# Patient Record
Sex: MALE | Race: WHITE | NOT HISPANIC OR LATINO | Employment: OTHER | URBAN - METROPOLITAN AREA
[De-identification: names, ages, dates, MRNs, and addresses within clinical notes are randomized per-mention and may not be internally consistent; named-entity substitution may affect disease eponyms.]

---

## 2023-09-19 ENCOUNTER — OFFICE VISIT (OUTPATIENT)
Dept: PRIMARY CARE | Facility: CLINIC | Age: 68
End: 2023-09-19

## 2023-09-19 VITALS
DIASTOLIC BLOOD PRESSURE: 86 MMHG | HEIGHT: 71 IN | SYSTOLIC BLOOD PRESSURE: 136 MMHG | HEART RATE: 97 BPM | BODY MASS INDEX: 36.75 KG/M2 | OXYGEN SATURATION: 95 % | WEIGHT: 262.5 LBS

## 2023-09-19 DIAGNOSIS — Z11.59 NEED FOR HEPATITIS C SCREENING TEST: ICD-10-CM

## 2023-09-19 DIAGNOSIS — I25.10 CORONARY ARTERY DISEASE INVOLVING NATIVE CORONARY ARTERY OF NATIVE HEART WITHOUT ANGINA PECTORIS: ICD-10-CM

## 2023-09-19 DIAGNOSIS — R05.1 ACUTE COUGH: ICD-10-CM

## 2023-09-19 DIAGNOSIS — E78.5 DYSLIPIDEMIA: ICD-10-CM

## 2023-09-19 DIAGNOSIS — R97.20 ELEVATED PSA: ICD-10-CM

## 2023-09-19 DIAGNOSIS — R73.01 ELEVATED FASTING BLOOD SUGAR: Primary | ICD-10-CM

## 2023-09-19 PROBLEM — D22.60 MELANOCYTIC NEVI OF UNSPECIFIED UPPER LIMB, INCLUDING SHOULDER: Status: ACTIVE | Noted: 2021-12-06

## 2023-09-19 PROBLEM — D22.4 MELANOCYTIC NEVI OF SCALP AND NECK: Status: ACTIVE | Noted: 2021-12-06

## 2023-09-19 PROBLEM — R53.83 OTHER FATIGUE: Status: RESOLVED | Noted: 2018-11-12 | Resolved: 2023-09-19

## 2023-09-19 PROBLEM — D18.01 HEMANGIOMA OF SKIN AND SUBCUTANEOUS TISSUE: Status: ACTIVE | Noted: 2021-12-06

## 2023-09-19 PROBLEM — M79.671 FOOT PAIN, BILATERAL: Status: RESOLVED | Noted: 2018-11-12 | Resolved: 2023-09-19

## 2023-09-19 PROBLEM — L23.7 ALLERGIC CONTACT DERMATITIS DUE TO PLANTS, EXCEPT FOOD: Status: ACTIVE | Noted: 2021-12-06

## 2023-09-19 PROBLEM — L82.1 OTHER SEBORRHEIC KERATOSIS: Status: ACTIVE | Noted: 2021-12-06

## 2023-09-19 PROBLEM — M79.672 FOOT PAIN, BILATERAL: Status: RESOLVED | Noted: 2018-11-12 | Resolved: 2023-09-19

## 2023-09-19 PROBLEM — D22.39 MELANOCYTIC NEVI OF OTHER PARTS OF FACE: Status: ACTIVE | Noted: 2021-12-06

## 2023-09-19 PROBLEM — L90.5 SCAR CONDITION AND FIBROSIS OF SKIN: Status: ACTIVE | Noted: 2021-12-06

## 2023-09-19 PROBLEM — C44.319 BASAL CELL CARCINOMA OF SKIN OF OTHER PARTS OF FACE: Status: ACTIVE | Noted: 2021-12-06

## 2023-09-19 PROBLEM — D48.5 NEOPLASM OF UNCERTAIN BEHAVIOR OF SKIN: Status: ACTIVE | Noted: 2021-12-06

## 2023-09-19 PROBLEM — L57.0 ACTINIC KERATOSIS: Status: ACTIVE | Noted: 2021-12-06

## 2023-09-19 PROBLEM — Z85.828 PERSONAL HISTORY OF OTHER MALIGNANT NEOPLASM OF SKIN: Status: ACTIVE | Noted: 2021-12-06

## 2023-09-19 PROBLEM — L57.9 SKIN CHANGES DUE TO CHRONIC EXPOSURE TO NONIONIZING RADIATION, UNSPECIFIED: Status: ACTIVE | Noted: 2021-12-06

## 2023-09-19 PROBLEM — D22.70 MELANOCYTIC NEVI OF UNSPECIFIED LOWER LIMB, INCLUDING HIP: Status: ACTIVE | Noted: 2021-12-06

## 2023-09-19 PROBLEM — L02.91 ABSCESS: Status: RESOLVED | Noted: 2018-11-12 | Resolved: 2023-09-19

## 2023-09-19 PROBLEM — D22.5 MELANOCYTIC NEVI OF TRUNK: Status: ACTIVE | Noted: 2021-12-06

## 2023-09-19 LAB
POC ALBUMIN /CREATININE RATIO MANUALLY ENTERED: ABNORMAL UG/MG CREAT
POC HEMOGLOBIN A1C: 8 % (ref 4.2–6.5)
POC URINE ALBUMIN: 150 MG/L
POC URINE CREATININE: 200 MG/DL

## 2023-09-19 PROCEDURE — 82044 UR ALBUMIN SEMIQUANTITATIVE: CPT | Performed by: FAMILY MEDICINE

## 2023-09-19 PROCEDURE — 1036F TOBACCO NON-USER: CPT | Performed by: FAMILY MEDICINE

## 2023-09-19 PROCEDURE — 1159F MED LIST DOCD IN RCRD: CPT | Performed by: FAMILY MEDICINE

## 2023-09-19 PROCEDURE — 99203 OFFICE O/P NEW LOW 30 MIN: CPT | Performed by: FAMILY MEDICINE

## 2023-09-19 PROCEDURE — 83036 HEMOGLOBIN GLYCOSYLATED A1C: CPT | Performed by: FAMILY MEDICINE

## 2023-09-19 RX ORDER — AZITHROMYCIN 250 MG/1
TABLET, FILM COATED ORAL
Qty: 6 TABLET | Refills: 0 | Status: SHIPPED | OUTPATIENT
Start: 2023-09-19 | End: 2023-09-24

## 2023-09-19 RX ORDER — CLINDAMYCIN PHOSPHATE 10 UG/ML
LOTION TOPICAL
COMMUNITY
Start: 2021-12-06 | End: 2023-10-17 | Stop reason: ALTCHOICE

## 2023-09-19 RX ORDER — TRIAMCINOLONE ACETONIDE 1 MG/G
CREAM TOPICAL
COMMUNITY
Start: 2021-08-09 | End: 2023-10-17 | Stop reason: ALTCHOICE

## 2023-09-19 RX ORDER — METFORMIN HYDROCHLORIDE 500 MG/1
1000 TABLET, EXTENDED RELEASE ORAL
Qty: 120 TABLET | Refills: 2 | Status: SHIPPED | OUTPATIENT
Start: 2023-09-19

## 2023-09-19 RX ORDER — ROSUVASTATIN CALCIUM 10 MG/1
10 TABLET, COATED ORAL NIGHTLY
Qty: 30 TABLET | Refills: 2 | Status: SHIPPED | OUTPATIENT
Start: 2023-09-19

## 2023-09-19 RX ORDER — ASPIRIN 81 MG/1
81 TABLET ORAL DAILY
COMMUNITY

## 2023-09-19 RX ORDER — METFORMIN HYDROCHLORIDE 500 MG/1
1000 TABLET, EXTENDED RELEASE ORAL
Qty: 120 TABLET | Refills: 2 | Status: SHIPPED | OUTPATIENT
Start: 2023-09-19 | End: 2023-09-19 | Stop reason: SDUPTHER

## 2023-09-19 ASSESSMENT — ENCOUNTER SYMPTOMS
WHEEZING: 1
CHEST TIGHTNESS: 1
ABDOMINAL PAIN: 0
WEAKNESS: 0
LOSS OF SENSATION IN FEET: 1
UNEXPECTED WEIGHT CHANGE: 0
BLOOD IN STOOL: 0
VOMITING: 0
DYSURIA: 0
DEPRESSION: 0
NAUSEA: 0
DIARRHEA: 0
SHORTNESS OF BREATH: 1
DIFFICULTY URINATING: 0
FEVER: 0
CONFUSION: 0
TROUBLE SWALLOWING: 0
OCCASIONAL FEELINGS OF UNSTEADINESS: 0
CHILLS: 0
DIZZINESS: 0
LIGHT-HEADEDNESS: 0
NUMBNESS: 0
COUGH: 1

## 2023-09-19 ASSESSMENT — PATIENT HEALTH QUESTIONNAIRE - PHQ9
2. FEELING DOWN, DEPRESSED OR HOPELESS: NOT AT ALL
1. LITTLE INTEREST OR PLEASURE IN DOING THINGS: NOT AT ALL
SUM OF ALL RESPONSES TO PHQ9 QUESTIONS 1 AND 2: 0

## 2023-09-19 NOTE — ASSESSMENT & PLAN NOTE
Likely bacterial (bronchitis) due to duration of symptoms. Z-renea. Avoid steroid, at least for now, due to unknown degree of hyperglycemia.

## 2023-09-19 NOTE — ASSESSMENT & PLAN NOTE
Continue low-dose aspirin. Anticipate restarting statin after fasting labs. Return after fasting labs.

## 2023-09-19 NOTE — PROGRESS NOTES
"Subjective   Patient ID: Lukasz Rodrigues is a 68 y.o. male who presents for Establish Care (Pt presents as new to you pt to establish care, chest congestion productive green cough, pt states out of DM medication.BL).  HPI    Previously a patient of Dr. Rivas, last seen few years ago.      Went to [facility] screening, reports glucose near 200 while fasting.    C/o cough productive of green sputum for a couple weeks. Denies fever, chills, sweats, chest/side/back pain, NVD. Tried NyQuil, helped a little.      Review of Systems   Constitutional:  Negative for chills, fever and unexpected weight change.   HENT:  Negative for ear pain and trouble swallowing.    Respiratory:  Positive for cough, chest tightness, shortness of breath and wheezing.    Cardiovascular:  Negative for chest pain.   Gastrointestinal:  Negative for abdominal pain, blood in stool, diarrhea, nausea and vomiting.   Genitourinary:  Negative for difficulty urinating and dysuria.   Skin:  Negative for rash.   Neurological:  Negative for dizziness, syncope, weakness, light-headedness and numbness.   Psychiatric/Behavioral:  Negative for behavioral problems and confusion.          Objective   /86   Pulse 97   Ht 1.803 m (5' 11\")   Wt 119 kg (262 lb 8 oz)   SpO2 95%   BMI 36.61 kg/m²     Physical Exam  Vitals and nursing note reviewed.   Constitutional:       General: He is not in acute distress.     Appearance: Normal appearance. He is not diaphoretic.   HENT:      Head: Normocephalic and atraumatic.      Right Ear: Tympanic membrane, ear canal and external ear normal.      Left Ear: Tympanic membrane, ear canal and external ear normal.      Nose: Nose normal.      Mouth/Throat:      Mouth: Mucous membranes are moist.      Pharynx: Oropharynx is clear. No posterior oropharyngeal erythema.   Eyes:      General: No scleral icterus.     Extraocular Movements: Extraocular movements intact.      Conjunctiva/sclera: Conjunctivae normal. "   Cardiovascular:      Rate and Rhythm: Normal rate and regular rhythm.      Heart sounds: Normal heart sounds.   Pulmonary:      Effort: Pulmonary effort is normal. No respiratory distress.      Breath sounds: Normal breath sounds. No wheezing, rhonchi or rales.   Skin:     General: Skin is warm and dry.      Coloration: Skin is not jaundiced.   Neurological:      General: No focal deficit present.      Mental Status: He is alert and oriented to person, place, and time. Mental status is at baseline.   Psychiatric:         Mood and Affect: Mood normal.         Thought Content: Thought content normal.         Assessment/Plan   Problem List Items Addressed This Visit       Dyslipidemia     Recheck.         Relevant Medications    rosuvastatin (Crestor) 10 mg tablet    Other Relevant Orders    CBC    Comprehensive Metabolic Panel    Lipid Panel    Elevated fasting blood sugar - Primary     Likely diabetes based on reported h/o fasting glucose around 200. Check A1c. Restart Metformin.         Relevant Medications    metFORMIN XR (Glucophage-XR) 500 mg 24 hr tablet    Other Relevant Orders    POCT glycosylated hemoglobin (Hb A1C) manually resulted    POCT microalbumin manually resulted    Follow Up In Primary Care - Established    Elevated PSA     Recheck. Previous results not available. Says he never saw urology. Check percent free and total.         Relevant Orders    PSA, total and free    Need for hepatitis C screening test    Relevant Orders    Hepatitis C Antibody    Coronary artery disease involving native coronary artery of native heart without angina pectoris     Continue low-dose aspirin. Anticipate restarting statin after fasting labs. Return after fasting labs.         Relevant Medications    rosuvastatin (Crestor) 10 mg tablet    Other Relevant Orders    Creatine Kinase    Acute cough     Likely bacterial (bronchitis) due to duration of symptoms. Z-renea. Avoid steroid, at least for now, due to unknown degree  of hyperglycemia.         Relevant Medications    azithromycin (Zithromax) 250 mg tablet    Other Relevant Orders    XR chest 2 views

## 2023-09-19 NOTE — PATIENT INSTRUCTIONS
Anticipate starting Crestor/rosuvastatin after your labs. Do the labs as soon as you can, to get baseline cholesterol and liver numbers, etc.    Recommend seeing an eye doctor at least annually for a diabetic eye exam. Be sure to visually inspect your feet every day, looking for cuts, scrapes, infections. Propping a mirror against the wall at an angle can be used to help you see the bottoms of your feet, if necessary.  For non-insulin dependents diabetes, check your fasting (12 hours) glucose at least once a week.  Check your glucose 2 to 4 hours after meals, to help understand how different foods affect your glucose. Seek immediate medical attention for glucose < 55 or > 400, altered consciousness, seizure, significant dehydration, or other significant concern.  Advise follow-up appointments usually every 3 months.     Please return for a Wellness visit a few day after doing the fasting labs, to also review results and options.    Please return or seek medical attention if symptoms persist, change, worsen, or return. For emergencies, call 9-1-1 or go to the nearest Emergency Room.    Please return for a follow-up appointment in 3 months, earlier if any question or concern. Please schedule additional problem-focused appointment(s) to address additional problem(s).       For assistance with scheduling referrals or consultations, please call [phone]. For laboratory, radiology, and other tests, please call [phone] ([phone] for pediatrics). Please review prescription inserts and published information for possible adverse effects of all medications. Return after testing or consultation to review results and recommendations, if symptoms persist, change, worsen, or return, or if you have any question or concern. If you do not get results within 7-10 days, or you have any question or concern, please send a message, call the office ([phone]), or return to the office for a follow-up appointment. For  non-emergencies, you may call the office. For emergencies, call 9-1-1 or go to the nearest Emergency Department. Please schedule additional appointment(s) to address concern(s) not addressed today.    In general, results are not released or discussed over the telephone. Results of tests done through [facility] are released via  27 bards (see below).  [email or web address]   27 bards support line: [phone]    Until we complete our transition to the new system, additional information can be found at [email or web address] Launch or on your Android or iOS (iPhone, iPad) device using the [employer] ranjana available free of charge in your device's ranjana store.

## 2023-09-28 ENCOUNTER — LAB (OUTPATIENT)
Dept: LAB | Facility: LAB | Age: 68
End: 2023-09-28

## 2023-09-28 DIAGNOSIS — Z11.59 NEED FOR HEPATITIS C SCREENING TEST: ICD-10-CM

## 2023-09-28 DIAGNOSIS — R97.20 ELEVATED PSA: ICD-10-CM

## 2023-09-28 DIAGNOSIS — E78.5 DYSLIPIDEMIA: ICD-10-CM

## 2023-09-28 DIAGNOSIS — I25.10 CORONARY ARTERY DISEASE INVOLVING NATIVE CORONARY ARTERY OF NATIVE HEART WITHOUT ANGINA PECTORIS: ICD-10-CM

## 2023-09-28 LAB
ALANINE AMINOTRANSFERASE (SGPT) (U/L) IN SER/PLAS: 25 U/L (ref 10–52)
ALBUMIN (G/DL) IN SER/PLAS: 4.1 G/DL (ref 3.4–5)
ALKALINE PHOSPHATASE (U/L) IN SER/PLAS: 81 U/L (ref 33–136)
ANION GAP IN SER/PLAS: 14 MMOL/L (ref 10–20)
ASPARTATE AMINOTRANSFERASE (SGOT) (U/L) IN SER/PLAS: 18 U/L (ref 9–39)
BILIRUBIN TOTAL (MG/DL) IN SER/PLAS: 0.4 MG/DL (ref 0–1.2)
CALCIUM (MG/DL) IN SER/PLAS: 9 MG/DL (ref 8.6–10.3)
CARBON DIOXIDE, TOTAL (MMOL/L) IN SER/PLAS: 23 MMOL/L (ref 21–32)
CHLORIDE (MMOL/L) IN SER/PLAS: 103 MMOL/L (ref 98–107)
CHOLESTEROL (MG/DL) IN SER/PLAS: 188 MG/DL (ref 0–199)
CHOLESTEROL IN HDL (MG/DL) IN SER/PLAS: 37.6 MG/DL
CHOLESTEROL/HDL RATIO: 5
CREATINE KINASE (U/L) IN SER/PLAS: 72 U/L (ref 0–325)
CREATININE (MG/DL) IN SER/PLAS: 1.23 MG/DL (ref 0.5–1.3)
ERYTHROCYTE DISTRIBUTION WIDTH (RATIO) BY AUTOMATED COUNT: 13.6 % (ref 11.5–14.5)
ERYTHROCYTE MEAN CORPUSCULAR HEMOGLOBIN CONCENTRATION (G/DL) BY AUTOMATED: 31.7 G/DL (ref 32–36)
ERYTHROCYTE MEAN CORPUSCULAR VOLUME (FL) BY AUTOMATED COUNT: 91 FL (ref 80–100)
ERYTHROCYTES (10*6/UL) IN BLOOD BY AUTOMATED COUNT: 5.07 X10E12/L (ref 4.5–5.9)
GFR MALE: 65 ML/MIN/1.73M2
GLUCOSE (MG/DL) IN SER/PLAS: 178 MG/DL (ref 74–99)
HEMATOCRIT (%) IN BLOOD BY AUTOMATED COUNT: 46 % (ref 41–52)
HEMOGLOBIN (G/DL) IN BLOOD: 14.6 G/DL (ref 13.5–17.5)
HEPATITIS C VIRUS AB PRESENCE IN SERUM: NONREACTIVE
LDL: 108 MG/DL (ref 0–99)
LEUKOCYTES (10*3/UL) IN BLOOD BY AUTOMATED COUNT: 10.4 X10E9/L (ref 4.4–11.3)
NON HDL CHOLESTEROL: 150 MG/DL
PLATELETS (10*3/UL) IN BLOOD AUTOMATED COUNT: 228 X10E9/L (ref 150–450)
POTASSIUM (MMOL/L) IN SER/PLAS: 4.4 MMOL/L (ref 3.5–5.3)
PROTEIN TOTAL: 6.9 G/DL (ref 6.4–8.2)
SODIUM (MMOL/L) IN SER/PLAS: 136 MMOL/L (ref 136–145)
TRIGLYCERIDE (MG/DL) IN SER/PLAS: 214 MG/DL (ref 0–149)
UREA NITROGEN (MG/DL) IN SER/PLAS: 24 MG/DL (ref 6–23)
VLDL: 43 MG/DL (ref 0–40)

## 2023-09-28 PROCEDURE — 80053 COMPREHEN METABOLIC PANEL: CPT

## 2023-09-28 PROCEDURE — 86803 HEPATITIS C AB TEST: CPT

## 2023-09-28 PROCEDURE — 84154 ASSAY OF PSA FREE: CPT

## 2023-09-28 PROCEDURE — 80061 LIPID PANEL: CPT

## 2023-09-28 PROCEDURE — 82550 ASSAY OF CK (CPK): CPT

## 2023-09-28 PROCEDURE — 84153 ASSAY OF PSA TOTAL: CPT

## 2023-09-28 PROCEDURE — 85027 COMPLETE CBC AUTOMATED: CPT

## 2023-09-28 PROCEDURE — 36415 COLL VENOUS BLD VENIPUNCTURE: CPT

## 2023-10-02 LAB
PROSTATE SPECIFIC AG (NG/ML) IN SER/PLAS: 4 NG/ML (ref 0–4)
PROSTATE SPECIFIC AG FREE (NG/ML) IN SER/PLAS: 1 NG/ML
PROSTATE SPECIFIC AG FREE/PROSTATE SPECIFIC AG TOTAL IN SER/PLAS: 25 %

## 2023-10-03 ENCOUNTER — TELEPHONE (OUTPATIENT)
Dept: PRIMARY CARE | Facility: CLINIC | Age: 68
End: 2023-10-03

## 2023-10-03 NOTE — TELEPHONE ENCOUNTER
Result Communication    Resulted Orders   CBC   Result Value Ref Range    WBC 10.4 4.4 - 11.3 x10E9/L    RBC 5.07 4.50 - 5.90 x10E12/L    Hemoglobin 14.6 13.5 - 17.5 g/dL    Hematocrit 46.0 41.0 - 52.0 %    MCV 91 80 - 100 fL    MCHC 31.7 (L) 32.0 - 36.0 g/dL    Platelets 228 150 - 450 x10E9/L    RDW 13.6 11.5 - 14.5 %   Comprehensive Metabolic Panel   Result Value Ref Range    Glucose 178 (H) 74 - 99 mg/dL    Sodium 136 136 - 145 mmol/L    Potassium 4.4 3.5 - 5.3 mmol/L    Chloride 103 98 - 107 mmol/L    Bicarbonate 23 21 - 32 mmol/L    Anion Gap 14 10 - 20 mmol/L    Urea Nitrogen 24 (H) 6 - 23 mg/dL    Creatinine 1.23 0.50 - 1.30 mg/dL    GFR MALE 65 >90 mL/min/1.73m2      Comment:       CALCULATIONS OF ESTIMATED GFR ARE PERFORMED   USING THE 2021 CKD-EPI STUDY REFIT EQUATION   WITHOUT THE RACE VARIABLE FOR THE IDMS-TRACEABLE   CREATININE METHODS.    https://jasn.asnjournals.org/content/early/2021/09/22/ASN.111957    Calcium 9.0 8.6 - 10.3 mg/dL    Albumin 4.1 3.4 - 5.0 g/dL    Alkaline Phosphatase 81 33 - 136 U/L    Total Protein 6.9 6.4 - 8.2 g/dL    AST 18 9 - 39 U/L    Total Bilirubin 0.4 0.0 - 1.2 mg/dL    ALT (SGPT) 25 10 - 52 U/L      Comment:       Patients treated with Sulfasalazine may generate    falsely decreased results for ALT.   Lipid Panel   Result Value Ref Range    Cholesterol 188 0 - 199 mg/dL      Comment:      .      AGE      DESIRABLE   BORDERLINE HIGH   HIGH     0-19 Y     0 - 169       170 - 199     >/= 200    20-24 Y     0 - 189       190 - 224     >/= 225         >24 Y     0 - 199       200 - 239     >/= 240   **All ranges are based on fasting samples. Specific   therapeutic targets will vary based on patient-specific   cardiac risk.  .   Pediatric guidelines reference:Pediatrics 2011, 128(S5).   Adult guidelines reference: NCEP ATPIII Guidelines,     CLAY 2001, 258:2486-97  .   Venipuncture immediately after or during the    administration of Metamizole may lead to falsely   low  results. Testing should be performed immediately   prior to Metamizole dosing.    HDL 37.6 (A) mg/dL      Comment:      .      AGE      VERY LOW   LOW     NORMAL    HIGH       0-19 Y       < 35   < 40     40-45     ----    20-24 Y       ----   < 40       >45     ----      >24 Y       ----   < 40     40-60      >60  .    Cholesterol/HDL Ratio 5.0       Comment:      REF VALUES  DESIRABLE  < 3.4  HIGH RISK  > 5.0     (H) 0 - 99 mg/dL      Comment:      .                           NEAR      BORD      AGE      DESIRABLE  OPTIMAL    HIGH     HIGH     VERY HIGH     0-19 Y     0 - 109     ---    110-129   >/= 130     ----    20-24 Y     0 - 119     ---    120-159   >/= 160     ----      >24 Y     0 -  99   100-129  130-159   160-189     >/=190  .    VLDL 43 (H) 0 - 40 mg/dL    Triglycerides 214 (H) 0 - 149 mg/dL      Comment:      .      AGE      DESIRABLE   BORDERLINE HIGH   HIGH     VERY HIGH   0 D-90 D    19 - 174         ----         ----        ----  91 D- 9 Y     0 -  74        75 -  99     >/= 100      ----    10-19 Y     0 -  89        90 - 129     >/= 130      ----    20-24 Y     0 - 114       115 - 149     >/= 150      ----         >24 Y     0 - 149       150 - 199    200- 499    >/= 500  .   Venipuncture immediately after or during the    administration of Metamizole may lead to falsely   low results. Testing should be performed immediately   prior to Metamizole dosing.    Non HDL Cholesterol 150 mg/dL      Comment:          AGE      DESIRABLE   BORDERLINE HIGH   HIGH     VERY HIGH     0-19 Y     0 - 119       120 - 144     >/= 145    >/= 160    20-24 Y     0 - 149       150 - 189     >/= 190      ----         >24 Y    30 MG/DL ABOVE LDL CHOLESTEROL GOAL  .   PSA, total and free   Result Value Ref Range    PSA 4.0 0.0 - 4.0 ng/mL      Comment:      INTERPRETIVE INFORMATION: Prostate Specific Antigen  The Roche PSA electrochemiluminescent immunoassay is used. Results   obtained with different test methods  or kits cannot be used   interchangeably. The Roche PSA method is approved for use as an   aid in the detection of prostate cancer when used in conjunction   with a digital rectal exam in individuals with a prostate age 50   years and older. The Roche PSA is also indicated for the serial   measurement of PSA to aid in the prognosis and management of   prostate cancer patients. Elevated PSA concentrations can only   suggest the presence of prostate cancer until biopsy is performed.   PSA concentrations can also be elevated in benign prostatic   hyperplasia or inflammatory conditions of the prostate. PSA is   generally not elevated in healthy individuals or individuals with   nonprostatic carcinoma.    PSA, Free 1.0 ng/mL    PSA, Free Pct 25 %      Comment:      INTERPRETIVE INFORMATION: Prostate Specific Antigen, Free                            Percentage  [facility] uses the Roche Free PSA electrochemiluminescent immunoassay   method in conjunction with the Roche PSA electrochemiluminescent   immunoassay method to determine the free PSA percentage. Values   obtained with different assay methods should not be used   interchangeably. The free PSA percentage is an aid in   distinguishing prostate cancer from benign prostatic conditions in   individuals with a prostate age 50 years and older with a total   PSA between 3 and 10 ng/mL and negative digital rectal examination   findings. Prostatic biopsy is required for the diagnosis of   cancer.   In patients with total PSA concentrations of 4-10 ng/mL, the   probability of finding prostate cancer on needle biopsy by age in   years is:  %fPSA               50-59    60-69    70 or older  0  - 10%            49%      58%      65%  11 - 18%            27%      34%      41%  19 - 25%            18%      24%       30%  Greater than 25%     9%      12%      16%  Other factors may help determine the actual risk of prostate   cancer in individual patients.  Performed By: [facility]  "[address] [address]  : Venkatesh Curry MD, PhD  CLIA Number: [id number]   Hepatitis C Antibody   Result Value Ref Range    Hepatitis C Ab NONREACTIVE NONREACTIVE      Comment:       Results from patients taking biotin supplements or receiving   high-dose biotin therapy should be interpreted with caution   due to possible interference with this test. Providers may    contact their local laboratory for further information.   Creatine Kinase   Result Value Ref Range    Total CK 72 0 - 325 U/L       1:42 PM    DO JUSTIN Romero Do [facility] Clinical Support Staff  Please let patient know that his PSA (prostate-specific antigen) is 4.0, and his percent free PSA is 25%, which for his age means that there is a 24% chance of finding prostate cancer on needle biopsy. I don't see any previous PSA values, which might change the concern for prostate cancer. He should follow-up up with a urologist, particularly if his previous PSA was significantly lower than 4.0.    His A1c is 8%, indicating borderline control of diabetes.    His GFR (glomerular filtration rate, an estimate of kidney function) is mildly decreased. Recommend maintaining a healthy blood pressure, controlling or avoiding diabetes, staying adequately hydrated, and minimizing or avoiding drugs potentially toxic to the kidneys (e.g., ibuprofen, naproxen, or other NSAIDs; Tylenol/acetaminophen is safe for the kidneys).    His LDL (a \"bad\" cholesterol) is mildly elevated at 108, his VLDL (a \"bad\" cholesterol) and triglycerides are also elevated, and his HDL (\"good\" cholesterol) is low. Recommend a low-sugar, low-fat, low-cholesterol, high-fiber, heart-healthy diet and lifestyle, and regular cardio exercise and weight loss as appropriate.    Urology  Dr. Kristopher Edmond, Dr. Vicente Horner ([place]) [phone]  Shelly Merida CNP ([place]) [phone]  Kinza Chahal Schneider, Davili " ([place]) [phone]  Dr. Rell Stacy [facility]) [phone]  Dr. Sergio Eagle, Dr. Karissa Jorge, Dr. Chau Michael, Dr. Jennifer Simms, et al. ([place]) [phone]  Dr. Sumeet Valenzuela et al. ([place]) [phone]    [facility]. [place]

## 2023-10-17 ENCOUNTER — ANCILLARY PROCEDURE (OUTPATIENT)
Dept: RADIOLOGY | Facility: CLINIC | Age: 68
End: 2023-10-17

## 2023-10-17 ENCOUNTER — OFFICE VISIT (OUTPATIENT)
Dept: PRIMARY CARE | Facility: CLINIC | Age: 68
End: 2023-10-17

## 2023-10-17 VITALS
DIASTOLIC BLOOD PRESSURE: 83 MMHG | SYSTOLIC BLOOD PRESSURE: 147 MMHG | HEIGHT: 71 IN | BODY MASS INDEX: 37.17 KG/M2 | OXYGEN SATURATION: 94 % | HEART RATE: 82 BPM | WEIGHT: 265.5 LBS

## 2023-10-17 DIAGNOSIS — E78.5 HYPERLIPIDEMIA, UNSPECIFIED HYPERLIPIDEMIA TYPE: ICD-10-CM

## 2023-10-17 DIAGNOSIS — R05.1 ACUTE COUGH: Primary | ICD-10-CM

## 2023-10-17 DIAGNOSIS — R05.1 ACUTE COUGH: ICD-10-CM

## 2023-10-17 DIAGNOSIS — E11.42 TYPE 2 DIABETES MELLITUS WITH DIABETIC POLYNEUROPATHY, WITHOUT LONG-TERM CURRENT USE OF INSULIN (MULTI): ICD-10-CM

## 2023-10-17 DIAGNOSIS — R97.20 ELEVATED PSA: ICD-10-CM

## 2023-10-17 PROCEDURE — 1159F MED LIST DOCD IN RCRD: CPT | Performed by: FAMILY MEDICINE

## 2023-10-17 PROCEDURE — 71046 X-RAY EXAM CHEST 2 VIEWS: CPT

## 2023-10-17 PROCEDURE — 3077F SYST BP >= 140 MM HG: CPT | Performed by: FAMILY MEDICINE

## 2023-10-17 PROCEDURE — 71046 X-RAY EXAM CHEST 2 VIEWS: CPT | Performed by: RADIOLOGY

## 2023-10-17 PROCEDURE — 99214 OFFICE O/P EST MOD 30 MIN: CPT | Performed by: FAMILY MEDICINE

## 2023-10-17 PROCEDURE — 1036F TOBACCO NON-USER: CPT | Performed by: FAMILY MEDICINE

## 2023-10-17 PROCEDURE — 3079F DIAST BP 80-89 MM HG: CPT | Performed by: FAMILY MEDICINE

## 2023-10-17 RX ORDER — DOXYCYCLINE 100 MG/1
100 CAPSULE ORAL 2 TIMES DAILY
Qty: 20 CAPSULE | Refills: 0 | Status: SHIPPED | OUTPATIENT
Start: 2023-10-17 | End: 2023-10-27

## 2023-10-17 ASSESSMENT — ENCOUNTER SYMPTOMS
NUMBNESS: 0
CONFUSION: 0
DEPRESSION: 0
DIARRHEA: 0
UNEXPECTED WEIGHT CHANGE: 0
WHEEZING: 0
FEVER: 0
WEAKNESS: 0
DYSURIA: 0
VOMITING: 0
TROUBLE SWALLOWING: 0
LIGHT-HEADEDNESS: 0
BLOOD IN STOOL: 0
SHORTNESS OF BREATH: 0
DIFFICULTY URINATING: 0
CHILLS: 0
LOSS OF SENSATION IN FEET: 1
COUGH: 1
NAUSEA: 0
OCCASIONAL FEELINGS OF UNSTEADINESS: 0
DIZZINESS: 0
ABDOMINAL PAIN: 0

## 2023-10-17 NOTE — PROGRESS NOTES
"Subjective   Patient ID: Lukasz oRdrigues is a 68 y.o. male who presents for Follow-up (Pt presents in lab F/U, pt c/o till bringing up some green phlegm, requesting another zpack, no rx's needed.BL).  HPI    C/o persistent cough productive of green phlegm. Denies fever. The Z-renea did seem to help, but not resolve his symptoms.    Is going to do follow-up appointment with dermatology in [place] in January 2024.      Review of Systems   Constitutional:  Negative for chills, fever and unexpected weight change.   HENT:  Negative for ear pain and trouble swallowing.    Respiratory:  Positive for cough. Negative for shortness of breath and wheezing.    Cardiovascular:  Negative for chest pain.   Gastrointestinal:  Negative for abdominal pain, blood in stool, diarrhea, nausea and vomiting.   Genitourinary:  Negative for difficulty urinating and dysuria.   Skin:  Negative for rash.   Neurological:  Negative for dizziness, syncope, weakness, light-headedness and numbness.   Psychiatric/Behavioral:  Negative for behavioral problems and confusion.          Objective   /83   Pulse 82   Ht 1.81 m (5' 11.25\")   Wt 120 kg (265 lb 8 oz)   SpO2 94%   BMI 36.77 kg/m²     Physical Exam  Vitals and nursing note reviewed.   Constitutional:       General: He is not in acute distress.     Appearance: He is not diaphoretic.   HENT:      Head: Normocephalic and atraumatic.   Eyes:      General: No scleral icterus.     Conjunctiva/sclera: Conjunctivae normal.   Cardiovascular:      Rate and Rhythm: Normal rate and regular rhythm.      Heart sounds: Normal heart sounds.   Pulmonary:      Effort: Pulmonary effort is normal.      Breath sounds: Normal breath sounds. No wheezing, rhonchi or rales.   Musculoskeletal:      Right lower leg: No edema.      Left lower leg: No edema.   Skin:     General: Skin is warm and dry.   Neurological:      General: No focal deficit present.      Mental Status: He is alert. Mental status is at " baseline.   Psychiatric:         Mood and Affect: Mood normal.         Thought Content: Thought content normal.         Assessment/Plan   Problem List Items Addressed This Visit       Hyperlipidemia     Start the Rosuvastatin. Target LDL < 70. Recheck in 3 months.         Relevant Orders    Lipid Panel    Comprehensive Metabolic Panel    Creatine Kinase    Type 2 diabetes mellitus with diabetic polyneuropathy, without long-term current use of insulin (CMS/[facility])     Continue the Metformin, recheck and return in 3 months.         Relevant Orders    Hemoglobin A1C    Hemoglobin A1C    Referral to Ophthalmology    Elevated PSA     PSA within the reference range. Discussed % free PSA, which indiates 24% risk of finding prostate cancer on a biopsy. Discussed urology referral v. PSA recheck in 6 months. He declines urology referral, so recheck PSA in 6 months.         Relevant Orders    PSA, total and free    Acute cough - Primary     Doxycycline, check CXR.         Relevant Medications    doxycycline (Vibramycin) 100 mg capsule

## 2023-10-17 NOTE — PATIENT INSTRUCTIONS
"Recommend maintaining a healthy blood pressure, controlling or avoiding diabetes, staying adequately hydrated, and avoiding drugs potentially toxic to the kidneys (e.g., ibuprofen, naproxen, or other NSAIDs; Tylenol/acetaminophen is safe for the kidneys).    Monitor your resting blood pressure (BP) and heart rate (HR) at home. Should be fairly consistently better than 140/90, preferably better than 130/80.  For a list of validated BP cuffs: https://www.validatebp.org/     Recommend seeing an eye doctor at least annually for a diabetic eye exam. Be sure to visually inspect your feet every day, looking for cuts, scrapes, infections. Propping a mirror against the wall at an angle can be used to help you see the bottoms of your feet, if necessary.  For non-insulin dependents diabetes, check your fasting (12 hours) glucose at least once a week.  Check your glucose 2 to 4 hours after meals, to help understand how different foods affect your glucose. Seek immediate medical attention for glucose < 55 or > 400, altered consciousness, seizure, significant dehydration, or other significant concern.  Advise follow-up appointments usually every 3 months.     Ophthalmology  Dr. Caleb Marie, et al. [phone]  Dr. Tyron Vazquez, Dr. Teresa Rizo, et al. [phone]  Dr. Philipp Vo [phone]  Dr. Deejay Hunt, et al. [phone]    Recommend the Prevnar-20 \"pneumonia\" vaccine when you are well.    Please return for a follow-up appointment in 3 months, after fasting labs, earlier if any question or concern.    Please return for a follow-up appointment in 6 months, after PSA and A1c, earlier if any question or concern.       For assistance with scheduling referrals or consultations, please call [phone]. For laboratory, radiology, and other tests, please call [phone] ([phone] for pediatrics). Please review prescription inserts and published information for possible adverse effects of all " medications. Return after testing or consultation to review results and recommendations, if symptoms persist, change, worsen, or return, or if you have any question or concern. If you do not get results within 7-10 days, or you have any question or concern, please send a message, call the office ([phone]), or return to the office for a follow-up appointment. For non-emergencies, you may call the office. For emergencies, call 9-1-1 or go to the nearest Emergency Department. Please schedule additional appointment(s) to address concern(s) not addressed today.    In general, results are not released or discussed over the telephone. Results of tests done through [facility] are released via  Sprout (see below).  [email or web address]   Sprout support line: [phone]    Until we complete our transition to the new system, additional information can be found at [email or web address] or on your Android or iOS (iPhone, iPad) device using the [employer] ranjana available free of charge in your device's ranjana store.

## 2023-10-17 NOTE — ASSESSMENT & PLAN NOTE
PSA within the reference range. Discussed % free PSA, which indiates 24% risk of finding prostate cancer on a biopsy. Discussed urology referral v. PSA recheck in 6 months. He declines urology referral, so recheck PSA in 6 months.

## 2023-10-27 ENCOUNTER — TELEPHONE (OUTPATIENT)
Dept: PRIMARY CARE | Facility: CLINIC | Age: 68
End: 2023-10-27

## 2023-10-27 NOTE — TELEPHONE ENCOUNTER
Result Communication    Resulted Orders   XR chest 2 views    Narrative    Interpreted By:  Wayne Pacheco,   STUDY:  XR CHEST 2 VIEWS;  10/17/2023 9:05 am      INDICATION:  Signs/Symptoms:cough.      COMPARISON:  No prior examination available for comparison. If a prior examination  becomes available, this examination will be compared to the prior  study. Addendum report will be issued.      ACCESSION NUMBER(S):  [id number]      ORDERING CLINICIAN:  MIRELLA ROB      FINDINGS:                  CARDIOMEDIASTINAL SILHOUETTE:  Cardiomediastinal silhouette is normal in size and configuration.      LUNGS:  There is minimal bibasilar linear atelectasis.      ABDOMEN:  No remarkable upper abdominal findings.      BONES:  No acute osseous changes.        Impression    1.  Minimal bibasilar linear atelectasis.              MACRO:  None      Signed by: Wayne Pacheco 10/18/2023 2:06 PM  Dictation workstation:   [id number]       12:10 PM      Results were successfully communicated with the patient and they acknowledged their understanding.

## 2023-10-27 NOTE — TELEPHONE ENCOUNTER
----- Message from Last Ramirez DO sent at 10/23/2023  3:24 PM EDT -----  Please let patient know that his chest X-ray report describes no evidence of pneumonia, but does describe findings likely related to not expanding the lung as well as usual. Recommend deep breathing exercises, or using an incentive spirometer.

## 2024-01-08 ENCOUNTER — APPOINTMENT (OUTPATIENT)
Dept: DERMATOLOGY | Facility: CLINIC | Age: 69
End: 2024-01-08

## 2024-06-24 ENCOUNTER — APPOINTMENT (OUTPATIENT)
Dept: PRIMARY CARE | Facility: CLINIC | Age: 69
End: 2024-06-24

## 2024-06-24 VITALS
DIASTOLIC BLOOD PRESSURE: 79 MMHG | HEIGHT: 72 IN | WEIGHT: 258.5 LBS | HEART RATE: 80 BPM | OXYGEN SATURATION: 96 % | SYSTOLIC BLOOD PRESSURE: 144 MMHG | BODY MASS INDEX: 35.01 KG/M2

## 2024-06-24 DIAGNOSIS — R80.9 MICROALBUMINURIA: ICD-10-CM

## 2024-06-24 DIAGNOSIS — E11.42 TYPE 2 DIABETES MELLITUS WITH DIABETIC POLYNEUROPATHY, WITHOUT LONG-TERM CURRENT USE OF INSULIN (MULTI): ICD-10-CM

## 2024-06-24 DIAGNOSIS — Z12.11 SCREENING FOR COLON CANCER: ICD-10-CM

## 2024-06-24 DIAGNOSIS — Z00.00 WELL ADULT HEALTH CHECK: Primary | ICD-10-CM

## 2024-06-24 DIAGNOSIS — I10 PRIMARY HYPERTENSION: ICD-10-CM

## 2024-06-24 DIAGNOSIS — E78.2 MIXED HYPERLIPIDEMIA: ICD-10-CM

## 2024-06-24 DIAGNOSIS — I20.89 STABLE ANGINA (CMS-HCC): ICD-10-CM

## 2024-06-24 DIAGNOSIS — R05.3 CHRONIC COUGH: ICD-10-CM

## 2024-06-24 DIAGNOSIS — I25.10 CORONARY ARTERY DISEASE INVOLVING NATIVE CORONARY ARTERY OF NATIVE HEART WITHOUT ANGINA PECTORIS: ICD-10-CM

## 2024-06-24 LAB — POC HEMOGLOBIN A1C: 7.9 % (ref 4.2–6.5)

## 2024-06-24 PROCEDURE — 99214 OFFICE O/P EST MOD 30 MIN: CPT | Performed by: FAMILY MEDICINE

## 2024-06-24 PROCEDURE — 1159F MED LIST DOCD IN RCRD: CPT | Performed by: FAMILY MEDICINE

## 2024-06-24 PROCEDURE — 83036 HEMOGLOBIN GLYCOSYLATED A1C: CPT | Performed by: FAMILY MEDICINE

## 2024-06-24 PROCEDURE — 99397 PER PM REEVAL EST PAT 65+ YR: CPT | Performed by: FAMILY MEDICINE

## 2024-06-24 PROCEDURE — 3078F DIAST BP <80 MM HG: CPT | Performed by: FAMILY MEDICINE

## 2024-06-24 PROCEDURE — 90471 IMMUNIZATION ADMIN: CPT | Performed by: FAMILY MEDICINE

## 2024-06-24 PROCEDURE — 90677 PCV20 VACCINE IM: CPT | Performed by: FAMILY MEDICINE

## 2024-06-24 PROCEDURE — 4010F ACE/ARB THERAPY RXD/TAKEN: CPT | Performed by: FAMILY MEDICINE

## 2024-06-24 PROCEDURE — 3077F SYST BP >= 140 MM HG: CPT | Performed by: FAMILY MEDICINE

## 2024-06-24 PROCEDURE — 1160F RVW MEDS BY RX/DR IN RCRD: CPT | Performed by: FAMILY MEDICINE

## 2024-06-24 RX ORDER — METFORMIN HYDROCHLORIDE 500 MG/1
1000 TABLET, EXTENDED RELEASE ORAL
Qty: 120 TABLET | Refills: 2 | Status: SHIPPED | OUTPATIENT
Start: 2024-06-24

## 2024-06-24 RX ORDER — ALBUTEROL SULFATE 90 UG/1
2 AEROSOL, METERED RESPIRATORY (INHALATION) EVERY 4 HOURS PRN
Qty: 18 G | Refills: 0 | Status: SHIPPED | OUTPATIENT
Start: 2024-06-24

## 2024-06-24 RX ORDER — DOXYCYCLINE 100 MG/1
100 CAPSULE ORAL 2 TIMES DAILY
Qty: 20 CAPSULE | Refills: 0 | Status: SHIPPED | OUTPATIENT
Start: 2024-06-24 | End: 2024-07-04

## 2024-06-24 RX ORDER — LISINOPRIL 10 MG/1
10 TABLET ORAL DAILY
Qty: 100 TABLET | Refills: 0 | Status: SHIPPED | OUTPATIENT
Start: 2024-06-24

## 2024-06-24 RX ORDER — NITROGLYCERIN 0.4 MG/1
0.4 TABLET SUBLINGUAL EVERY 5 MIN PRN
Qty: 30 TABLET | Refills: 0 | Status: SHIPPED | OUTPATIENT
Start: 2024-06-24

## 2024-06-24 RX ORDER — ROSUVASTATIN CALCIUM 10 MG/1
10 TABLET, COATED ORAL NIGHTLY
Qty: 30 TABLET | Refills: 2 | Status: SHIPPED | OUTPATIENT
Start: 2024-06-24

## 2024-06-24 RX ORDER — DAPAGLIFLOZIN 5 MG/1
5 TABLET, FILM COATED ORAL DAILY
Qty: 100 TABLET | Refills: 0 | Status: SHIPPED | OUTPATIENT
Start: 2024-06-24

## 2024-06-24 ASSESSMENT — ENCOUNTER SYMPTOMS
APNEA: 0
CHOKING: 0
CHILLS: 0
FEVER: 0
HEADACHES: 0
DIAPHORESIS: 0
DEPRESSION: 0
OCCASIONAL FEELINGS OF UNSTEADINESS: 0
DIZZINESS: 0
COUGH: 1
WHEEZING: 1
SHORTNESS OF BREATH: 0
PALPITATIONS: 0
LOSS OF SENSATION IN FEET: 0
LIGHT-HEADEDNESS: 0

## 2024-06-24 ASSESSMENT — PATIENT HEALTH QUESTIONNAIRE - PHQ9
SUM OF ALL RESPONSES TO PHQ9 QUESTIONS 1 AND 2: 0
1. LITTLE INTEREST OR PLEASURE IN DOING THINGS: NOT AT ALL
2. FEELING DOWN, DEPRESSED OR HOPELESS: NOT AT ALL

## 2024-06-24 NOTE — PROGRESS NOTES
"Subjective   Patient ID: Lukasz Rodrigues is a 69 y.o. male who presents for Diabetes (Pt presents for DM check up, coughing productive, green sometimes x1 year on and off, rx needed. BL).  HPI Historian(s): Self and Daughter    Generally feeling well.     c/o chronic cough on and off. Started over a year ago. Denies FMHx COPD. Had very good response to Doxycycline in the past.    c/o chest pressure when doing something strenuous, but never at rest.    Is not checking glucose regularly.  Is not getting significant lows.  Is not having problems with medication.  Is seeing eye doctor at least annually.  Denies tingling, wound(s), dry skin, thick nails, and mycotic nails  Complains of numbness  Is inspecting feet daily.     Lab Results   Component Value Date    HGBA1C 7.9 (A) 06/24/2024    HGBA1C 8.0 (A) 09/19/2023     Lab Results   Component Value Date    LDLF 108 (H) 09/28/2023     Lab Results   Component Value Date    PSA 4.0 09/28/2023    PSAFREE 1.0 09/28/2023     Lab Results   Component Value Date    CREATININE 1.23 09/28/2023     Lab Results   Component Value Date    MICROALBCREA 30 - 300 (A) 09/19/2023      Lab Results   Component Value Date    TRIG 214 (H) 09/28/2023          Review of Systems   Constitutional:  Negative for chills, diaphoresis and fever.   Eyes:  Negative for visual disturbance.   Respiratory:  Positive for cough and wheezing. Negative for apnea (no PND), choking and shortness of breath.    Cardiovascular:  Positive for chest pain. Negative for palpitations and leg swelling.   Neurological:  Negative for dizziness, syncope, light-headedness and headaches.   All other systems reviewed and are negative.      Patient Care Team:  Last Ramirez DO as PCP - General (Family Medicine)  Christophe Saldaña MD as Consulting Physician (Dermatology)    Objective   /79   Pulse 80   Ht 1.822 m (5' 11.75\")   Wt 117 kg (258 lb 8 oz)   SpO2 96%   BMI 35.30 kg/m²     Lab Results   Component Value Date "    PSA 4.0 09/28/2023    PSAFREE 1.0 09/28/2023       Physical Exam  Vitals and nursing note reviewed.   Constitutional:       General: He is not in acute distress.     Appearance: Normal appearance. He is well-developed.      Comments: No assistive device presently being used.   HENT:      Head: Normocephalic and atraumatic.      Nose: Nose normal.   Eyes:      General: No scleral icterus.     Extraocular Movements: Extraocular movements intact.      Conjunctiva/sclera: Conjunctivae normal.   Neck:      Thyroid: No thyromegaly.      Vascular: No carotid bruit or JVD.   Cardiovascular:      Rate and Rhythm: Normal rate and regular rhythm.      Heart sounds: Normal heart sounds.   Pulmonary:      Effort: Pulmonary effort is normal. No respiratory distress.      Breath sounds: Normal breath sounds.   Abdominal:      General: Bowel sounds are normal. There is no distension.      Palpations: Abdomen is soft. There is no mass.      Tenderness: There is no abdominal tenderness. There is no guarding or rebound.   Musculoskeletal:      Cervical back: Normal range of motion. No tenderness.      Right lower leg: No edema.      Left lower leg: No edema.   Feet:      Right foot:      Protective Sensation: 10 sites tested.  10 sites sensed.      Skin integrity: Skin integrity normal.      Left foot:      Protective Sensation: 10 sites tested.  10 sites sensed.      Skin integrity: Skin integrity normal.   Skin:     General: Skin is warm and dry.      Coloration: Skin is not jaundiced.   Neurological:      General: No focal deficit present.      Mental Status: He is alert and oriented to person, place, and time. Mental status is at baseline.   Psychiatric:         Mood and Affect: Mood normal.         Behavior: Behavior normal.         Thought Content: Thought content normal.         Assessment/Plan   Problem List Items Addressed This Visit       Hyperlipidemia    Relevant Medications    rosuvastatin (Crestor) 10 mg tablet     Other Relevant Orders    Comprehensive Metabolic Panel    CBC    Lipid Panel    TSH with reflex to Free T4 if abnormal    Creatine Kinase    Comprehensive Metabolic Panel    Creatine Kinase    Lipid Panel    Follow Up In Primary Care    Type 2 diabetes mellitus with diabetic polyneuropathy, without long-term current use of insulin (Multi)    Current Assessment & Plan     Add Farxiga.         Relevant Medications    metFORMIN XR (Glucophage-XR) 500 mg 24 hr tablet    dapagliflozin propanediol (Farxiga) 5 mg    Other Relevant Orders    POCT glycosylated hemoglobin (Hb A1C) manually resulted (Completed)    Hemoglobin A1C    Follow Up In Primary Care    Coronary artery disease involving native coronary artery of native heart without angina pectoris    Current Assessment & Plan     Continue low-dose aspirin. (Re-)start statin.         Relevant Medications    rosuvastatin (Crestor) 10 mg tablet    nitroglycerin (Nitrostat) 0.4 mg SL tablet    Chronic cough    Current Assessment & Plan     Suspect underlying asthma or COPD. Try Doxycyline, Albuterol, check PFT.         Relevant Medications    doxycycline (Vibramycin) 100 mg capsule    albuterol (Proventil HFA) 90 mcg/actuation inhaler    Other Relevant Orders    Complete Pulmonary Function Test Pre/Post Bronchodialator (Spirometry Pre/Post/DLCO/Lung Volumes)    Well adult health check - Primary    Current Assessment & Plan     69yM doing fairly well.         Relevant Orders    Magnesium    Primary hypertension    Current Assessment & Plan     Start Lisinopril.         Relevant Medications    lisinopril 10 mg tablet    Other Relevant Orders    Basic Metabolic Panel    Microalbuminuria    Current Assessment & Plan     Start Lisinopril.         Relevant Medications    dapagliflozin propanediol (Farxiga) 5 mg    Other Relevant Orders    Basic Metabolic Panel    Stable angina (CMS-HCC)    Current Assessment & Plan     NTG on hand, follow-up with cardiology.         Relevant  Medications    nitroglycerin (Nitrostat) 0.4 mg SL tablet     Other Visit Diagnoses       Screening for colon cancer        Relevant Orders    Colonoscopy Screening; Average Risk Patient                    Time Spent  Time spent directly with patient, family or caregiver: 45 minutes

## 2024-06-24 NOTE — PATIENT INSTRUCTIONS
Recommend a follow-up appointment a couple days after tests, to review results and options, earlier if any question or concern.     Recommend follow-up as soon as possible with a cardiologist. Avoid activities that cause chest pain/tightness/pressure.    Cardiology  Dr. Cande Garcia, Dr. Vonnie Joseph, Dr. Po Rodriguez, Dr. Pb Marks, Keely Ramos CNP, Marina Barker [facility] [phone]  Dr. Valeria Suresh [phone]  Dr. Man Jenkins [phone]    Please have the pulmonary function testing done after completing the Doxycyline.  Do not take Doxycycline with Calcium, Magnesium, Iron, Aluminum (in some antacids), or other mineral supplements, as they can block the absorption of Doxycycline. Please be aware that Doxycycline makes the skin very sensitive to sunlight, such that you may burn very easily, so stay out of the sun.     Recommend seeing an eye doctor at least annually for a diabetic eye exam. Be sure to visually inspect your feet every day, looking for cuts, scrapes, infections. Propping a mirror against the wall at an angle can be used to help you see the bottoms of your feet, if necessary.  For non-insulin dependents diabetes, check your fasting (12 hours) glucose at least once a week.  Check your glucose 2 to 4 hours after meals, to help understand how different foods affect your glucose. Use this information to help guide food choices, to minimize spikes in glucose. If frequently getting numbers > 200, a medication change or improved medication adherence might be necessary. Seek immediate medical attention (ER, 911) for glucose < 55 or > 400, altered consciousness, seizure, significant dehydration, or other significant concern.  Advise follow-up appointments usually every 3 months.    Ophthalmology  Dr. Caleb Marie, et al. [phone]  Dr. Tyron Vazquez, Dr. Teresa Rizo, et al. [phone]  Dr. Philipp Vo [phone]  Dr. Deejay Hunt et al.  "[phone]    Urology  Dr. Kristopher Edmond, Dr. Vicente Horner ([place]) [phone]  Shelly Merida CNP ([place]) [phone]  Dr. Goddard, Dr. Echols, Dr. Nicole, Dr. Duffy ([place]) [phone]  Dr. Rell Stacy ([place]) [phone]  Dr. Sergio Eagle, Dr. Karissa Jorge, Dr. Chau Michael, Dr. Jennifer Simms, et al. ([place]) [phone]  Dr. Sumeet Valenzuela et al. ([place]) [phone]    Monitor your resting blood pressure (BP) and heart rate (HR) at home. Resting BP should be fairly consistently better than 140/90, preferably better than 130/80. Please bring your blood pressure cuff to your appointments. In about 2 weeks, please report to the office your blood pressure and heart rate numbers.   For a list of validated BP cuffs: https://www.validatebp.org/     Please return for a(n) diabetes, blood pressure, cholesterol, results, and medication follow-up appointment in 3 months, after tests to review results and options, earlier if any question or concern. Please schedule additional problem-focused appointment(s) to address additional problem(s).    Recommended vaccines:  Influenza, annual  Prevnar-20 \"pneumonia\" vaccine  Respiratory Syncytial Virus (RSV)  Shingrix (shingles) vaccine series  TDaP (tetanus-diphtheria-pertussis) vaccine  Avoid taking Biotin (a vitamin, shows up particularly in hair/nail supplements) for a week prior to any blood tests, as it can interfere with certain results. Fasting for labs means 12 hours, nothing to eat or drink, except water and medications, unless directed otherwise.    For assistance with scheduling referrals or consultations, please call [phone]. For laboratory, radiology, and other tests, please call [phone] ([phone] for pediatrics). Please review prescription inserts and published information for possible adverse effects of all medications. Return after testing or consultation to review results and recommendations, if symptoms persist, " change, worsen, or return, or if you have any question or concern. If you do not get results within 7-10 days, or you have any question or concern, please send a message, call the office ([phone]), or return to the office for a follow-up appointment. For non-emergencies, you may call the office. For emergencies, call 9-1-1 or go to the nearest Emergency Department. Please schedule additional appointment(s) to address concern(s) not addressed today.    In general, results are not released or discussed over the telephone, but at an appointment or via  [employer]. Results of tests done through [facility] are released via  [employer] (see below).  [email or web address]   [employer] support line: [phone]

## 2024-09-26 ENCOUNTER — APPOINTMENT (OUTPATIENT)
Dept: OPERATING ROOM | Facility: HOSPITAL | Age: 69
End: 2024-09-26

## 2024-10-16 ENCOUNTER — APPOINTMENT (OUTPATIENT)
Dept: PRIMARY CARE | Facility: CLINIC | Age: 69
End: 2024-10-16

## 2024-10-28 ENCOUNTER — TELEPHONE (OUTPATIENT)
Dept: CARDIOLOGY | Facility: CLINIC | Age: 69
End: 2024-10-28

## 2024-10-31 ENCOUNTER — OFFICE VISIT (OUTPATIENT)
Dept: CARDIOLOGY | Facility: CLINIC | Age: 69
End: 2024-10-31

## 2024-10-31 VITALS
DIASTOLIC BLOOD PRESSURE: 77 MMHG | HEART RATE: 79 BPM | WEIGHT: 250.5 LBS | OXYGEN SATURATION: 97 % | HEIGHT: 71 IN | SYSTOLIC BLOOD PRESSURE: 130 MMHG | BODY MASS INDEX: 35.07 KG/M2

## 2024-10-31 DIAGNOSIS — E11.42 TYPE 2 DIABETES MELLITUS WITH DIABETIC POLYNEUROPATHY, WITHOUT LONG-TERM CURRENT USE OF INSULIN: ICD-10-CM

## 2024-10-31 DIAGNOSIS — I20.89 STABLE ANGINA (CMS-HCC): ICD-10-CM

## 2024-10-31 DIAGNOSIS — R07.89 CHEST PRESSURE: Primary | ICD-10-CM

## 2024-10-31 DIAGNOSIS — I25.10 CORONARY ARTERY DISEASE INVOLVING NATIVE CORONARY ARTERY OF NATIVE HEART WITHOUT ANGINA PECTORIS: ICD-10-CM

## 2024-10-31 DIAGNOSIS — I10 HYPERTENSION, UNSPECIFIED TYPE: ICD-10-CM

## 2024-10-31 DIAGNOSIS — E78.2 MIXED HYPERLIPIDEMIA: ICD-10-CM

## 2024-10-31 DIAGNOSIS — R05.3 CHRONIC COUGH: ICD-10-CM

## 2024-10-31 PROCEDURE — 1159F MED LIST DOCD IN RCRD: CPT | Performed by: INTERNAL MEDICINE

## 2024-10-31 PROCEDURE — 3008F BODY MASS INDEX DOCD: CPT | Performed by: INTERNAL MEDICINE

## 2024-10-31 PROCEDURE — 3078F DIAST BP <80 MM HG: CPT | Performed by: INTERNAL MEDICINE

## 2024-10-31 PROCEDURE — 99205 OFFICE O/P NEW HI 60 MIN: CPT | Performed by: INTERNAL MEDICINE

## 2024-10-31 PROCEDURE — 1036F TOBACCO NON-USER: CPT | Performed by: INTERNAL MEDICINE

## 2024-10-31 PROCEDURE — 93005 ELECTROCARDIOGRAM TRACING: CPT | Performed by: INTERNAL MEDICINE

## 2024-10-31 PROCEDURE — 4010F ACE/ARB THERAPY RXD/TAKEN: CPT | Performed by: INTERNAL MEDICINE

## 2024-10-31 PROCEDURE — 99215 OFFICE O/P EST HI 40 MIN: CPT | Performed by: INTERNAL MEDICINE

## 2024-10-31 PROCEDURE — 3075F SYST BP GE 130 - 139MM HG: CPT | Performed by: INTERNAL MEDICINE

## 2024-10-31 RX ORDER — NITROGLYCERIN 0.4 MG/1
0.4 TABLET SUBLINGUAL EVERY 5 MIN PRN
Qty: 25 TABLET | Refills: 2 | Status: SHIPPED | OUTPATIENT
Start: 2024-10-31

## 2024-10-31 RX ORDER — ROSUVASTATIN CALCIUM 10 MG/1
10 TABLET, COATED ORAL NIGHTLY
Qty: 90 TABLET | Refills: 3 | Status: SHIPPED | OUTPATIENT
Start: 2024-10-31 | End: 2025-10-31

## 2024-10-31 RX ORDER — CARVEDILOL 3.12 MG/1
3.12 TABLET ORAL 2 TIMES DAILY
Qty: 180 TABLET | Refills: 3 | Status: SHIPPED | OUTPATIENT
Start: 2024-10-31 | End: 2025-10-31

## 2024-10-31 RX ORDER — LOSARTAN POTASSIUM 25 MG/1
25 TABLET ORAL DAILY
Qty: 90 TABLET | Refills: 3 | Status: SHIPPED | OUTPATIENT
Start: 2024-10-31 | End: 2025-10-31

## 2024-10-31 ASSESSMENT — ENCOUNTER SYMPTOMS
ALTERED MENTAL STATUS: 0
HEMATURIA: 0
CONSTIPATION: 0
DEPRESSION: 0
FALLS: 0
HEADACHES: 0
BLOATING: 0
ABDOMINAL PAIN: 0
VOMITING: 0
FEVER: 0
WHEEZING: 0
COUGH: 0
HEMOPTYSIS: 0
MEMORY LOSS: 0
MYALGIAS: 0
DYSURIA: 0
DIARRHEA: 0
NAUSEA: 0
CHILLS: 0

## 2024-11-01 LAB
ATRIAL RATE: 80 BPM
P AXIS: 59 DEGREES
P OFFSET: 159 MS
P ONSET: 125 MS
PR INTERVAL: 180 MS
Q ONSET: 215 MS
QRS COUNT: 13 BEATS
QRS DURATION: 108 MS
QT INTERVAL: 376 MS
QTC CALCULATION(BAZETT): 433 MS
QTC FREDERICIA: 414 MS
R AXIS: 40 DEGREES
T AXIS: 112 DEGREES
T OFFSET: 403 MS
VENTRICULAR RATE: 80 BPM

## 2024-11-27 ENCOUNTER — TELEPHONE (OUTPATIENT)
Dept: CARDIOLOGY | Facility: HOSPITAL | Age: 69
End: 2024-11-27

## 2024-11-27 DIAGNOSIS — I20.89 STABLE ANGINA (CMS-HCC): ICD-10-CM

## 2024-11-27 RX ORDER — NITROGLYCERIN 0.4 MG/1
0.4 TABLET SUBLINGUAL EVERY 5 MIN PRN
Qty: 25 TABLET | Refills: 3 | Status: SHIPPED | OUTPATIENT
Start: 2024-11-27

## 2024-11-29 ENCOUNTER — HOSPITAL ENCOUNTER (INPATIENT)
Facility: HOSPITAL | Age: 69
LOS: 1 days | Discharge: ANOTHER HOSPITAL | DRG: 281 | End: 2024-11-29
Attending: STUDENT IN AN ORGANIZED HEALTH CARE EDUCATION/TRAINING PROGRAM | Admitting: INTERNAL MEDICINE
Payer: MEDICARE

## 2024-11-29 ENCOUNTER — APPOINTMENT (OUTPATIENT)
Dept: CARDIOLOGY | Facility: HOSPITAL | Age: 69
DRG: 281 | End: 2024-11-29
Payer: MEDICARE

## 2024-11-29 ENCOUNTER — HOSPITAL ENCOUNTER (INPATIENT)
Facility: HOSPITAL | Age: 69
End: 2024-11-29
Attending: INTERNAL MEDICINE | Admitting: INTERNAL MEDICINE
Payer: MEDICARE

## 2024-11-29 ENCOUNTER — APPOINTMENT (OUTPATIENT)
Dept: CARDIOLOGY | Facility: HOSPITAL | Age: 69
End: 2024-11-29
Payer: MEDICARE

## 2024-11-29 ENCOUNTER — HOSPITAL ENCOUNTER (OUTPATIENT)
Dept: CARDIOLOGY | Facility: HOSPITAL | Age: 69
Discharge: HOME | End: 2024-11-29

## 2024-11-29 ENCOUNTER — APPOINTMENT (OUTPATIENT)
Dept: RADIOLOGY | Facility: HOSPITAL | Age: 69
DRG: 281 | End: 2024-11-29
Payer: MEDICARE

## 2024-11-29 VITALS
HEIGHT: 71 IN | DIASTOLIC BLOOD PRESSURE: 80 MMHG | RESPIRATION RATE: 19 BRPM | WEIGHT: 253.53 LBS | TEMPERATURE: 98.1 F | HEART RATE: 82 BPM | SYSTOLIC BLOOD PRESSURE: 143 MMHG | OXYGEN SATURATION: 100 % | BODY MASS INDEX: 35.49 KG/M2

## 2024-11-29 DIAGNOSIS — I21.4 NSTEMI (NON-ST ELEVATED MYOCARDIAL INFARCTION) (MULTI): Primary | ICD-10-CM

## 2024-11-29 DIAGNOSIS — I20.9 ANGINA PECTORIS, UNSPECIFIED: ICD-10-CM

## 2024-11-29 LAB
ALBUMIN SERPL BCP-MCNC: 3.8 G/DL (ref 3.4–5)
ALBUMIN SERPL BCP-MCNC: 3.9 G/DL (ref 3.4–5)
ALBUMIN SERPL BCP-MCNC: 4.4 G/DL (ref 3.4–5)
ALP SERPL-CCNC: 76 U/L (ref 33–136)
ALP SERPL-CCNC: 83 U/L (ref 33–136)
ALT SERPL W P-5'-P-CCNC: 25 U/L (ref 10–52)
ALT SERPL W P-5'-P-CCNC: 26 U/L (ref 10–52)
ANION GAP BLDV CALCULATED.4IONS-SCNC: 12 MMOL/L (ref 10–25)
ANION GAP SERPL CALC-SCNC: 13 MMOL/L (ref 10–20)
ANION GAP SERPL CALC-SCNC: 14 MMOL/L (ref 10–20)
ANION GAP SERPL CALC-SCNC: 15 MMOL/L (ref 10–20)
AORTIC VALVE MEAN GRADIENT: 3 MMHG
AORTIC VALVE PEAK VELOCITY: 1.13 M/S
APTT PPP: 193 SECONDS (ref 27–38)
AST SERPL W P-5'-P-CCNC: 19 U/L (ref 9–39)
AST SERPL W P-5'-P-CCNC: 42 U/L (ref 9–39)
AV PEAK GRADIENT: 5 MMHG
AVA (PEAK VEL): 3.56 CM2
AVA (VTI): 3.11 CM2
BASE EXCESS BLDV CALC-SCNC: 0.8 MMOL/L (ref -2–3)
BASOPHILS # BLD AUTO: 0.03 X10*3/UL (ref 0–0.1)
BASOPHILS # BLD AUTO: 0.04 X10*3/UL (ref 0–0.1)
BASOPHILS NFR BLD AUTO: 0.3 %
BASOPHILS NFR BLD AUTO: 0.4 %
BILIRUB DIRECT SERPL-MCNC: 0.1 MG/DL (ref 0–0.3)
BILIRUB SERPL-MCNC: 0.4 MG/DL (ref 0–1.2)
BILIRUB SERPL-MCNC: 0.5 MG/DL (ref 0–1.2)
BNP SERPL-MCNC: 72 PG/ML (ref 0–99)
BODY TEMPERATURE: 37 DEGREES CELSIUS
BUN SERPL-MCNC: 25 MG/DL (ref 6–23)
BUN SERPL-MCNC: 27 MG/DL (ref 6–23)
BUN SERPL-MCNC: 29 MG/DL (ref 6–23)
CA-I BLDV-SCNC: 1.21 MMOL/L (ref 1.1–1.33)
CALCIUM SERPL-MCNC: 8.6 MG/DL (ref 8.6–10.3)
CALCIUM SERPL-MCNC: 8.7 MG/DL (ref 8.6–10.6)
CALCIUM SERPL-MCNC: 9.4 MG/DL (ref 8.6–10.3)
CARDIAC TROPONIN I PNL SERPL HS: 1778 NG/L (ref 0–20)
CARDIAC TROPONIN I PNL SERPL HS: 23 NG/L (ref 0–20)
CARDIAC TROPONIN I PNL SERPL HS: 38 NG/L (ref 0–20)
CHLORIDE BLDV-SCNC: 104 MMOL/L (ref 98–107)
CHLORIDE SERPL-SCNC: 105 MMOL/L (ref 98–107)
CHLORIDE SERPL-SCNC: 105 MMOL/L (ref 98–107)
CHLORIDE SERPL-SCNC: 99 MMOL/L (ref 98–107)
CHOLEST SERPL-MCNC: 121 MG/DL (ref 0–199)
CHOLESTEROL/HDL RATIO: 3.9
CO2 SERPL-SCNC: 22 MMOL/L (ref 21–32)
CO2 SERPL-SCNC: 22 MMOL/L (ref 21–32)
CO2 SERPL-SCNC: 27 MMOL/L (ref 21–32)
CREAT SERPL-MCNC: 1.36 MG/DL (ref 0.5–1.3)
CREAT SERPL-MCNC: 1.6 MG/DL (ref 0.5–1.3)
CREAT SERPL-MCNC: 1.77 MG/DL (ref 0.5–1.3)
EGFRCR SERPLBLD CKD-EPI 2021: 42 ML/MIN/1.73M*2
EGFRCR SERPLBLD CKD-EPI 2021: 47 ML/MIN/1.73M*2
EGFRCR SERPLBLD CKD-EPI 2021: 57 ML/MIN/1.73M*2
EJECTION FRACTION APICAL 4 CHAMBER: 27.4
EJECTION FRACTION: 32 %
EOSINOPHIL # BLD AUTO: 0.19 X10*3/UL (ref 0–0.7)
EOSINOPHIL # BLD AUTO: 0.38 X10*3/UL (ref 0–0.7)
EOSINOPHIL NFR BLD AUTO: 1.6 %
EOSINOPHIL NFR BLD AUTO: 3.6 %
ERYTHROCYTE [DISTWIDTH] IN BLOOD BY AUTOMATED COUNT: 13.2 % (ref 11.5–14.5)
ERYTHROCYTE [DISTWIDTH] IN BLOOD BY AUTOMATED COUNT: 13.2 % (ref 11.5–14.5)
ERYTHROCYTE [DISTWIDTH] IN BLOOD BY AUTOMATED COUNT: 13.3 % (ref 11.5–14.5)
EST. AVERAGE GLUCOSE BLD GHB EST-MCNC: 166 MG/DL
GLUCOSE BLD MANUAL STRIP-MCNC: 140 MG/DL (ref 74–99)
GLUCOSE BLD MANUAL STRIP-MCNC: 166 MG/DL (ref 74–99)
GLUCOSE BLD MANUAL STRIP-MCNC: 189 MG/DL (ref 74–99)
GLUCOSE BLD MANUAL STRIP-MCNC: 193 MG/DL (ref 74–99)
GLUCOSE BLDV-MCNC: 222 MG/DL (ref 74–99)
GLUCOSE SERPL-MCNC: 146 MG/DL (ref 74–99)
GLUCOSE SERPL-MCNC: 185 MG/DL (ref 74–99)
GLUCOSE SERPL-MCNC: 201 MG/DL (ref 74–99)
HBA1C MFR BLD: 7.4 %
HCO3 BLDV-SCNC: 24.3 MMOL/L (ref 22–26)
HCT VFR BLD AUTO: 43.7 % (ref 41–52)
HCT VFR BLD AUTO: 44 % (ref 41–52)
HCT VFR BLD AUTO: 48.1 % (ref 41–52)
HCT VFR BLD EST: 45 % (ref 41–52)
HDLC SERPL-MCNC: 30.8 MG/DL
HGB BLD-MCNC: 14.7 G/DL (ref 13.5–17.5)
HGB BLD-MCNC: 14.8 G/DL (ref 13.5–17.5)
HGB BLD-MCNC: 16.2 G/DL (ref 13.5–17.5)
HGB BLDV-MCNC: 14.9 G/DL (ref 13.5–17.5)
IMM GRANULOCYTES # BLD AUTO: 0.04 X10*3/UL (ref 0–0.7)
IMM GRANULOCYTES # BLD AUTO: 0.06 X10*3/UL (ref 0–0.7)
IMM GRANULOCYTES NFR BLD AUTO: 0.3 % (ref 0–0.9)
IMM GRANULOCYTES NFR BLD AUTO: 0.6 % (ref 0–0.9)
INHALED O2 CONCENTRATION: 21 %
INR PPP: 1 (ref 0.9–1.1)
INR PPP: 1 (ref 0.9–1.1)
LACTATE BLDV-SCNC: 2 MMOL/L (ref 0.4–2)
LACTATE SERPL-SCNC: 1.7 MMOL/L (ref 0.4–2)
LDLC SERPL CALC-MCNC: 63 MG/DL
LEFT ATRIUM VOLUME AREA LENGTH INDEX BSA: 19.2 ML/M2
LEFT VENTRICLE INTERNAL DIMENSION DIASTOLE: 6.41 CM (ref 3.5–6)
LEFT VENTRICULAR OUTFLOW TRACT DIAMETER: 2.44 CM
LYMPHOCYTES # BLD AUTO: 1.9 X10*3/UL (ref 1.2–4.8)
LYMPHOCYTES # BLD AUTO: 3.39 X10*3/UL (ref 1.2–4.8)
LYMPHOCYTES NFR BLD AUTO: 16.2 %
LYMPHOCYTES NFR BLD AUTO: 32 %
MAGNESIUM SERPL-MCNC: 1.63 MG/DL (ref 1.6–2.4)
MAGNESIUM SERPL-MCNC: 1.78 MG/DL (ref 1.6–2.4)
MAGNESIUM SERPL-MCNC: 1.8 MG/DL (ref 1.6–2.4)
MCH RBC QN AUTO: 28.7 PG (ref 26–34)
MCH RBC QN AUTO: 28.8 PG (ref 26–34)
MCH RBC QN AUTO: 29 PG (ref 26–34)
MCHC RBC AUTO-ENTMCNC: 33.6 G/DL (ref 32–36)
MCHC RBC AUTO-ENTMCNC: 33.6 G/DL (ref 32–36)
MCHC RBC AUTO-ENTMCNC: 33.7 G/DL (ref 32–36)
MCV RBC AUTO: 85 FL (ref 80–100)
MCV RBC AUTO: 86 FL (ref 80–100)
MCV RBC AUTO: 86 FL (ref 80–100)
MITRAL VALVE E/A RATIO: 0.54
MONOCYTES # BLD AUTO: 0.88 X10*3/UL (ref 0.1–1)
MONOCYTES # BLD AUTO: 0.89 X10*3/UL (ref 0.1–1)
MONOCYTES NFR BLD AUTO: 7.5 %
MONOCYTES NFR BLD AUTO: 8.4 %
NEUTROPHILS # BLD AUTO: 5.85 X10*3/UL (ref 1.2–7.7)
NEUTROPHILS # BLD AUTO: 8.71 X10*3/UL (ref 1.2–7.7)
NEUTROPHILS NFR BLD AUTO: 55 %
NEUTROPHILS NFR BLD AUTO: 74.1 %
NON HDL CHOLESTEROL: 90 MG/DL (ref 0–149)
NRBC BLD-RTO: 0 /100 WBCS (ref 0–0)
OXYHGB MFR BLDV: 71 % (ref 45–75)
PCO2 BLDV: 35 MM HG (ref 41–51)
PH BLDV: 7.45 PH (ref 7.33–7.43)
PHOSPHATE SERPL-MCNC: 2.8 MG/DL (ref 2.5–4.9)
PHOSPHATE SERPL-MCNC: 3 MG/DL (ref 2.5–4.9)
PLATELET # BLD AUTO: 203 X10*3/UL (ref 150–450)
PLATELET # BLD AUTO: 224 X10*3/UL (ref 150–450)
PLATELET # BLD AUTO: 252 X10*3/UL (ref 150–450)
PO2 BLDV: 45 MM HG (ref 35–45)
POTASSIUM BLDV-SCNC: 4.4 MMOL/L (ref 3.5–5.3)
POTASSIUM SERPL-SCNC: 4 MMOL/L (ref 3.5–5.3)
POTASSIUM SERPL-SCNC: 4.2 MMOL/L (ref 3.5–5.3)
POTASSIUM SERPL-SCNC: 4.4 MMOL/L (ref 3.5–5.3)
PROT SERPL-MCNC: 6.2 G/DL (ref 6.4–8.2)
PROT SERPL-MCNC: 7.6 G/DL (ref 6.4–8.2)
PROTHROMBIN TIME: 11 SECONDS (ref 9.8–12.8)
PROTHROMBIN TIME: 11.6 SECONDS (ref 9.8–12.8)
RBC # BLD AUTO: 5.1 X10*6/UL (ref 4.5–5.9)
RBC # BLD AUTO: 5.16 X10*6/UL (ref 4.5–5.9)
RBC # BLD AUTO: 5.59 X10*6/UL (ref 4.5–5.9)
RIGHT VENTRICLE FREE WALL PEAK S': 9 CM/S
RIGHT VENTRICLE PEAK SYSTOLIC PRESSURE: 4.9 MMHG
SAO2 % BLDV: 73 % (ref 45–75)
SODIUM BLDV-SCNC: 136 MMOL/L (ref 136–145)
SODIUM SERPL-SCNC: 136 MMOL/L (ref 136–145)
SODIUM SERPL-SCNC: 136 MMOL/L (ref 136–145)
SODIUM SERPL-SCNC: 138 MMOL/L (ref 136–145)
TRICUSPID ANNULAR PLANE SYSTOLIC EXCURSION: 2.3 CM
TRIGL SERPL-MCNC: 134 MG/DL (ref 0–149)
TSH SERPL-ACNC: 2.18 MIU/L (ref 0.44–3.98)
UFH PPP CHRO-ACNC: 0.6 IU/ML
UFH PPP CHRO-ACNC: 0.7 IU/ML
VLDL: 27 MG/DL (ref 0–40)
WBC # BLD AUTO: 10.6 X10*3/UL (ref 4.4–11.3)
WBC # BLD AUTO: 11.8 X10*3/UL (ref 4.4–11.3)
WBC # BLD AUTO: 9.2 X10*3/UL (ref 4.4–11.3)

## 2024-11-29 PROCEDURE — 85520 HEPARIN ASSAY: CPT | Performed by: STUDENT IN AN ORGANIZED HEALTH CARE EDUCATION/TRAINING PROGRAM

## 2024-11-29 PROCEDURE — 93005 ELECTROCARDIOGRAM TRACING: CPT

## 2024-11-29 PROCEDURE — 85520 HEPARIN ASSAY: CPT

## 2024-11-29 PROCEDURE — 85730 THROMBOPLASTIN TIME PARTIAL: CPT

## 2024-11-29 PROCEDURE — 2020000001 HC ICU ROOM DAILY

## 2024-11-29 PROCEDURE — 93306 TTE W/DOPPLER COMPLETE: CPT | Performed by: INTERNAL MEDICINE

## 2024-11-29 PROCEDURE — 2500000001 HC RX 250 WO HCPCS SELF ADMINISTERED DRUGS (ALT 637 FOR MEDICARE OP)

## 2024-11-29 PROCEDURE — 80053 COMPREHEN METABOLIC PANEL: CPT

## 2024-11-29 PROCEDURE — 99152 MOD SED SAME PHYS/QHP 5/>YRS: CPT | Performed by: INTERNAL MEDICINE

## 2024-11-29 PROCEDURE — C1894 INTRO/SHEATH, NON-LASER: HCPCS | Performed by: INTERNAL MEDICINE

## 2024-11-29 PROCEDURE — 84075 ASSAY ALKALINE PHOSPHATASE: CPT | Performed by: STUDENT IN AN ORGANIZED HEALTH CARE EDUCATION/TRAINING PROGRAM

## 2024-11-29 PROCEDURE — 84484 ASSAY OF TROPONIN QUANT: CPT

## 2024-11-29 PROCEDURE — C1887 CATHETER, GUIDING: HCPCS | Performed by: INTERNAL MEDICINE

## 2024-11-29 PROCEDURE — 99291 CRITICAL CARE FIRST HOUR: CPT

## 2024-11-29 PROCEDURE — B2111ZZ FLUOROSCOPY OF MULTIPLE CORONARY ARTERIES USING LOW OSMOLAR CONTRAST: ICD-10-PCS | Performed by: INTERNAL MEDICINE

## 2024-11-29 PROCEDURE — 4A023N7 MEASUREMENT OF CARDIAC SAMPLING AND PRESSURE, LEFT HEART, PERCUTANEOUS APPROACH: ICD-10-PCS | Performed by: INTERNAL MEDICINE

## 2024-11-29 PROCEDURE — 99291 CRITICAL CARE FIRST HOUR: CPT | Mod: 25 | Performed by: STUDENT IN AN ORGANIZED HEALTH CARE EDUCATION/TRAINING PROGRAM

## 2024-11-29 PROCEDURE — 84443 ASSAY THYROID STIM HORMONE: CPT

## 2024-11-29 PROCEDURE — C8929 TTE W OR WO FOL WCON,DOPPLER: HCPCS

## 2024-11-29 PROCEDURE — 93454 CORONARY ARTERY ANGIO S&I: CPT | Performed by: INTERNAL MEDICINE

## 2024-11-29 PROCEDURE — 80061 LIPID PANEL: CPT

## 2024-11-29 PROCEDURE — 93308 TTE F-UP OR LMTD: CPT | Performed by: STUDENT IN AN ORGANIZED HEALTH CARE EDUCATION/TRAINING PROGRAM

## 2024-11-29 PROCEDURE — 2500000004 HC RX 250 GENERAL PHARMACY W/ HCPCS (ALT 636 FOR OP/ED): Performed by: STUDENT IN AN ORGANIZED HEALTH CARE EDUCATION/TRAINING PROGRAM

## 2024-11-29 PROCEDURE — 99153 MOD SED SAME PHYS/QHP EA: CPT | Performed by: INTERNAL MEDICINE

## 2024-11-29 PROCEDURE — 71045 X-RAY EXAM CHEST 1 VIEW: CPT

## 2024-11-29 PROCEDURE — 2500000004 HC RX 250 GENERAL PHARMACY W/ HCPCS (ALT 636 FOR OP/ED): Performed by: INTERNAL MEDICINE

## 2024-11-29 PROCEDURE — 2720000007 HC OR 272 NO HCPCS: Performed by: INTERNAL MEDICINE

## 2024-11-29 PROCEDURE — 84132 ASSAY OF SERUM POTASSIUM: CPT

## 2024-11-29 PROCEDURE — 83735 ASSAY OF MAGNESIUM: CPT | Performed by: STUDENT IN AN ORGANIZED HEALTH CARE EDUCATION/TRAINING PROGRAM

## 2024-11-29 PROCEDURE — 84484 ASSAY OF TROPONIN QUANT: CPT | Performed by: STUDENT IN AN ORGANIZED HEALTH CARE EDUCATION/TRAINING PROGRAM

## 2024-11-29 PROCEDURE — 71045 X-RAY EXAM CHEST 1 VIEW: CPT | Performed by: RADIOLOGY

## 2024-11-29 PROCEDURE — 85610 PROTHROMBIN TIME: CPT | Performed by: STUDENT IN AN ORGANIZED HEALTH CARE EDUCATION/TRAINING PROGRAM

## 2024-11-29 PROCEDURE — 84100 ASSAY OF PHOSPHORUS: CPT

## 2024-11-29 PROCEDURE — 83605 ASSAY OF LACTIC ACID: CPT

## 2024-11-29 PROCEDURE — 86850 RBC ANTIBODY SCREEN: CPT

## 2024-11-29 PROCEDURE — 93010 ELECTROCARDIOGRAM REPORT: CPT | Performed by: INTERNAL MEDICINE

## 2024-11-29 PROCEDURE — 82947 ASSAY GLUCOSE BLOOD QUANT: CPT

## 2024-11-29 PROCEDURE — 85610 PROTHROMBIN TIME: CPT

## 2024-11-29 PROCEDURE — 2500000001 HC RX 250 WO HCPCS SELF ADMINISTERED DRUGS (ALT 637 FOR MEDICARE OP): Performed by: STUDENT IN AN ORGANIZED HEALTH CARE EDUCATION/TRAINING PROGRAM

## 2024-11-29 PROCEDURE — 96375 TX/PRO/DX INJ NEW DRUG ADDON: CPT | Mod: 59

## 2024-11-29 PROCEDURE — 83036 HEMOGLOBIN GLYCOSYLATED A1C: CPT | Mod: GEALAB

## 2024-11-29 PROCEDURE — 2500000002 HC RX 250 W HCPCS SELF ADMINISTERED DRUGS (ALT 637 FOR MEDICARE OP, ALT 636 FOR OP/ED)

## 2024-11-29 PROCEDURE — 83880 ASSAY OF NATRIURETIC PEPTIDE: CPT

## 2024-11-29 PROCEDURE — 84075 ASSAY ALKALINE PHOSPHATASE: CPT

## 2024-11-29 PROCEDURE — 2500000004 HC RX 250 GENERAL PHARMACY W/ HCPCS (ALT 636 FOR OP/ED)

## 2024-11-29 PROCEDURE — 85025 COMPLETE CBC W/AUTO DIFF WBC: CPT

## 2024-11-29 PROCEDURE — 99223 1ST HOSP IP/OBS HIGH 75: CPT | Performed by: INTERNAL MEDICINE

## 2024-11-29 PROCEDURE — 85025 COMPLETE CBC W/AUTO DIFF WBC: CPT | Performed by: STUDENT IN AN ORGANIZED HEALTH CARE EDUCATION/TRAINING PROGRAM

## 2024-11-29 PROCEDURE — 83735 ASSAY OF MAGNESIUM: CPT

## 2024-11-29 PROCEDURE — 96374 THER/PROPH/DIAG INJ IV PUSH: CPT | Mod: 59

## 2024-11-29 PROCEDURE — 2500000002 HC RX 250 W HCPCS SELF ADMINISTERED DRUGS (ALT 637 FOR MEDICARE OP, ALT 636 FOR OP/ED): Performed by: STUDENT IN AN ORGANIZED HEALTH CARE EDUCATION/TRAINING PROGRAM

## 2024-11-29 PROCEDURE — 85027 COMPLETE CBC AUTOMATED: CPT

## 2024-11-29 PROCEDURE — 2550000001 HC RX 255 CONTRASTS: Performed by: INTERNAL MEDICINE

## 2024-11-29 PROCEDURE — 36415 COLL VENOUS BLD VENIPUNCTURE: CPT | Performed by: STUDENT IN AN ORGANIZED HEALTH CARE EDUCATION/TRAINING PROGRAM

## 2024-11-29 RX ORDER — ATORVASTATIN CALCIUM 80 MG/1
80 TABLET, FILM COATED ORAL NIGHTLY
Status: DISCONTINUED | OUTPATIENT
Start: 2024-11-29 | End: 2024-12-01

## 2024-11-29 RX ORDER — PANTOPRAZOLE SODIUM 40 MG/1
40 TABLET, DELAYED RELEASE ORAL
Status: DISCONTINUED | OUTPATIENT
Start: 2024-11-30 | End: 2024-12-06 | Stop reason: HOSPADM

## 2024-11-29 RX ORDER — CARVEDILOL 3.12 MG/1
6.25 TABLET ORAL 2 TIMES DAILY
Status: DISCONTINUED | OUTPATIENT
Start: 2024-11-29 | End: 2024-11-29 | Stop reason: HOSPADM

## 2024-11-29 RX ORDER — DEXTROSE 50 % IN WATER (D50W) INTRAVENOUS SYRINGE
25
Status: DISCONTINUED | OUTPATIENT
Start: 2024-11-29 | End: 2024-11-29 | Stop reason: HOSPADM

## 2024-11-29 RX ORDER — DEXTROSE 50 % IN WATER (D50W) INTRAVENOUS SYRINGE
25
Status: DISCONTINUED | OUTPATIENT
Start: 2024-11-29 | End: 2024-12-06 | Stop reason: HOSPADM

## 2024-11-29 RX ORDER — ACETAMINOPHEN 650 MG/1
650 SUPPOSITORY RECTAL EVERY 4 HOURS PRN
Status: DISCONTINUED | OUTPATIENT
Start: 2024-11-29 | End: 2024-11-29 | Stop reason: HOSPADM

## 2024-11-29 RX ORDER — ROSUVASTATIN CALCIUM 10 MG/1
10 TABLET, COATED ORAL NIGHTLY
Status: DISCONTINUED | OUTPATIENT
Start: 2024-11-29 | End: 2024-11-29

## 2024-11-29 RX ORDER — FENTANYL CITRATE-0.9 % NACL/PF 10 MCG/ML
PLASTIC BAG, INJECTION (ML) INTRAVENOUS CONTINUOUS PRN
Status: DISCONTINUED | OUTPATIENT
Start: 2024-11-29 | End: 2024-11-29 | Stop reason: HOSPADM

## 2024-11-29 RX ORDER — INSULIN LISPRO 100 [IU]/ML
0-10 INJECTION, SOLUTION INTRAVENOUS; SUBCUTANEOUS
Status: DISCONTINUED | OUTPATIENT
Start: 2024-11-29 | End: 2024-11-29 | Stop reason: HOSPADM

## 2024-11-29 RX ORDER — ROSUVASTATIN CALCIUM 10 MG/1
20 TABLET, COATED ORAL NIGHTLY
Status: DISCONTINUED | OUTPATIENT
Start: 2024-11-29 | End: 2024-11-29 | Stop reason: HOSPADM

## 2024-11-29 RX ORDER — ASPIRIN 81 MG/1
81 TABLET ORAL DAILY
Status: DISCONTINUED | OUTPATIENT
Start: 2024-11-29 | End: 2024-12-06 | Stop reason: HOSPADM

## 2024-11-29 RX ORDER — IPRATROPIUM BROMIDE AND ALBUTEROL SULFATE 2.5; .5 MG/3ML; MG/3ML
3 SOLUTION RESPIRATORY (INHALATION) EVERY 6 HOURS PRN
Status: DISCONTINUED | OUTPATIENT
Start: 2024-11-29 | End: 2024-12-06 | Stop reason: HOSPADM

## 2024-11-29 RX ORDER — ACETAMINOPHEN 160 MG/5ML
650 SOLUTION ORAL EVERY 4 HOURS PRN
Status: DISCONTINUED | OUTPATIENT
Start: 2024-11-29 | End: 2024-11-29 | Stop reason: HOSPADM

## 2024-11-29 RX ORDER — NITROGLYCERIN 0.4 MG/1
0.4 TABLET SUBLINGUAL EVERY 5 MIN PRN
Status: DISCONTINUED | OUTPATIENT
Start: 2024-11-29 | End: 2024-11-29 | Stop reason: HOSPADM

## 2024-11-29 RX ORDER — LOSARTAN POTASSIUM 50 MG/1
25 TABLET ORAL DAILY
Status: DISCONTINUED | OUTPATIENT
Start: 2024-11-29 | End: 2024-11-29 | Stop reason: HOSPADM

## 2024-11-29 RX ORDER — CARVEDILOL 3.12 MG/1
3.12 TABLET ORAL 2 TIMES DAILY
Status: DISCONTINUED | OUTPATIENT
Start: 2024-11-29 | End: 2024-11-29

## 2024-11-29 RX ORDER — ISOSORBIDE DINITRATE 10 MG/1
10 TABLET ORAL
Status: DISCONTINUED | OUTPATIENT
Start: 2024-11-29 | End: 2024-12-01

## 2024-11-29 RX ORDER — CARVEDILOL 3.12 MG/1
3.12 TABLET ORAL 2 TIMES DAILY
Status: DISCONTINUED | OUTPATIENT
Start: 2024-11-29 | End: 2024-11-30

## 2024-11-29 RX ORDER — NITROGLYCERIN 20 MG/100ML
5-200 INJECTION INTRAVENOUS CONTINUOUS
Status: DISCONTINUED | OUTPATIENT
Start: 2024-11-29 | End: 2024-11-29 | Stop reason: HOSPADM

## 2024-11-29 RX ORDER — ACETAMINOPHEN 325 MG/1
975 TABLET ORAL ONCE
Status: COMPLETED | OUTPATIENT
Start: 2024-11-29 | End: 2024-11-29

## 2024-11-29 RX ORDER — NITROGLYCERIN 20 MG/100ML
5-200 INJECTION INTRAVENOUS CONTINUOUS
Status: DISCONTINUED | OUTPATIENT
Start: 2024-11-29 | End: 2024-11-30

## 2024-11-29 RX ORDER — HEPARIN SODIUM 10000 [USP'U]/100ML
0-4500 INJECTION, SOLUTION INTRAVENOUS CONTINUOUS
Status: DISCONTINUED | OUTPATIENT
Start: 2024-11-29 | End: 2024-11-29 | Stop reason: HOSPADM

## 2024-11-29 RX ORDER — ESOMEPRAZOLE MAGNESIUM 40 MG/1
40 GRANULE, DELAYED RELEASE ORAL
Status: DISCONTINUED | OUTPATIENT
Start: 2024-11-30 | End: 2024-12-02

## 2024-11-29 RX ORDER — VERAPAMIL HYDROCHLORIDE 2.5 MG/ML
INJECTION, SOLUTION INTRAVENOUS AS NEEDED
Status: DISCONTINUED | OUTPATIENT
Start: 2024-11-29 | End: 2024-11-29 | Stop reason: HOSPADM

## 2024-11-29 RX ORDER — HEPARIN SODIUM 10000 [USP'U]/100ML
0-4000 INJECTION, SOLUTION INTRAVENOUS CONTINUOUS
Status: DISCONTINUED | OUTPATIENT
Start: 2024-11-29 | End: 2024-12-02

## 2024-11-29 RX ORDER — FENTANYL CITRATE 50 UG/ML
INJECTION, SOLUTION INTRAMUSCULAR; INTRAVENOUS AS NEEDED
Status: DISCONTINUED | OUTPATIENT
Start: 2024-11-29 | End: 2024-11-29 | Stop reason: HOSPADM

## 2024-11-29 RX ORDER — HEPARIN SODIUM 10000 [USP'U]/100ML
0-4500 INJECTION, SOLUTION INTRAVENOUS CONTINUOUS
Status: DISCONTINUED | OUTPATIENT
Start: 2024-11-29 | End: 2024-11-29

## 2024-11-29 RX ORDER — INSULIN LISPRO 100 [IU]/ML
0-5 INJECTION, SOLUTION INTRAVENOUS; SUBCUTANEOUS
Status: DISCONTINUED | OUTPATIENT
Start: 2024-11-30 | End: 2024-12-06 | Stop reason: HOSPADM

## 2024-11-29 RX ORDER — DEXTROSE 50 % IN WATER (D50W) INTRAVENOUS SYRINGE
12.5
Status: DISCONTINUED | OUTPATIENT
Start: 2024-11-29 | End: 2024-11-29 | Stop reason: HOSPADM

## 2024-11-29 RX ORDER — PANTOPRAZOLE SODIUM 40 MG/10ML
40 INJECTION, POWDER, LYOPHILIZED, FOR SOLUTION INTRAVENOUS
Status: DISCONTINUED | OUTPATIENT
Start: 2024-11-30 | End: 2024-12-02

## 2024-11-29 RX ORDER — ASPIRIN 81 MG/1
81 TABLET ORAL DAILY
Status: DISCONTINUED | OUTPATIENT
Start: 2024-11-30 | End: 2024-11-29 | Stop reason: HOSPADM

## 2024-11-29 RX ORDER — ALBUTEROL SULFATE 90 UG/1
2 INHALANT RESPIRATORY (INHALATION) EVERY 4 HOURS PRN
Status: DISCONTINUED | OUTPATIENT
Start: 2024-11-29 | End: 2024-11-29 | Stop reason: HOSPADM

## 2024-11-29 RX ORDER — SODIUM CHLORIDE 9 MG/ML
INJECTION, SOLUTION INTRAVENOUS CONTINUOUS PRN
Status: COMPLETED | OUTPATIENT
Start: 2024-11-29 | End: 2024-11-29

## 2024-11-29 RX ORDER — HEPARIN SODIUM 1000 [USP'U]/ML
INJECTION, SOLUTION INTRAVENOUS; SUBCUTANEOUS AS NEEDED
Status: DISCONTINUED | OUTPATIENT
Start: 2024-11-29 | End: 2024-11-29 | Stop reason: HOSPADM

## 2024-11-29 RX ORDER — LIDOCAINE HYDROCHLORIDE 20 MG/ML
INJECTION, SOLUTION INFILTRATION; PERINEURAL AS NEEDED
Status: DISCONTINUED | OUTPATIENT
Start: 2024-11-29 | End: 2024-11-29 | Stop reason: HOSPADM

## 2024-11-29 RX ORDER — INSULIN LISPRO 100 [IU]/ML
0-10 INJECTION, SOLUTION INTRAVENOUS; SUBCUTANEOUS EVERY 4 HOURS
Status: DISCONTINUED | OUTPATIENT
Start: 2024-11-29 | End: 2024-11-29

## 2024-11-29 RX ORDER — DEXTROSE 50 % IN WATER (D50W) INTRAVENOUS SYRINGE
12.5
Status: DISCONTINUED | OUTPATIENT
Start: 2024-11-29 | End: 2024-12-06 | Stop reason: HOSPADM

## 2024-11-29 RX ORDER — ACETAMINOPHEN 325 MG/1
650 TABLET ORAL EVERY 4 HOURS PRN
Status: DISCONTINUED | OUTPATIENT
Start: 2024-11-29 | End: 2024-11-29 | Stop reason: HOSPADM

## 2024-11-29 RX ORDER — MIDAZOLAM HYDROCHLORIDE 1 MG/ML
INJECTION, SOLUTION INTRAMUSCULAR; INTRAVENOUS AS NEEDED
Status: DISCONTINUED | OUTPATIENT
Start: 2024-11-29 | End: 2024-11-29 | Stop reason: HOSPADM

## 2024-11-29 SDOH — SOCIAL STABILITY: SOCIAL INSECURITY: WERE YOU ABLE TO COMPLETE ALL THE BEHAVIORAL HEALTH SCREENINGS?: YES

## 2024-11-29 SDOH — ECONOMIC STABILITY: INCOME INSECURITY: IN THE PAST 12 MONTHS HAS THE ELECTRIC, GAS, OIL, OR WATER COMPANY THREATENED TO SHUT OFF SERVICES IN YOUR HOME?: NO

## 2024-11-29 SDOH — SOCIAL STABILITY: SOCIAL INSECURITY: WITHIN THE LAST YEAR, HAVE YOU BEEN HUMILIATED OR EMOTIONALLY ABUSED IN OTHER WAYS BY YOUR PARTNER OR EX-PARTNER?: NO

## 2024-11-29 SDOH — SOCIAL STABILITY: SOCIAL INSECURITY: WITHIN THE LAST YEAR, HAVE YOU BEEN AFRAID OF YOUR PARTNER OR EX-PARTNER?: NO

## 2024-11-29 SDOH — SOCIAL STABILITY: SOCIAL INSECURITY
WITHIN THE LAST YEAR, HAVE YOU BEEN RAPED OR FORCED TO HAVE ANY KIND OF SEXUAL ACTIVITY BY YOUR PARTNER OR EX-PARTNER?: NO

## 2024-11-29 SDOH — ECONOMIC STABILITY: FOOD INSECURITY: WITHIN THE PAST 12 MONTHS, YOU WORRIED THAT YOUR FOOD WOULD RUN OUT BEFORE YOU GOT THE MONEY TO BUY MORE.: NEVER TRUE

## 2024-11-29 SDOH — SOCIAL STABILITY: SOCIAL INSECURITY: HAVE YOU HAD ANY THOUGHTS OF HARMING ANYONE ELSE?: NO

## 2024-11-29 SDOH — SOCIAL STABILITY: SOCIAL INSECURITY: ARE YOU OR HAVE YOU BEEN THREATENED OR ABUSED PHYSICALLY, EMOTIONALLY, OR SEXUALLY BY ANYONE?: NO

## 2024-11-29 SDOH — SOCIAL STABILITY: SOCIAL INSECURITY: ABUSE: ADULT

## 2024-11-29 SDOH — SOCIAL STABILITY: SOCIAL INSECURITY
WITHIN THE LAST YEAR, HAVE YOU BEEN KICKED, HIT, SLAPPED, OR OTHERWISE PHYSICALLY HURT BY YOUR PARTNER OR EX-PARTNER?: NO

## 2024-11-29 SDOH — SOCIAL STABILITY: SOCIAL INSECURITY: ARE THERE ANY APPARENT SIGNS OF INJURIES/BEHAVIORS THAT COULD BE RELATED TO ABUSE/NEGLECT?: NO

## 2024-11-29 SDOH — ECONOMIC STABILITY: FOOD INSECURITY: WITHIN THE PAST 12 MONTHS, THE FOOD YOU BOUGHT JUST DIDN'T LAST AND YOU DIDN'T HAVE MONEY TO GET MORE.: NEVER TRUE

## 2024-11-29 SDOH — SOCIAL STABILITY: SOCIAL INSECURITY: HAVE YOU HAD THOUGHTS OF HARMING ANYONE ELSE?: NO

## 2024-11-29 SDOH — SOCIAL STABILITY: SOCIAL INSECURITY: HAS ANYONE EVER THREATENED TO HURT YOUR FAMILY OR YOUR PETS?: NO

## 2024-11-29 SDOH — SOCIAL STABILITY: SOCIAL INSECURITY: DO YOU FEEL ANYONE HAS EXPLOITED OR TAKEN ADVANTAGE OF YOU FINANCIALLY OR OF YOUR PERSONAL PROPERTY?: NO

## 2024-11-29 SDOH — SOCIAL STABILITY: SOCIAL INSECURITY: DOES ANYONE TRY TO KEEP YOU FROM HAVING/CONTACTING OTHER FRIENDS OR DOING THINGS OUTSIDE YOUR HOME?: NO

## 2024-11-29 SDOH — SOCIAL STABILITY: SOCIAL INSECURITY: DO YOU FEEL UNSAFE GOING BACK TO THE PLACE WHERE YOU ARE LIVING?: NO

## 2024-11-29 ASSESSMENT — LIFESTYLE VARIABLES
HAVE YOU EVER FELT YOU SHOULD CUT DOWN ON YOUR DRINKING: NO
HOW OFTEN DO YOU HAVE A DRINK CONTAINING ALCOHOL: NEVER
HOW OFTEN DO YOU HAVE 6 OR MORE DRINKS ON ONE OCCASION: NEVER
HOW OFTEN DO YOU HAVE A DRINK CONTAINING ALCOHOL: NEVER
AUDIT-C TOTAL SCORE: 0
HOW OFTEN DO YOU HAVE 6 OR MORE DRINKS ON ONE OCCASION: NEVER
SKIP TO QUESTIONS 9-10: 1
HAVE PEOPLE ANNOYED YOU BY CRITICIZING YOUR DRINKING: NO
AUDIT-C TOTAL SCORE: 0
TOTAL SCORE: 0
HOW MANY STANDARD DRINKS CONTAINING ALCOHOL DO YOU HAVE ON A TYPICAL DAY: PATIENT DOES NOT DRINK
HOW MANY STANDARD DRINKS CONTAINING ALCOHOL DO YOU HAVE ON A TYPICAL DAY: PATIENT DOES NOT DRINK
EVER FELT BAD OR GUILTY ABOUT YOUR DRINKING: NO
EVER HAD A DRINK FIRST THING IN THE MORNING TO STEADY YOUR NERVES TO GET RID OF A HANGOVER: NO
SKIP TO QUESTIONS 9-10: 1

## 2024-11-29 ASSESSMENT — PAIN - FUNCTIONAL ASSESSMENT
PAIN_FUNCTIONAL_ASSESSMENT: 0-10

## 2024-11-29 ASSESSMENT — ENCOUNTER SYMPTOMS
MUSCULOSKELETAL NEGATIVE: 1
HEMATOLOGIC/LYMPHATIC NEGATIVE: 1
NEUROLOGICAL NEGATIVE: 1
PSYCHIATRIC NEGATIVE: 1
GASTROINTESTINAL NEGATIVE: 1
CHEST TIGHTNESS: 1
ALLERGIC/IMMUNOLOGIC NEGATIVE: 1
ENDOCRINE NEGATIVE: 1
CONSTITUTIONAL NEGATIVE: 1
EYES NEGATIVE: 1

## 2024-11-29 ASSESSMENT — ACTIVITIES OF DAILY LIVING (ADL)
ADEQUATE_TO_COMPLETE_ADL: YES
WALKS IN HOME: INDEPENDENT
HEARING - RIGHT EAR: FUNCTIONAL
PATIENT'S MEMORY ADEQUATE TO SAFELY COMPLETE DAILY ACTIVITIES?: YES
WALKS IN HOME: INDEPENDENT
PATIENT'S MEMORY ADEQUATE TO SAFELY COMPLETE DAILY ACTIVITIES?: YES
TOILETING: INDEPENDENT
BATHING: INDEPENDENT
FEEDING YOURSELF: INDEPENDENT
JUDGMENT_ADEQUATE_SAFELY_COMPLETE_DAILY_ACTIVITIES: YES
BATHING: INDEPENDENT
DRESSING YOURSELF: INDEPENDENT
ADEQUATE_TO_COMPLETE_ADL: YES
GROOMING: INDEPENDENT
TOILETING: INDEPENDENT
DRESSING YOURSELF: INDEPENDENT
HEARING - LEFT EAR: FUNCTIONAL
GROOMING: INDEPENDENT
LACK_OF_TRANSPORTATION: NO
HEARING - LEFT EAR: FUNCTIONAL
LACK_OF_TRANSPORTATION: NO
LACK_OF_TRANSPORTATION: NO
FEEDING YOURSELF: INDEPENDENT
HEARING - RIGHT EAR: FUNCTIONAL
JUDGMENT_ADEQUATE_SAFELY_COMPLETE_DAILY_ACTIVITIES: YES

## 2024-11-29 ASSESSMENT — COGNITIVE AND FUNCTIONAL STATUS - GENERAL
DAILY ACTIVITIY SCORE: 24
DAILY ACTIVITIY SCORE: 24
MOBILITY SCORE: 24
MOBILITY SCORE: 24
PATIENT BASELINE BEDBOUND: NO

## 2024-11-29 ASSESSMENT — PATIENT HEALTH QUESTIONNAIRE - PHQ9
1. LITTLE INTEREST OR PLEASURE IN DOING THINGS: NOT AT ALL
1. LITTLE INTEREST OR PLEASURE IN DOING THINGS: NOT AT ALL
2. FEELING DOWN, DEPRESSED OR HOPELESS: NOT AT ALL
SUM OF ALL RESPONSES TO PHQ9 QUESTIONS 1 & 2: 0
2. FEELING DOWN, DEPRESSED OR HOPELESS: NOT AT ALL
SUM OF ALL RESPONSES TO PHQ9 QUESTIONS 1 & 2: 0

## 2024-11-29 ASSESSMENT — COLUMBIA-SUICIDE SEVERITY RATING SCALE - C-SSRS
2. HAVE YOU ACTUALLY HAD ANY THOUGHTS OF KILLING YOURSELF?: NO
1. IN THE PAST MONTH, HAVE YOU WISHED YOU WERE DEAD OR WISHED YOU COULD GO TO SLEEP AND NOT WAKE UP?: NO
6. HAVE YOU EVER DONE ANYTHING, STARTED TO DO ANYTHING, OR PREPARED TO DO ANYTHING TO END YOUR LIFE?: NO

## 2024-11-29 ASSESSMENT — PAIN SCALES - GENERAL
PAINLEVEL_OUTOF10: 4
PAINLEVEL_OUTOF10: 0 - NO PAIN
PAINLEVEL_OUTOF10: 1
PAINLEVEL_OUTOF10: 0 - NO PAIN
PAINLEVEL_OUTOF10: 0 - NO PAIN
PAINLEVEL_OUTOF10: 4
PAINLEVEL_OUTOF10: 4
PAINLEVEL_OUTOF10: 0 - NO PAIN
PAINLEVEL_OUTOF10: 1
PAINLEVEL_OUTOF10: 1
PAINLEVEL_OUTOF10: 0 - NO PAIN
PAINLEVEL_OUTOF10: 0 - NO PAIN

## 2024-11-29 ASSESSMENT — PAIN DESCRIPTION - LOCATION: LOCATION: CHEST

## 2024-11-29 NOTE — H&P
HPI   Lukasz Rodrigues is a 69 y.o. male with a PMHx of CAD s/p stent (~10 yrs ago at [facility] by Dr Arriaza), hypertension, hyperlipidemia, DM2 (A1C 7.9% 6/2024) who [place] [facility] [place]24 with mid sternal chest pain, not relieved with rest or nitroglycerine. Patient is admitted to ICU on 11/29/24 for ongoing chest pain requiring nitroglycerine drip in the setting of unstable angina or NSTEMI.     Per patient, frequency and severity of chest pain/pressure has been increasing in the past few weeks after seeing cardiology (Dr New Lacy) on 10/31.  Patient started having chest pain at rest last evening. Took 3 nitroglycerine tabs and 3 81mg aspirin tabs at home which did not provide him any releief, hence called for EMS. He received an additional dose of ASA 81 by EMS for the total of 324mg of aspirin. Upon arrival to ED, patient was still having 8/10 mid sternal chest pain with tingling in his right fingers.     Currently, endorses improvement in chest pain 3/10 with nitroglycerine drip, but continues to have chest pressure. Denies associated HA, fever/chills, nausea/vomiting, chest pain/discomfort, orthopnea/PND, SOB, palpitations, abdominal pain, changes to weight, appetite, urination or bowel movement.  ROS: 10 point review of systems negative with the exception of above.    ED Course:  Vitals: T 98.6F   BP  154/89    HR 98     RR 20     SPO2  100% room air  Labs:  - CBC: unremarkable  - RFP: unremarkable except Cr 1.77 (last Cr in chart 1.23 09/2023)  - Trop: 23->38  - LFTs: unremarkable  - Blood glucose: 201  - PT / INR: 1.0/11.0  - EKG: NSR, T wave inversions on aVL? (Was previously there in EKG OCT 2024)    Imaging:  - CXR: No consolidation, pleural effusion or pneumothorax. Low lung volumes with bronchovascular crowding. Bibasilar atelectasis.    Intervention: In ED, patient received 1L NS bolus, Brilinta 180mg once, Tylenol. Started on Nitroglycerine drip and Heparin drip. Cardio consulted by ED.  "Patient was then transferred to the ICU for further management.    Upon arrival to MICU:  Vitals:  BP     HR      RR    SPO2  room air  Drips: nitroglycerin     PMH: mentioned above in HPI  PSH: mentioned above in HPI  FH: noncontributory     Social Hx:  - EtOH: Denies  - Tobacco: Denies  - Illicit Drugs: Denies  - Occupation:      Allergies: as documented in EMR  Meds: as documented in med rec    Past Medical History     Past Medical History:   Diagnosis Date    Abscess 11/12/2018    Foot pain, bilateral 11/12/2018    Other fatigue 11/12/2018        Surgical History   No past surgical history on file.    Family History   No family history on file.    Social History     Tobacco Use: Low Risk  (10/31/2024)    Patient History     Smoking Tobacco Use: Never     Smokeless Tobacco Use: Never     Passive Exposure: Not on file        Social History     Substance and Sexual Activity   Alcohol Use Never      Allergies   No Known Allergies   Meds    Scheduled medications  [START ON 11/30/2024] ticagrelor, 90 mg, oral, BID      Continuous medications  heparin, 0-4,500 Units/hr, Last Rate: 2,000 Units/hr (11/29/24 0351)  nitroglycerin in 5 % dextrose, 5-200 mcg/min, Last Rate: 20 mcg/min (11/29/24 0332)      PRN medications  PRN medications: acetaminophen **OR** acetaminophen **OR** acetaminophen, heparin     Objective    Physical Exam   Constitutional: Appears stated age. In NAD.   HEENT: NC/AT, EOMI, clear sclera, moist mucous membranes  CV: RRR, No M/R/G  PULM: CTAB, no coughing or wheezing  ABDOMEN: Soft, NT/ND. No TTP. + BSx4.  SKIN: Normal Color, Warm, Dry, No Rashes   EXTREMITIES: Non-Tender, Full ROM, No Pedal Edema  NEURO: A&O x 4, nonfocal neurological exam.  PSYCH: Normal Mood & Behavior    Visit Vitals  BP (!) 162/104   Pulse 93   Temp 37 °C (98.6 °F) (Temporal)   Resp 19   Ht 1.803 m (5' 11\")   Wt 113 kg (250 lb)   SpO2 98%   BMI 34.87 kg/m²   Smoking Status Never   BSA 2.38 m²        No intake or " output data in the 24 hours ending 11/29/24 0515          Labs:   Results from last 72 hours   Lab Units 11/29/24  0257   SODIUM mmol/L 136   POTASSIUM mmol/L 4.4   CHLORIDE mmol/L 99   CO2 mmol/L 27   BUN mg/dL 29*   CREATININE mg/dL 1.77*   GLUCOSE mg/dL 201*   CALCIUM mg/dL 9.4   ANION GAP mmol/L 14   EGFR mL/min/1.73m*2 42*      Results from last 72 hours   Lab Units 11/29/24  0257   WBC AUTO x10*3/uL 10.6   HEMOGLOBIN g/dL 16.2   HEMATOCRIT % 48.1   PLATELETS AUTO x10*3/uL 252   NEUTROS PCT AUTO % 55.0   LYMPHS PCT AUTO % 32.0   MONOS PCT AUTO % 8.4   EOS PCT AUTO % 3.6      Lab Results   Component Value Date    CALCIUM 9.4 11/29/2024     Images    Point of Care Ultrasound    Result Date: 11/29/2024  Gerry Diaz MD     11/29/2024  3:36 AM Performed by: Gerry Diaz MD Authorized by: Gerry Diaz MD  Cardiac Indications: chest pain Procedure: Cardiac Ultrasound Findings:  Views: parasternal long, apical four and subxiphoid The pericardial space was visualized and was NEGATIVE for a significant pericardial effusion. Activity: Ventricular contractions were visualized. LV: LV systolic function was NORMAL. RV: RV size was NORMAL. Impression: Cardiac: The focused cardiac ultrasound exam was NORMAL.     XR chest 1 view    Result Date: 11/29/2024  Interpreted By:  Topher Coyle, STUDY: XR CHEST 1 VIEW;  11/29/2024 3:28 am   INDICATION: Signs/Symptoms:Chest Pain.   COMPARISON: Chest x-ray 10/17/2023   ACCESSION NUMBER(S): [id number]   ORDERING CLINICIAN: GERRY DIAZ   FINDINGS: Multiple overlying leads are present.   CARDIOMEDIASTINAL SILHOUETTE: Cardiomediastinal silhouette is normal in size and configuration.   LUNGS: No consolidation, pleural effusion or pneumothorax. Note is made of low lung volumes with bronchovascular crowding. Bibasilar atelectasis.   ABDOMEN: No remarkable upper abdominal findings.   BONES: No acute osseous abnormality.       Hypoventilatory changes with bibasilar atelectasis. No  consolidation.   MACRO: None   Signed by: Topher Coyle 11/29/2024 3:32 AM Dictation workstation:   [id number]    ECG 12 lead (Clinic Performed)    Result Date: 11/5/2024  Sinus rhythm with frequent and consecutive Premature ventricular complexes Nonspecific ST-T changes Abnormal ECG No previous ECGs available Confirmed by New Lacy ([id number]) on 11/5/2024 5:06:24 PM     Encounter Date: 10/31/24   ECG 12 lead (Clinic Performed)   Result Value    Ventricular Rate 80    Atrial Rate 80    ND Interval 180    QRS Duration 108    QT Interval 376    QTC Calculation(Bazett) 433    P Axis 59    R Axis 40    T Axis 112    QRS Count 13    Q Onset 215    P Onset 125    P Offset 159    T Offset 403    QTC Fredericia 414    Narrative    Sinus rhythm with frequent and consecutive Premature ventricular complexes  Nonspecific ST-T changes  Abnormal ECG  No previous ECGs available  Confirmed by New Lacy ([id number]) on 11/5/2024 5:06:24 PM      Encounter Date: 10/31/24   ECG 12 lead (Clinic Performed)   Result Value    Ventricular Rate 80    Atrial Rate 80    ND Interval 180    QRS Duration 108    QT Interval 376    QTC Calculation(Bazett) 433    P Axis 59    R Axis 40    T Axis 112    QRS Count 13    Q Onset 215    P Onset 125    P Offset 159    T Offset 403    QTC Fredericia 414    Narrative    Sinus rhythm with frequent and consecutive Premature ventricular complexes  Nonspecific ST-T changes  Abnormal ECG  No previous ECGs available  Confirmed by New Lacy ([id number]) on 11/5/2024 5:06:24 PM     Assessment and Plan    Lukasz Rodrigues is a 69 y.o. male with a PMHx of CAD s/p stent ~'12, hypertension, hyperlipidemia, DM2 (A1C 7.9% 6/2024) who presented to [facility] ED on 11/29/24 with midsternal chest pain, not relieved with rest or nitroglycerine.   Patient os admitted to ICU on 11/29/24 for ongoing chest pain requiring nitroglycerine drip in the setting of unstable angina or NSTEMI.     Neuro:  - no acute issues      CV:  #Unstable  angina or NSTEMI   #Troponemia  #Hc of CAD s/p stent   - presented with chest pain, not relieved with rest or nitroglycerin tabs. EKG showed T wave inversions in aVL? Will obtain another EKG.  - Heart Score: 7;  BELIA Score: 6  - Trop: 23->38->trend  - No prior echo in EMR. TTE pending.   - On ASA + Brilinta 90mg BID   - On Heparin drip  - Started on Nitroglycerine drip for chest pain in ED  - Cardio consulted by ED    #HTN/HLD  - Continue home carvedilol   - Holding Losartan due to suspected BRUNILDA  - Continue rosuvastatin 10mg     Pulm/GI:  - no acute issues    Renal:  #Suspected BRUNILDA  - On admission Cr 1.77 (last Cr in chart 1.23 09/2023)  - Will continue to monitor     Endo:  #T2DM  - A1C 7.9% 6/2024)  - SSI    ID:  - no acute issues    Fluids: PRN  Electrolytes: replete as needed  Nutrition: NPO  DVT PPX: Heparin drip  GI ppx: none  Bowel care: none  Lines: PIVs  Drips: Heparin, nitroglycerin  Consults: Cardio    Code Status: Full Code     Disposition: 69 y.o.male admitted for chest pain 2/2 NSTEMI/unstable angina requiring Nitro drup and Heparin drip. Cardio consulted. Continue management.

## 2024-11-29 NOTE — HOSPITAL COURSE
Lukasz Rodrigues is a 69 y.o. year old male patient with PMHx of  CAD s/p stent ~'12, hypertension, hyperlipidemia, DM2 (A1C 7.9% 6/2024)  admitted on 11/29/2024 with complaint of mid sternal chest pain not relieved by nitroglycerine. Per patient, he had been having chest pain occasionally over the past 1-2 months that would occur with exertion. He followed with his cardiologist (Dr. Lacy) who gave him nitroglycerin. He had been taking the nitroglycerin as needed for the chest pain and it would resolve within minutes, however, he had chest pain early in the morning on 11/29 that was not relieved with 3 doses of nitroglycerin, so he called EMS. EKG showed T wave inversions in aVL, consistent with prior EKG. Troponin mildly elevated at 23 and 38 on admission with repeat elevated to 1778. Concern is for unstable angina vs. NSTEMI. Nitroglycerin gtt started in the ED for management of chest pain and cardiology was consulted, patient admitted to ICU for continuation of nitroglycerin gtt. Was also loaded with aspirin and Brilinta and started on heparin gtt for management of possible NSTEMI. TTE showed LVEF of 32% and abnormal left ventricular wall motion. 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 Patient to be transferred to [facility] for cardiac MRI and evaluation for CABG.     Prior to transfer patient was on: home carvedilol, home rosuvastatin, nitroglycerin sublingual tabs - received 1x-  (drip was held after cath due to resolution of chest pain), sliding scale insulin 0-10 units, heparin gtt (this was held for heart cath and schedule to resume at 18:30 which is 2 hours after TR band was removed), aspirin 81 daily. Brilinta was discontinued by cardiology. Home losartan had been held in the  setting of BRUNILDA.    Transferred to [facility] on 11/29 around 17:30 in stable condition.

## 2024-11-29 NOTE — CONSULTS
"Inpatient consult to Cardiology  Consult performed by: Asha Rodriguez PA-C  Consult ordered by: Con Mason MD        History Of Present Illness:    Lukasz Rodrigues is a 69 y.o. male presenting with with central chest pressure ongoing for \"a while\". Patient states he was having this pain intermittently with exertion, would improve with rest or ASA. He reestablished cardiac care with Dr. Vallecillo 3 weeks ago, was prescribed sublingual nitro. He reports his chest pain initially responded to nitro therapy but prior to presentation he took two doses of nitro without resolution. He repots associated shortness of breath. Upon arrival SBP >200. Pt was started on a Heparin and nitro gtt which relieved these symptoms. He is currently chest pain free.      Last Recorded Vitals:  Vitals:    11/29/24 0600 11/29/24 0619 11/29/24 0714 11/29/24 0800   BP: 161/87 (!) 162/97 123/86 142/89   BP Location:       Patient Position:       Pulse: 88 93 81 80   Resp: 19 19 14 17   Temp:   36.5 °C (97.7 °F)    TempSrc:   Temporal    SpO2: 99% 100% 98% 99%   Weight: 115 kg (253 lb 8.5 oz)      Height:           Last Labs:  CBC - 11/29/2024:  8:26 AM  9.2 14.8 203    44.0      CMP - 11/29/2024:  2:57 AM  9.4 7.6 19 --- 0.4   _ 4.4 26 83      PTT - No results in last year.  1.0   11.0 _     Troponin I, High Sensitivity   Date/Time Value Ref Range Status   11/29/2024 03:54 AM 38 (H) 0 - 20 ng/L Final   11/29/2024 02:57 AM 23 (H) 0 - 20 ng/L Final     BNP   Date/Time Value Ref Range Status   11/29/2024 02:57 AM 72 0 - 99 pg/mL Final     POC HEMOGLOBIN A1c   Date/Time Value Ref Range Status   06/24/2024 10:43 AM 7.9 (A) 4.2 - 6.5 % Final   09/19/2023 04:51 PM 8.0 (A) 4.2 - 6.5 % Final     LDL Calculated   Date/Time Value Ref Range Status   11/29/2024 03:54 AM 63 <=99 mg/dL Final     Comment:                                 Near   Borderline      AGE      Desirable  Optimal    High     High     Very High     0-19 Y     0 - 109     ---    " "110-129   >/= 130     ----    20-24 Y     0 - 119     ---    120-159   >/= 160     ----      >24 Y     0 -  99   100-129  130-159   160-189     >/=190       VLDL   Date/Time Value Ref Range Status   11/29/2024 03:54 AM 27 0 - 40 mg/dL Final   09/28/2023 08:14 AM 43 (H) 0 - 40 mg/dL Final      Last I/O:  I/O last 3 completed shifts:  In: 1159.1 (10.1 mL/kg) [I.V.:159.1 (1.4 mL/kg); IV Piggyback:1000]  Out: - (0 mL/kg)   Weight: 115 kg     Past Cardiology Tests (Last 3 Years):  EKG:  ECG 12 lead (Clinic Performed) 11/29/2024 NSR, 84 bpm with PVCs, high lateral TWI    Echo:  Transthoracic Echocardiogram (11/29/24):      Ejection Fractions:  No results found for: \"EF\"  Cath:  No results found for this or any previous visit from the past 1095 days.    Stress Test:  No results found for this or any previous visit from the past 1095 days.    Cardiac Imaging:  No results found for this or any previous visit from the past 1095 days.      Past Medical History:  He has a past medical history of Abscess (11/12/2018), Foot pain, bilateral (11/12/2018), and Other fatigue (11/12/2018).    Past Surgical History:  He has no past surgical history on file.      Social History:  He reports that he has never smoked. He has never used smokeless tobacco. He reports that he does not drink alcohol and does not use drugs.    Family History:  No family history on file.     Allergies:  Patient has no known allergies.    Inpatient Medications:  Scheduled medications   Medication Dose Route Frequency    [START ON 11/30/2024] aspirin  81 mg oral Daily    [Held by provider] carvedilol  3.125 mg oral BID    insulin lispro  0-10 Units subcutaneous q4h    [Held by provider] losartan  25 mg oral Daily    perflutren lipid microspheres  0.5-10 mL of dilution intravenous Once in imaging    perflutren protein A microsphere  0.5 mL intravenous Once in imaging    rosuvastatin  10 mg oral Nightly    sulfur hexafluoride microsphr  2 mL intravenous Once in " imaging    [START ON 11/30/2024] ticagrelor  90 mg oral BID     PRN medications   Medication    acetaminophen    Or    acetaminophen    Or    acetaminophen    albuterol    heparin    [Held by provider] nitroglycerin     Continuous Medications   Medication Dose Last Rate    heparin  0-4,500 Units/hr 2,000 Units/hr (11/29/24 0624)    nitroglycerin in 5 % dextrose  5-200 mcg/min 20 mcg/min (11/29/24 0624)     Outpatient Medications:  Current Outpatient Medications   Medication Instructions    albuterol (Proventil HFA) 90 mcg/actuation inhaler 2 puffs, inhalation, Every 4 hours PRN    aspirin 81 mg, Daily    carvedilol (COREG) 3.125 mg, oral, 2 times daily    losartan (COZAAR) 25 mg, oral, Daily    nitroglycerin (NITROSTAT) 0.4 mg, sublingual, Every 5 min PRN, Call 911 if third dose is used or chest pain persists.    rosuvastatin (CRESTOR) 10 mg, oral, Nightly       Physical Exam:  Physical Exam  Constitutional:       General: He is not in acute distress.  HENT:      Head: Normocephalic.      Nose: Nose normal.      Mouth/Throat:      Mouth: Mucous membranes are moist.   Eyes:      Conjunctiva/sclera: Conjunctivae normal.   Neck:      Vascular: No carotid bruit.   Cardiovascular:      Rate and Rhythm: Normal rate and regular rhythm.      Pulses: Normal pulses.      Heart sounds: Normal heart sounds. No murmur heard.  Pulmonary:      Effort: Pulmonary effort is normal.   Abdominal:      Palpations: Abdomen is soft.   Musculoskeletal:         General: No swelling.      Cervical back: Neck supple.      Right lower leg: No edema.      Left lower leg: No edema.   Skin:     General: Skin is warm and dry.   Neurological:      General: No focal deficit present.      Mental Status: He is alert. Mental status is at baseline.   Psychiatric:         Mood and Affect: Mood normal.         Behavior: Behavior normal.            Assessment/Plan   Lukasz Rodrigues is a 70 yo male with a PMH of CAD s/p PCI (>10y ago) in [place], HTN, HLD, DM  Type II (A1c 7.4) who presented with central chest pain, significant HTN.     NSTEMI  Hypertensive emergency  Chronic ischemic heart disease s/p remote PCI  HLD    Underwent cardiac catheterization which demonstrated multivessel disease and echocardiogram demonstrated severe left ventricular systolic dysfunction.    Will transfer him to [facility] for evaluation by cardiology and cardiothoracic surgery for likely bypass surgery, will need cardiac MRI for viability.    Continue aspirin 81 mg daily and rosuvastatin 20 mg nightly for ACS.  Stop Brilinta; he received 180 mg bolus on 11/29/2024 at 0422 and then another 90 mg pill on 11/29/2024 at 1351.  Resume heparin drip, without bolus, 2 hours after TR band is removed.  No need to keep the patient on only nitroglycerin drip; he was on 5 to 10 mcg/min, and even while off of it, experienced no angina.    Guideline directed medical therapy for heart failure: Carvedilol 6.25 mg twice a day, hold ACEi/ARB/NI/MRA given likely need for bypass surgery.    Aggressive blood pressure control with goal systolic blood pressure less than 140 mmHg.      Peripheral IV 11/29/24 20 G Right Hand (Active)   Site Assessment Clean;Dry;Intact 11/29/24 0554   Number of days: 0       Code Status:  Full Code    I spent  minutes in the professional and overall care of this patient.        Asha Rodriguez PA-C

## 2024-11-29 NOTE — ED PROCEDURE NOTE
Procedure    Performed by: Con Mason MD  Authorized by: Con Mason MD  Cardiac Indications: chest pain                Procedure: Cardiac Ultrasound    Findings:   Views: parasternal long, apical four and subxiphoid  The pericardial space was visualized and was NEGATIVE for a significant pericardial effusion.  Activity: Ventricular contractions were visualized.  LV: LV systolic function was NORMAL.  RV: RV size was NORMAL.    Impression:  Cardiac: The focused cardiac ultrasound exam was NORMAL.                   Con Mason MD  11/29/24 0336

## 2024-11-29 NOTE — PROGRESS NOTES
11/29/24 1539   Discharge Planning   Living Arrangements Alone   Support Systems Children;Family members;Spouse/significant other   Assistance Needed Patient is A&O X3, on room air at baseline, is independent with ADLs, drives and uses no DME at home. Patient denies further needs upon discharge.   Type of Residence Private residence   Number of Stairs to Enter Residence 1   Number of Stairs Within Residence 2   Who is requesting discharge planning? Provider   Home or Post Acute Services None   Expected Discharge Disposition Short Term A  (Patient is awaiting transfer to [facility])   Does the patient need discharge transport arranged? Yes   RoundTrip coordination needed? Yes   Has discharge transport been arranged? No   What day is the transport expected? 11/29/24   Financial Resource Strain   How hard is it for you to pay for the very basics like food, housing, medical care, and heating? Not hard   Housing Stability   In the last 12 months, was there a time when you were not able to pay the mortgage or rent on time? N   At any time in the past 12 months, were you homeless or living in a shelter (including now)? N   Transportation Needs   In the past 12 months, has lack of transportation kept you from medical appointments or from getting medications? no   In the past 12 months, has lack of transportation kept you from meetings, work, or from getting things needed for daily living? No   Stroke Family Assessment   Stroke Family Assessment Needed No   Intensity of Service   Intensity of Service 0-30 min

## 2024-11-29 NOTE — DISCHARGE SUMMARY
Discharge Diagnosis  NSTEMI (non-ST elevated myocardial infarction) (Multi)  HFrEF (LVEF 32%)  BRUNILDA on CKD  T2DM with hyperglycemia     Issues Requiring Follow-Up  NSTEMI (non-ST elevated myocardial infarction) (Multi) - heparin gtt needs to be resumed at 18:30  BRUNILDA on CKD  T2DM with hyperglycemia  HFrEF (LVEF 32%) - GDMT after CABG      Discharge Meds     Medication List      CONTINUE taking these medications     albuterol 90 mcg/actuation inhaler; Commonly known as: Proventil HFA;   Inhale 2 puffs every 4 hours if needed for wheezing, shortness of breath   or other (cough).   aspirin 81 mg EC tablet   carvedilol 3.125 mg tablet; Commonly known as: Coreg; Take 1 tablet   (3.125 mg) by mouth 2 times a day.   losartan 25 mg tablet; Commonly known as: Cozaar; Take 1 tablet (25 mg)   by mouth once daily.   nitroglycerin 0.4 mg SL tablet; Commonly known as: Nitrostat; Place 1   tablet (0.4 mg) under the tongue every 5 minutes if needed for chest pain.   Call 911 if third dose is used or chest pain persists.   rosuvastatin 10 mg tablet; Commonly known as: Crestor; Take 1 tablet (10   mg) by mouth once daily at bedtime.       Test Results Pending At Discharge  Pending Labs       No current pending labs.            Hospital Course  Lukasz Rodrigues is a 69 y.o. year old male patient with PMHx of  CAD s/p stent ~'12, hypertension, hyperlipidemia, DM2 (A1C 7.9% 6/2024)  admitted on 11/29/2024 with complaint of mid sternal chest pain not relieved by nitroglycerine. Per patient, he had been having chest pain occasionally over the past 1-2 months that would occur with exertion. He followed with his cardiologist (Dr. Lacy) who gave him nitroglycerin. He had been taking the nitroglycerin as needed for the chest pain and it would resolve within minutes, however, he had chest pain early in the morning on 11/29 that was not relieved with 3 doses of nitroglycerin, so he called EMS. EKG showed T wave inversions in aVL, consistent with  prior EKG. Troponin mildly elevated at 23 and 38 on admission with repeat elevated to 1778. Concern is for unstable angina vs. NSTEMI. Nitroglycerin gtt started in the ED for management of chest pain and cardiology was consulted, patient admitted to ICU for continuation of nitroglycerin gtt. Was also loaded with aspirin and Brilinta and started on heparin gtt for management of possible NSTEMI. TTE showed LVEF of 32% and abnormal left ventricular wall motion. 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 Patient to be transferred to [facility] for cardiac MRI and evaluation for CABG.     Prior to transfer patient was on: home carvedilol, home rosuvastatin, nitroglycerin sublingual tabs - received 1x-  (drip was held after cath due to resolution of chest pain), sliding scale insulin 0-10 units, heparin gtt (this was held for heart cath and schedule to resume at 18:30 which is 2 hours after TR band was removed), aspirin 81 daily. Brilinta was discontinued by cardiology. Home losartan had been held in the setting of BRUNILDA.    Transferred to [facility] on 11/29 around 17:30 in stable condition.    Pertinent Physical Exam At Time of Discharge  Constitutional:       General: He is not in acute distress.     Appearance: He is obese. He is not diaphoretic.   HENT:      Head: Normocephalic and atraumatic.   Cardiovascular:      Rate and Rhythm: Normal rate and regular rhythm.      Heart sounds: No murmur heard.  Pulmonary:      Effort: Pulmonary effort is normal. No respiratory distress.      Breath sounds: Normal breath sounds. No stridor. No wheezing or rhonchi.   Abdominal:      Palpations: Abdomen is soft.      Tenderness: There is no abdominal tenderness.   Musculoskeletal:      Right lower leg: No edema.       Left lower leg: No edema.   Skin:     General: Skin is warm and dry.   Neurological:      General: No focal deficit present.      Mental Status: He is alert and oriented to person, place, and time.   Psychiatric:         Mood and Affect: Mood normal.         Behavior: Behavior normal.     Outpatient Follow-Up  Future Appointments   Date Time Provider Department Center   12/3/2024  1:00 PM CONC STRESS/ECHO LAB [id number] [facility]   12/18/2024 10:00 AM Tatianna Soto, APRN-CNP [id number] [facility]         Alanna Tarango MD  Internal Medicine, PGY-1

## 2024-11-29 NOTE — ED TRIAGE NOTES
Pt was laying down when chest pain began. Describes it as a pressure. Pt took 3 nitroglycerin and 3 aspirin. EMS administered 1 aspirin.

## 2024-11-29 NOTE — Clinical Note
Fluid total post procedure: 45 mL Suturegard Intro: Intraoperative tissue expansion was performed, utilizing the SUTUREGARD device, in order to reduce wound tension.

## 2024-11-29 NOTE — PROGRESS NOTES
Intensive Care Unit - Daily Progress Note      Subjective    Lukasz Rodrigues is a 69 y.o. year old male patient admitted on 11/29/2024 with following ICU needs: Angina 2/2 unstable angina vs NSTEMI requiring nitroglycerin gtt    Interval History:  - OVERNIGHT: No acute events    - Patient seen and examined at bedside this morning. He feels well overall. States that his chest pain and pressure is resolved at this time. Reiterates that he has been having on and off chest pain over the past month or so, generally with exertion, saw cardiology who gave him nitroglycerin tabs, has been using these when he experiences chest pain and has generally had quick relief with this. However, last night and early this morning, his chest pain occurred at rest and was not relieved by nitroglycerin tabs, so he called EMS. He does note that he had numbness/tinging in his R hand associated with the chest pain but denies nausea, vomiting, lightheadedness, and shortness of breath. He does also note some recent chest congestion and associated cough over the past month, but states that these symptoms have been improving.    Meds      HOME MEDS SCHEDULED MEDS PRN IV MEDS   Current Outpatient Medications   Medication Instructions    albuterol (Proventil HFA) 90 mcg/actuation inhaler 2 puffs, inhalation, Every 4 hours PRN    aspirin 81 mg, Daily    carvedilol (COREG) 3.125 mg, oral, 2 times daily    losartan (COZAAR) 25 mg, oral, Daily    nitroglycerin (NITROSTAT) 0.4 mg, sublingual, Every 5 min PRN, Call 911 if third dose is used or chest pain persists.    rosuvastatin (CRESTOR) 10 mg, oral, Nightly    [START ON 11/30/2024] aspirin, 81 mg, oral, Daily  [Held by provider] carvedilol, 3.125 mg, oral, BID  insulin lispro, 0-10 Units, subcutaneous, q4h  [Held by provider] losartan, 25 mg, oral, Daily  perflutren lipid microspheres, 0.5-10 mL of dilution, intravenous, Once in imaging  perflutren protein A microsphere, 0.5 mL, intravenous, Once in  "imaging  rosuvastatin, 10 mg, oral, Nightly  sulfur hexafluoride microsphr, 2 mL, intravenous, Once in imaging  [START ON 11/30/2024] ticagrelor, 90 mg, oral, BID     PRN medications: acetaminophen **OR** acetaminophen **OR** acetaminophen, albuterol, heparin, [Held by provider] nitroglycerin heparin, 0-4,500 Units/hr, Last Rate: 2,000 Units/hr (11/29/24 0624)  nitroglycerin in 5 % dextrose, 5-200 mcg/min, Last Rate: 20 mcg/min (11/29/24 0624)             Objective    /89   Pulse 80   Temp 36.5 °C (97.7 °F) (Temporal)   Resp 17   Ht 1.803 m (5' 11\")   Wt 115 kg (253 lb 8.5 oz)   SpO2 99%   BMI 35.36 kg/m²      Physical Exam  Constitutional:       General: He is not in acute distress.     Appearance: He is obese. He is not diaphoretic.   HENT:      Head: Normocephalic and atraumatic.   Cardiovascular:      Rate and Rhythm: Normal rate and regular rhythm.      Heart sounds: No murmur heard.  Pulmonary:      Effort: Pulmonary effort is normal. No respiratory distress.      Breath sounds: Normal breath sounds. No stridor. No wheezing or rhonchi.   Abdominal:      Palpations: Abdomen is soft.      Tenderness: There is no abdominal tenderness.   Musculoskeletal:      Right lower leg: No edema.      Left lower leg: No edema.   Skin:     General: Skin is warm and dry.   Neurological:      General: No focal deficit present.      Mental Status: He is alert and oriented to person, place, and time.   Psychiatric:         Mood and Affect: Mood normal.         Behavior: Behavior normal.            Intake/Output Summary (Last 24 hours) at 11/29/2024 0900  Last data filed at 11/29/2024 0714  Gross per 24 hour   Intake 1159.05 ml   Output 250 ml   Net 909.05 ml     Labs:     BNP  CBC   Results from last 72 hours   Lab Units 11/29/24  0257   SODIUM mmol/L 136   POTASSIUM mmol/L 4.4   CHLORIDE mmol/L 99   CO2 mmol/L 27   BUN mg/dL 29*   CREATININE mg/dL 1.77*   GLUCOSE mg/dL 201*   CALCIUM mg/dL 9.4   ANION GAP mmol/L 14 " "  EGFR mL/min/1.73m*2 42*     Results from last 72 hours   Lab Units 11/29/24  0826 11/29/24  0257   WBC AUTO x10*3/uL 9.2 10.6   HEMOGLOBIN g/dL 14.8 16.2   HEMATOCRIT % 44.0 48.1   PLATELETS AUTO x10*3/uL 203 252   NEUTROS PCT AUTO %  --  55.0   LYMPHS PCT AUTO %  --  32.0   MONOS PCT AUTO %  --  8.4   EOS PCT AUTO %  --  3.6            ABG VBG                Micro/ID:     No results found for: \"URINECULTURE\", \"BLOODCULT\", \"CSFCULTSMEAR\"    Summary of key imaging results from the last 24 hours  CXR 11/29:  - Hypoventilatory changes with bibasilar atelectasis. No consolidation.        Assessment and Plan     Assessment: Lukasz Rodrigues is a 69 y.o. year old male patient with PMHx of  CAD s/p stent ~'12, hypertension, hyperlipidemia, DM2 (A1C 7.9% 6/2024)  admitted on 11/29/2024 with complaint of mid sternal chest pain not relieved by nitroglycerine. EKG showed T wave inversions in aVL, consistent with prior EKG. Troponin mildly elevated at 23 and 38 on admission with repeat elevated to 1778. Concern is for unstable angina vs. NSTEMI. Nitroglycerin gtt started in the ED for management of chest pain and cardiology was consulted, patient admitted to ICU for continuation of nitroglycerin gtt.    Mechanical Ventilation: none  Sedation/Analgesia:  none  Restraints: no    Summary for 11/29/24  :  Cardiology to take patient for L heart cath  Resume home carvedilol  Continue to hold home Losartan in the setting of BRUNILDA    Plan:     NEUROLOGY/PSYCH:  AFSHAN    CARDIOVASCULAR:  #Unstable angina vs. NSTEMI  #Troponemia  #Hx of CAD s/p stent ( ~2012)  ::EKG with T wave inversions - consistent with prior EKGs  ::Heart score 7, BELIA score 6  ::Troponin up-trending 23 -> 36 -> 1778  ::BNP wnl at 72  -TTE pending   -Continue heparin gtt  -Continue ASA and Brilinta   -Continue nitroglycerin gtt   -Cardiology consulted, plan for L heart cath today    #HTN  ::Patient hypertensive on admission with SBP into 200s, now improved  -Continue " home Carvedilol 3.125 mg BID  -Hold home Losartan in the setting of BRUNILDA    #HLD  -Continue home rosuvastatin 10 mg daily    PULMONARY:  AFSHAN      GASTROENTEROLOGY:  AFSHAN      RENAL/GENITOURINARY:  #BRUNILDA on suspected CKD  -Cr 1.77 on admission (last Cr in EMR 1.23 9/2023)  -Will continue to monitor  -Avoid nephrotoxic agents and renally dose medications    ENDOCRINOLOGY:  #T2DM  ::Most recent A1c 7.9% 6/2024  -Previously on metformin and Farxiga but stopped taking  -SSI  -Continue to monitor POCT glucose    HEMATOLOGY:  AFSHAN    SKIN/MUSCULOSKELETAL:  AFSHAN    INFECTIOUS DISEASE:  AFSHAN    ANTIBIOTICS:  None      ICU Check List       ICU Liberation: Intervention:   Assess, Prevent, Manage Pain CPOT -    Both SAT and SBT [] SAT  [] SBT 30-60 min [] Extubate to NC  [] Extubate to NIV  [] Discuss Trach   Choice of analgesia and sedation RASS: 0  none    Delirium: Assess, prevent and manage CAM -    Early Mobility and Exercise  [] PT /OT consult   Family Engagement and Empowerment [x]Family updated []SW consult     FEN  Fluids: PRN  Electrolytes: PRN  Nutrition: NPO with sips for meds - will adjust to cardiac diet once cath performed  Prophylaxis:  DVT ppx: Heparin gtt  GI ppx: Pantoprazole  Bowel care:  None , BM:    Hardware:   PIVs    Social:  Code: Full Code    Contact: Daya Suazo (Daughter) [phone] (Mobile)   Disposition: Patient to remain in ICU while requiring nitroglycerin gtt, plan is for L heart cath today per cardiology.     Alanna Tarango MD   11/29/24 at 9:00 AM     Disclaimer: Documentation completed with the information available at the time of input. The times in the chart may not be reflective of actual patient care times, interventions, or procedures. Documentation occurs after the physical care of the patient.

## 2024-11-29 NOTE — ED PROVIDER NOTES
Emergency Department Provider Note        History of Present Illness     History provided by: Patient  Limitations to History: None  External Records Reviewed with Brief Summary: Outpatient progress note from 10/31 which showed patient saw cardiologist to establish care.  History of CAD, has stent from approximately 2012.  Also with history of hypertension, hyperlipidemia, diabetes.    HPI:  Lukasz Rodrigues is a 69 y.o. male with history of CAD, hypertension, hyperlipidemia, diabetes presenting emergency department chest pain.  Reports intermittent midsternal chest pain with exertion, normally resolves with rest.  Frequency and severity has been increased in the past few weeks after seeing cardiology on 10/31.  Reports compliance with medicines although did miss a dose yesterday.  Reports last night he started having chest pain at rest, did not resolve after 15 minutes and he took a nitro.  It continued to not resolved we took a total of 3 nitros, 3 81 mg aspirin tablets at home and called EMS.  Reports pressure sensation in the midsternal chest, nonradiating.  No pain in the back.  No sharp pain.  Endorses some tingling in the right upper extremity.  No weight gain, lower extremity swelling.  No shortness of breath, fever, cough.  With EMS he received an additional 81 mg aspirin for total of 324 mg of aspirin administered today.  Still having mild chest pain on arrival here.    Physical Exam   Triage vitals:  T 37 °C (98.6 °F)  HR 98  /89  RR 20  O2 100 % None (Room air)    General: Awake, alert, in no acute distress  Eyes: Gaze conjugate.  No scleral icterus or injection  HENT: Normo-cephalic, atraumatic. No stridor  CV: Regular rate, regular rhythm. Radial pulses 2+ bilaterally.  No murmurs noted.  No lower extremity edema bilaterally.  Resp: Breathing non-labored, speaking in full sentences.  Clear to auscultation bilaterally  GI: Soft, non-distended, non-tender. No rebound or  guarding.  MSK/Extremities: No gross bony deformities. Moving all extremities  Skin: Warm. Appropriate color  Neuro: Alert. Oriented. Face symmetric. Speech is fluent.  Gross strength and sensation intact in b/l UE and LEs  Psych: Appropriate mood and affect    Medical Decision Making & ED Course   Medical Decision Makin y.o. male presenting to the emergency department with midsternal chest pain, not relieved with rest or nitroglycerin.  History significant for CAD, hypertension, hyperlipidemia.  Mildly hypertensive on arrival here, but nontoxic-appearing.  Presentation concerning for potential ACS including unstable angina versus NSTEMI.  Initial EKG without obvious signs of ischemia, but he is having significant ectopy on EKG as well as on bedside monitor review.  Will trend troponins.  Will start nitroglycerin drip given his unrelieved chest pain.  He has already received aspirin.  Considering Brilinta, heparin load, but will wait on troponins.  Anticipate patient require admission given his highly concerning history, age, risk factors.  I have lower suspicion for additional diagnoses such as pneumonia, pneumothorax, pulmonary embolism, acute aortic dissection.  Acute PE and aortic dissection are less likely given the description of his pain, fairly benign physical examination.    Patient's initial troponin slightly elevated.  EKGs remain nonischemic and we continue to monitor in the ED.  Was uptitrated on nitro drip until he was chest pain-free.  Admitted to the ICU for continued observation of NSTEMI.  ----      Differential diagnoses considered include but are not limited to: As discussed above     Social Determinants of Health which Significantly Impact Care: None identified     EKG Independent Interpretation: EKG interpreted by myself. Please see ED Course for full interpretation.    Independent Result Review and Interpretation: Relevant laboratory and radiographic results were reviewed and  independently interpreted by myself.  As necessary, they are commented on in the ED Course.    Chronic conditions affecting the patient's care: As documented above in [facility]    The patient was discussed with the following consultants/services: Hospitalist/Admitting Provider who accepted the patient for admission    Care Considerations: As documented above in [facility]    ED Course:  ED Course as of 11/29/24 0417 Fri Nov 29, 2024   0307 ECG 12 lead  EKG obtained at 0256, demonstrates sinus rhythm, frequent PACs, occasional PVC.  Ventricular rate of 98, normal axis, normal intervals, no ST segment or T wave signs of ischemia. [SH]   0313 ECG 12 lead  Repeat EKG obtained at 0311, it demonstrates sinus tachycardia, ventricular rate of 111, normal axis, normal intervals.  There is poor R wave progression in the precordial leads compared to the prior EKG.  Still no ST segment elevations, depressions.  T wave inversion in 1 aVL.  Will continue to monitor. [SH]   0347 Troponin I Series, High Sensitivity (0, 1 HR)(!)  Troponin is elevated.  Will initiate heparin drip for NSTEMI given the patient's high risk story.  He is continuing to have chest pain, we are uptitrating nitro drip.  If pain is refractory to nitro drip, will call cardiology for potential urgent/emergent cath. [SH]   0347 ECG 12 lead  Repeat EKG obtained at 0342, it demonstrates normal sinus rhythm, ventricular rate 99, normal axis, continued frequent PVCs.  There are normal intervals.  There is still a T wave inversion in aVL.  R wave progression improved compared to last EKG.  No STEMI. [SH]   0402 Patient now chest pain-free.  Will discuss with hospitalist to admit for NSTEMI. [SH]   0416 Patient with return of his chest pain, repeat EKG at this time demonstrates continued sinus rhythm with high ectopic burden, ventricular rate 94, normal axis, normal intervals, T wave inversion aVL, no ST segment elevations or depressions.  Discussed patient with hospitalist.   They requested Brilinta be administered.  They will admit to the ICU on heparin, nitro drips. [SH]      ED Course User Index  [SH] Con Mason MD         Diagnoses as of 11/29/24 0417   NSTEMI (non-ST elevated myocardial infarction) (Multi)     Disposition   As a result of their workup, the patient will require admission to the hospital.  The patient was informed of his diagnosis.  The patient was given the opportunity to ask questions and I answered them. The patient agreed to be admitted to the hospital.    Procedures   Critical Care    Performed by: Con Mason MD  Authorized by: Con Mason MD    Critical care provider statement:     Critical care time (minutes):  35    Critical care time was exclusive of:  Separately billable procedures and treating other patients and teaching time    Critical care was necessary to treat or prevent imminent or life-threatening deterioration of the following conditions:  Cardiac failure    Critical care was time spent personally by me on the following activities:  Development of treatment plan with patient or surrogate, evaluation of patient's response to treatment, examination of patient, obtaining history from patient or surrogate, ordering and performing treatments and interventions, ordering and review of laboratory studies, ordering and review of radiographic studies, pulse oximetry, re-evaluation of patient's condition and review of old charts    Care discussed with: admitting provider            Con Mason MD  Emergency Medicine     Con Mason MD  11/29/24 0418

## 2024-11-30 ENCOUNTER — APPOINTMENT (OUTPATIENT)
Dept: CARDIOLOGY | Facility: HOSPITAL | Age: 69
End: 2024-11-30
Payer: MEDICARE

## 2024-11-30 LAB
ABO GROUP (TYPE) IN BLOOD: NORMAL
ALBUMIN SERPL BCP-MCNC: 3.6 G/DL (ref 3.4–5)
ALBUMIN SERPL BCP-MCNC: 3.7 G/DL (ref 3.4–5)
ANION GAP BLDA CALCULATED.4IONS-SCNC: 8 MMO/L (ref 10–25)
ANION GAP SERPL CALC-SCNC: 10 MMOL/L (ref 10–20)
ANION GAP SERPL CALC-SCNC: 14 MMOL/L (ref 10–20)
ANTIBODY SCREEN: NORMAL
APTT PPP: 31 SECONDS (ref 27–38)
BASE EXCESS BLDA CALC-SCNC: 1.8 MMOL/L (ref -2–3)
BASOPHILS # BLD AUTO: 0.03 X10*3/UL (ref 0–0.1)
BASOPHILS NFR BLD AUTO: 0.3 %
BODY TEMPERATURE: 37 DEGREES CELSIUS
BUN SERPL-MCNC: 25 MG/DL (ref 6–23)
BUN SERPL-MCNC: 26 MG/DL (ref 6–23)
CA-I BLDA-SCNC: 1.18 MMOL/L (ref 1.1–1.33)
CALCIUM SERPL-MCNC: 8.8 MG/DL (ref 8.6–10.6)
CALCIUM SERPL-MCNC: 9 MG/DL (ref 8.6–10.6)
CARDIAC TROPONIN I PNL SERPL HS: 3403 NG/L (ref 0–53)
CARDIAC TROPONIN I PNL SERPL HS: 7722 NG/L (ref 0–53)
CARDIAC TROPONIN I PNL SERPL HS: 8460 NG/L (ref 0–53)
CHLORIDE BLDA-SCNC: 102 MMOL/L (ref 98–107)
CHLORIDE SERPL-SCNC: 101 MMOL/L (ref 98–107)
CHLORIDE SERPL-SCNC: 105 MMOL/L (ref 98–107)
CO2 SERPL-SCNC: 21 MMOL/L (ref 21–32)
CO2 SERPL-SCNC: 28 MMOL/L (ref 21–32)
CREAT SERPL-MCNC: 1.39 MG/DL (ref 0.5–1.3)
CREAT SERPL-MCNC: 1.54 MG/DL (ref 0.5–1.3)
EGFRCR SERPLBLD CKD-EPI 2021: 49 ML/MIN/1.73M*2
EGFRCR SERPLBLD CKD-EPI 2021: 56 ML/MIN/1.73M*2
EOSINOPHIL # BLD AUTO: 0.28 X10*3/UL (ref 0–0.7)
EOSINOPHIL NFR BLD AUTO: 3.1 %
ERYTHROCYTE [DISTWIDTH] IN BLOOD BY AUTOMATED COUNT: 13.5 % (ref 11.5–14.5)
GLUCOSE BLD MANUAL STRIP-MCNC: 163 MG/DL (ref 74–99)
GLUCOSE BLD MANUAL STRIP-MCNC: 168 MG/DL (ref 74–99)
GLUCOSE BLD MANUAL STRIP-MCNC: 186 MG/DL (ref 74–99)
GLUCOSE BLD MANUAL STRIP-MCNC: 207 MG/DL (ref 74–99)
GLUCOSE BLDA-MCNC: 250 MG/DL (ref 74–99)
GLUCOSE SERPL-MCNC: 168 MG/DL (ref 74–99)
GLUCOSE SERPL-MCNC: 171 MG/DL (ref 74–99)
HCO3 BLDA-SCNC: 25.7 MMOL/L (ref 22–26)
HCT VFR BLD AUTO: 41.7 % (ref 41–52)
HCT VFR BLD EST: 41 % (ref 41–52)
HGB BLD-MCNC: 14 G/DL (ref 13.5–17.5)
HGB BLDA-MCNC: 13.6 G/DL (ref 13.5–17.5)
IMM GRANULOCYTES # BLD AUTO: 0.03 X10*3/UL (ref 0–0.7)
IMM GRANULOCYTES NFR BLD AUTO: 0.3 % (ref 0–0.9)
INHALED O2 CONCENTRATION: 28 %
INR PPP: 1 (ref 0.9–1.1)
LACTATE BLDA-SCNC: 1.4 MMOL/L (ref 0.4–2)
LYMPHOCYTES # BLD AUTO: 2.45 X10*3/UL (ref 1.2–4.8)
LYMPHOCYTES NFR BLD AUTO: 27 %
MAGNESIUM SERPL-MCNC: 1.95 MG/DL (ref 1.6–2.4)
MAGNESIUM SERPL-MCNC: 2.29 MG/DL (ref 1.6–2.4)
MCH RBC QN AUTO: 29 PG (ref 26–34)
MCHC RBC AUTO-ENTMCNC: 33.6 G/DL (ref 32–36)
MCV RBC AUTO: 86 FL (ref 80–100)
MONOCYTES # BLD AUTO: 0.82 X10*3/UL (ref 0.1–1)
MONOCYTES NFR BLD AUTO: 9 %
NEUTROPHILS # BLD AUTO: 5.46 X10*3/UL (ref 1.2–7.7)
NEUTROPHILS NFR BLD AUTO: 60.3 %
NRBC BLD-RTO: 0 /100 WBCS (ref 0–0)
OXYHGB MFR BLDA: 94.7 % (ref 94–98)
PCO2 BLDA: 37 MM HG (ref 38–42)
PH BLDA: 7.45 PH (ref 7.38–7.42)
PHOSPHATE SERPL-MCNC: 4.2 MG/DL (ref 2.5–4.9)
PHOSPHATE SERPL-MCNC: 4.3 MG/DL (ref 2.5–4.9)
PLATELET # BLD AUTO: 203 X10*3/UL (ref 150–450)
PO2 BLDA: 77 MM HG (ref 85–95)
POTASSIUM BLDA-SCNC: 4.8 MMOL/L (ref 3.5–5.3)
POTASSIUM SERPL-SCNC: 4.3 MMOL/L (ref 3.5–5.3)
POTASSIUM SERPL-SCNC: 4.9 MMOL/L (ref 3.5–5.3)
PROTHROMBIN TIME: 11.5 SECONDS (ref 9.8–12.8)
RBC # BLD AUTO: 4.83 X10*6/UL (ref 4.5–5.9)
RH FACTOR (ANTIGEN D): NORMAL
SAO2 % BLDA: 97 % (ref 94–100)
SODIUM BLDA-SCNC: 131 MMOL/L (ref 136–145)
SODIUM SERPL-SCNC: 134 MMOL/L (ref 136–145)
SODIUM SERPL-SCNC: 136 MMOL/L (ref 136–145)
UFH PPP CHRO-ACNC: 0.2 IU/ML
UFH PPP CHRO-ACNC: 0.2 IU/ML
UFH PPP CHRO-ACNC: 0.3 IU/ML
UFH PPP CHRO-ACNC: 0.4 IU/ML
UFH PPP CHRO-ACNC: <0.1 IU/ML
WBC # BLD AUTO: 9.1 X10*3/UL (ref 4.4–11.3)

## 2024-11-30 PROCEDURE — 85520 HEPARIN ASSAY: CPT

## 2024-11-30 PROCEDURE — 85025 COMPLETE CBC W/AUTO DIFF WBC: CPT

## 2024-11-30 PROCEDURE — 2500000001 HC RX 250 WO HCPCS SELF ADMINISTERED DRUGS (ALT 637 FOR MEDICARE OP)

## 2024-11-30 PROCEDURE — 93010 ELECTROCARDIOGRAM REPORT: CPT | Performed by: INTERNAL MEDICINE

## 2024-11-30 PROCEDURE — C1769 GUIDE WIRE: HCPCS | Performed by: INTERNAL MEDICINE

## 2024-11-30 PROCEDURE — 83735 ASSAY OF MAGNESIUM: CPT

## 2024-11-30 PROCEDURE — 33967 INSERT I-AORT PERCUT DEVICE: CPT | Performed by: INTERNAL MEDICINE

## 2024-11-30 PROCEDURE — 82947 ASSAY GLUCOSE BLOOD QUANT: CPT

## 2024-11-30 PROCEDURE — 2720000007 HC OR 272 NO HCPCS: Performed by: INTERNAL MEDICINE

## 2024-11-30 PROCEDURE — 93005 ELECTROCARDIOGRAM TRACING: CPT

## 2024-11-30 PROCEDURE — 99152 MOD SED SAME PHYS/QHP 5/>YRS: CPT | Performed by: INTERNAL MEDICINE

## 2024-11-30 PROCEDURE — 36415 COLL VENOUS BLD VENIPUNCTURE: CPT

## 2024-11-30 PROCEDURE — 2500000004 HC RX 250 GENERAL PHARMACY W/ HCPCS (ALT 636 FOR OP/ED)

## 2024-11-30 PROCEDURE — 75710 ARTERY X-RAYS ARM/LEG: CPT | Mod: 59 | Performed by: INTERNAL MEDICINE

## 2024-11-30 PROCEDURE — 84484 ASSAY OF TROPONIN QUANT: CPT | Performed by: STUDENT IN AN ORGANIZED HEALTH CARE EDUCATION/TRAINING PROGRAM

## 2024-11-30 PROCEDURE — 2500000002 HC RX 250 W HCPCS SELF ADMINISTERED DRUGS (ALT 637 FOR MEDICARE OP, ALT 636 FOR OP/ED)

## 2024-11-30 PROCEDURE — 80069 RENAL FUNCTION PANEL: CPT

## 2024-11-30 PROCEDURE — 85347 COAGULATION TIME ACTIVATED: CPT

## 2024-11-30 PROCEDURE — 5A02210 ASSISTANCE WITH CARDIAC OUTPUT USING BALLOON PUMP, CONTINUOUS: ICD-10-PCS | Performed by: INTERNAL MEDICINE

## 2024-11-30 PROCEDURE — 84484 ASSAY OF TROPONIN QUANT: CPT

## 2024-11-30 PROCEDURE — 37799 UNLISTED PX VASCULAR SURGERY: CPT

## 2024-11-30 PROCEDURE — 2550000001 HC RX 255 CONTRASTS: Performed by: INTERNAL MEDICINE

## 2024-11-30 PROCEDURE — 2500000004 HC RX 250 GENERAL PHARMACY W/ HCPCS (ALT 636 FOR OP/ED): Performed by: INTERNAL MEDICINE

## 2024-11-30 PROCEDURE — 2780000003 HC OR 278 NO HCPCS: Performed by: INTERNAL MEDICINE

## 2024-11-30 PROCEDURE — 1100000001 HC PRIVATE ROOM DAILY

## 2024-11-30 PROCEDURE — 99153 MOD SED SAME PHYS/QHP EA: CPT | Performed by: INTERNAL MEDICINE

## 2024-11-30 PROCEDURE — 85347 COAGULATION TIME ACTIVATED: CPT | Performed by: INTERNAL MEDICINE

## 2024-11-30 PROCEDURE — C1725 CATH, TRANSLUMIN NON-LASER: HCPCS | Performed by: INTERNAL MEDICINE

## 2024-11-30 PROCEDURE — 99223 1ST HOSP IP/OBS HIGH 75: CPT

## 2024-11-30 PROCEDURE — 84132 ASSAY OF SERUM POTASSIUM: CPT | Performed by: STUDENT IN AN ORGANIZED HEALTH CARE EDUCATION/TRAINING PROGRAM

## 2024-11-30 PROCEDURE — 36620 INSERTION CATHETER ARTERY: CPT | Performed by: STUDENT IN AN ORGANIZED HEALTH CARE EDUCATION/TRAINING PROGRAM

## 2024-11-30 PROCEDURE — C1894 INTRO/SHEATH, NON-LASER: HCPCS | Performed by: INTERNAL MEDICINE

## 2024-11-30 RX ORDER — NITROGLYCERIN 20 MG/100ML
0-200 INJECTION INTRAVENOUS CONTINUOUS
Status: DISCONTINUED | OUTPATIENT
Start: 2024-11-30 | End: 2024-12-03

## 2024-11-30 RX ORDER — HYDRALAZINE HYDROCHLORIDE 10 MG/1
10 TABLET, FILM COATED ORAL 3 TIMES DAILY
Status: DISCONTINUED | OUTPATIENT
Start: 2024-11-30 | End: 2024-12-01

## 2024-11-30 RX ORDER — METOPROLOL TARTRATE 25 MG/1
12.5 TABLET, FILM COATED ORAL 2 TIMES DAILY
Status: DISCONTINUED | OUTPATIENT
Start: 2024-11-30 | End: 2024-11-30

## 2024-11-30 RX ORDER — ONDANSETRON HYDROCHLORIDE 2 MG/ML
INJECTION, SOLUTION INTRAVENOUS
Status: COMPLETED
Start: 2024-11-30 | End: 2024-11-30

## 2024-11-30 RX ORDER — OXYCODONE HYDROCHLORIDE 5 MG/1
5 TABLET ORAL EVERY 6 HOURS PRN
Status: DISCONTINUED | OUTPATIENT
Start: 2024-11-30 | End: 2024-12-06 | Stop reason: HOSPADM

## 2024-11-30 RX ORDER — HYDROXYZINE HYDROCHLORIDE 25 MG/1
25 TABLET, FILM COATED ORAL ONCE
Status: COMPLETED | OUTPATIENT
Start: 2024-11-30 | End: 2024-11-30

## 2024-11-30 RX ORDER — METOPROLOL TARTRATE 25 MG/1
12.5 TABLET, FILM COATED ORAL EVERY 6 HOURS
Status: DISCONTINUED | OUTPATIENT
Start: 2024-11-30 | End: 2024-11-30

## 2024-11-30 RX ORDER — ONDANSETRON HYDROCHLORIDE 2 MG/ML
4 INJECTION, SOLUTION INTRAVENOUS ONCE
Status: COMPLETED | OUTPATIENT
Start: 2024-11-30 | End: 2024-11-30

## 2024-11-30 RX ORDER — FENTANYL CITRATE 50 UG/ML
INJECTION, SOLUTION INTRAMUSCULAR; INTRAVENOUS AS NEEDED
Status: DISCONTINUED | OUTPATIENT
Start: 2024-11-30 | End: 2024-11-30 | Stop reason: HOSPADM

## 2024-11-30 RX ORDER — MAGNESIUM SULFATE HEPTAHYDRATE 40 MG/ML
2 INJECTION, SOLUTION INTRAVENOUS ONCE
Status: COMPLETED | OUTPATIENT
Start: 2024-11-30 | End: 2024-11-30

## 2024-11-30 RX ORDER — CEFAZOLIN SODIUM 2 G/50ML
SOLUTION INTRAVENOUS CONTINUOUS PRN
Status: COMPLETED | OUTPATIENT
Start: 2024-11-30 | End: 2024-11-30

## 2024-11-30 RX ORDER — HYDRALAZINE HYDROCHLORIDE 25 MG/1
25 TABLET, FILM COATED ORAL ONCE
Status: COMPLETED | OUTPATIENT
Start: 2024-11-30 | End: 2024-11-30

## 2024-11-30 RX ORDER — METOPROLOL TARTRATE 25 MG/1
25 TABLET, FILM COATED ORAL EVERY 6 HOURS
Status: DISCONTINUED | OUTPATIENT
Start: 2024-11-30 | End: 2024-12-01

## 2024-11-30 RX ORDER — HEPARIN SODIUM 1000 [USP'U]/ML
INJECTION, SOLUTION INTRAVENOUS; SUBCUTANEOUS AS NEEDED
Status: DISCONTINUED | OUTPATIENT
Start: 2024-11-30 | End: 2024-11-30 | Stop reason: HOSPADM

## 2024-11-30 RX ORDER — VANCOMYCIN HYDROCHLORIDE 1 G/20ML
INJECTION, POWDER, LYOPHILIZED, FOR SOLUTION INTRAVENOUS AS NEEDED
Status: DISCONTINUED | OUTPATIENT
Start: 2024-11-30 | End: 2024-11-30 | Stop reason: HOSPADM

## 2024-11-30 RX ORDER — LIDOCAINE HYDROCHLORIDE 20 MG/ML
INJECTION, SOLUTION INFILTRATION; PERINEURAL AS NEEDED
Status: DISCONTINUED | OUTPATIENT
Start: 2024-11-30 | End: 2024-11-30 | Stop reason: HOSPADM

## 2024-11-30 RX ORDER — MIDAZOLAM HYDROCHLORIDE 1 MG/ML
INJECTION, SOLUTION INTRAMUSCULAR; INTRAVENOUS AS NEEDED
Status: DISCONTINUED | OUTPATIENT
Start: 2024-11-30 | End: 2024-11-30 | Stop reason: HOSPADM

## 2024-11-30 RX ORDER — NITROGLYCERIN 20 MG/100ML
5-200 INJECTION INTRAVENOUS CONTINUOUS
Status: DISCONTINUED | OUTPATIENT
Start: 2024-11-30 | End: 2024-11-30

## 2024-11-30 RX ORDER — HEPARIN SODIUM 10000 [USP'U]/100ML
0-4000 INJECTION, SOLUTION INTRAVENOUS CONTINUOUS
Status: DISCONTINUED | OUTPATIENT
Start: 2024-12-01 | End: 2024-12-03

## 2024-11-30 ASSESSMENT — PAIN - FUNCTIONAL ASSESSMENT
PAIN_FUNCTIONAL_ASSESSMENT: 0-10

## 2024-11-30 ASSESSMENT — PAIN SCALES - GENERAL
PAINLEVEL_OUTOF10: 0 - NO PAIN
PAINLEVEL_OUTOF10: 0 - NO PAIN
PAINLEVEL_OUTOF10: 4
PAINLEVEL_OUTOF10: 0 - NO PAIN
PAINLEVEL_OUTOF10: 5 - MODERATE PAIN
PAINLEVEL_OUTOF10: 0 - NO PAIN

## 2024-11-30 NOTE — CARE PLAN
Problem: Pain - Adult  Goal: Verbalizes/displays adequate comfort level or baseline comfort level  Outcome: Progressing  Flowsheets (Taken 11/30/2024 0087)  Verbalizes/displays adequate comfort level or baseline comfort level:   Administer analgesics based on type and severity of pain and evaluate response   Encourage patient to monitor pain and request assistance   Assess pain using appropriate pain scale     Problem: Safety - Adult  Goal: Free from fall injury  Outcome: Progressing     Problem: Chronic Conditions and Co-morbidities  Goal: Patient's chronic conditions and co-morbidity symptoms are monitored and maintained or improved  Outcome: Progressing

## 2024-11-30 NOTE — H&P
History Of Present Illness  Lukasz Rodriuges is a 69 y.o. year old male patient with PMHx of CAD s/p stent 20 years ago, hypertension, hyperlipidemia, DM2 (A1C 7.9% 6/2024) admitted to the OSH 11/29/2024 with complaint of mid sternal chest pain not relieved by nitroglycerine.     Per patient, he had been having chest pain occasionally over the past 1-2 months that would occur with exertion. He followed with his cardiologist (Dr. Lacy) who gave him nitroglycerin. He had been taking the nitroglycerin as needed for the chest pain and it would resolve within minutes, however, he had chest pain early in the morning on 11/29 that was not relieved with 3 doses of nitroglycerin, so he called EMS. EKG showed T wave inversions in aVL, consistent with prior EKG. Troponin mildly elevated at 23 and 38 on admission with repeat elevated to 1778. Concern is for unstable angina vs. NSTEMI. Nitroglycerin gtt started in the OSH ED for management of chest pain and cardiology was consulted, patient admitted to ICU for continuation of nitroglycerin gtt. Was also loaded with aspirin and Brilinta and started on heparin gtt for management of possible NSTEMI. TTE showed LVEF of 32% and abnormal left ventricular wall motion. 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 Patient transferred to [facility] for further evaluation     ECHO:   1. Apical septal segment, apical lateral segment, apical anterior segment, and apex are abnormal.   2. The left ventricular systolic function is moderately decreased, with a Mccartney's biplane calculated ejection fraction of 32%.   3. Abnormal left venticular wall motion.   4. Spectral Doppler shows a Grade I (impaired relaxation pattern) of left ventricular diastolic filling with normal  left atrial filling pressure.   5. Left ventricular cavity size is moderately dilated.   6. However, LV cavity size is normal, when indexed to body surface area.   7. There is normal right ventricular global systolic function.     Cardiac Cath:   1. Ischemic cardiomyopathy.   2. Severe, focal stenosis of the mid and distal left anterior descending artery, mid right coronary artery, and chronically totally occluded proximal left circumflex with left to left collaterals.   3. Guideline directed medical therapy for ACS and heart failure.   4. Will transfer to [facility] for evaluation for bypass surgery.       The right coronary artery is a normal caliber vessel. The RCA arises normally from the right sinus of Valsalva. The RCA showed severe mid-segment obstruction. The mid right coronary artery showed 80% stenosis.     Coronary Lesion Summary:  Vessel       Stenosis    Vessel Segment  LAD        80% stenosis       mid  LAD        80% stenosis      distal  Circumflex 100% stenosis    proximal  RCA        80% stenosis       mid     Past Medical History  He has a past medical history of Abscess (11/12/2018), Foot pain, bilateral (11/12/2018), and Other fatigue (11/12/2018).    Surgical History  He has no past surgical history on file.     Social History  He reports that he has never smoked. He has never used smokeless tobacco. He reports that he does not drink alcohol and does not use drugs.    Family History  No family history on file.     Allergies  Patient has no known allergies.    Review of Systems   Per HPI     Physical Exam  Constitutional: Patient does not appear to be in any acute distress, AOx4  HEENT: NCAT, normal external inspection of ears and nose. Oropharynx normal.  Cardio: RRR, S1/S2, no murmurs, rubs, or gallops, radial pulses +2, no edema of extremities  Pulm: CTAB, no respiratory distress.  GI: +BS, soft, non-tender, nondistended, no guarding or rebound, no masses noted  MSK: No joint  swelling, normal movements of all extremities. Normal ROM, 5/5 strength  Skin: No lesions, contusions, or erythema.  Extremities: no BLE swelling   Psych: Appropriate mood and behavior    Last Recorded Vitals  /86 (BP Location: Right arm, Patient Position: Lying)   Pulse 81   Temp 36 °C (96.8 °F) (Temporal)   Resp 15   Wt 112 kg (246 lb)   SpO2 97%     Relevant Results  Results for orders placed or performed during the hospital encounter of 11/29/24 (from the past 24 hours)   CBC and Auto Differential   Result Value Ref Range    WBC 10.6 4.4 - 11.3 x10*3/uL    nRBC 0.0 0.0 - 0.0 /100 WBCs    RBC 5.59 4.50 - 5.90 x10*6/uL    Hemoglobin 16.2 13.5 - 17.5 g/dL    Hematocrit 48.1 41.0 - 52.0 %    MCV 86 80 - 100 fL    MCH 29.0 26.0 - 34.0 pg    MCHC 33.7 32.0 - 36.0 g/dL    RDW 13.3 11.5 - 14.5 %    Platelets 252 150 - 450 x10*3/uL    Neutrophils % 55.0 40.0 - 80.0 %    Immature Granulocytes %, Automated 0.6 0.0 - 0.9 %    Lymphocytes % 32.0 13.0 - 44.0 %    Monocytes % 8.4 2.0 - 10.0 %    Eosinophils % 3.6 0.0 - 6.0 %    Basophils % 0.4 0.0 - 2.0 %    Neutrophils Absolute 5.85 1.20 - 7.70 x10*3/uL    Immature Granulocytes Absolute, Automated 0.06 0.00 - 0.70 x10*3/uL    Lymphocytes Absolute 3.39 1.20 - 4.80 x10*3/uL    Monocytes Absolute 0.89 0.10 - 1.00 x10*3/uL    Eosinophils Absolute 0.38 0.00 - 0.70 x10*3/uL    Basophils Absolute 0.04 0.00 - 0.10 x10*3/uL   Comprehensive Metabolic Panel   Result Value Ref Range    Glucose 201 (H) 74 - 99 mg/dL    Sodium 136 136 - 145 mmol/L    Potassium 4.4 3.5 - 5.3 mmol/L    Chloride 99 98 - 107 mmol/L    Bicarbonate 27 21 - 32 mmol/L    Anion Gap 14 10 - 20 mmol/L    Urea Nitrogen 29 (H) 6 - 23 mg/dL    Creatinine 1.77 (H) 0.50 - 1.30 mg/dL    eGFR 42 (L) >60 mL/min/1.73m*2    Calcium 9.4 8.6 - 10.3 mg/dL    Albumin 4.4 3.4 - 5.0 g/dL    Alkaline Phosphatase 83 33 - 136 U/L    Total Protein 7.6 6.4 - 8.2 g/dL    AST 19 9 - 39 U/L    Bilirubin, Total 0.4 0.0 - 1.2 mg/dL     ALT 26 10 - 52 U/L   Magnesium   Result Value Ref Range    Magnesium 1.80 1.60 - 2.40 mg/dL   Troponin I, High Sensitivity, Initial   Result Value Ref Range    Troponin I, High Sensitivity 23 (H) 0 - 20 ng/L   B-type natriuretic peptide   Result Value Ref Range    BNP 72 0 - 99 pg/mL   Hemoglobin A1c   Result Value Ref Range    Hemoglobin A1C 7.4 (H) See comment %    Estimated Average Glucose 166 Not Established mg/dL   Protime-INR   Result Value Ref Range    Protime 11.0 9.8 - 12.8 seconds    INR 1.0 0.9 - 1.1   Troponin, High Sensitivity, 1 Hour   Result Value Ref Range    Troponin I, High Sensitivity 38 (H) 0 - 20 ng/L   Lipid panel   Result Value Ref Range    Cholesterol 121 0 - 199 mg/dL    HDL-Cholesterol 30.8 mg/dL    Cholesterol/HDL Ratio 3.9     LDL Calculated 63 <=99 mg/dL    VLDL 27 0 - 40 mg/dL    Triglycerides 134 0 - 149 mg/dL    Non HDL Cholesterol 90 0 - 149 mg/dL   TSH with reflex to Free T4 if abnormal   Result Value Ref Range    Thyroid Stimulating Hormone 2.18 0.44 - 3.98 mIU/L   POCT GLUCOSE   Result Value Ref Range    POCT Glucose 193 (H) 74 - 99 mg/dL   Transthoracic Echo (TTE) Complete   Result Value Ref Range    AV pk catalina 1.13 m/s    AV mn grad 3 mmHg    LVOT diam 2.44 cm    MV E/A ratio 0.54     LA vol index A/L 19.2 ml/m2    Tricuspid annular plane systolic excursion 2.3 cm    LV EF 32 %    RV free wall pk S' 9.00 cm/s    LVIDd 6.41 cm    RVSP 4.9 mmHg    Aortic Valve Area by Continuity of VTI 3.11 cm2    Aortic Valve Area by Continuity of Peak Velocity 3.56 cm2    AV pk grad 5 mmHg    LV A4C EF 27.4    CBC   Result Value Ref Range    WBC 9.2 4.4 - 11.3 x10*3/uL    nRBC 0.0 0.0 - 0.0 /100 WBCs    RBC 5.16 4.50 - 5.90 x10*6/uL    Hemoglobin 14.8 13.5 - 17.5 g/dL    Hematocrit 44.0 41.0 - 52.0 %    MCV 85 80 - 100 fL    MCH 28.7 26.0 - 34.0 pg    MCHC 33.6 32.0 - 36.0 g/dL    RDW 13.2 11.5 - 14.5 %    Platelets 203 150 - 450 x10*3/uL   Protime-INR   Result Value Ref Range    Protime 11.6  9.8 - 12.8 seconds    INR 1.0 0.9 - 1.1   Troponin I, High Sensitivity   Result Value Ref Range    Troponin I, High Sensitivity 1,778 (HH) 0 - 20 ng/L   APTT   Result Value Ref Range    aPTT 193 (HH) 27 - 38 seconds   Heparin Assay   Result Value Ref Range    Heparin Unfractionated 0.7 See Comment Below for Therapeutic Ranges IU/mL   POCT GLUCOSE   Result Value Ref Range    POCT Glucose 140 (H) 74 - 99 mg/dL   Renal function panel   Result Value Ref Range    Glucose 146 (H) 74 - 99 mg/dL    Sodium 136 136 - 145 mmol/L    Potassium 4.0 3.5 - 5.3 mmol/L    Chloride 105 98 - 107 mmol/L    Bicarbonate 22 21 - 32 mmol/L    Anion Gap 13 10 - 20 mmol/L    Urea Nitrogen 27 (H) 6 - 23 mg/dL    Creatinine 1.36 (H) 0.50 - 1.30 mg/dL    eGFR 57 (L) >60 mL/min/1.73m*2    Calcium 8.6 8.6 - 10.3 mg/dL    Phosphorus 2.8 2.5 - 4.9 mg/dL    Albumin 3.8 3.4 - 5.0 g/dL   Magnesium   Result Value Ref Range    Magnesium 1.63 1.60 - 2.40 mg/dL   Heparin Assay, UFH   Result Value Ref Range    Heparin Unfractionated 0.6 See Comment Below for Therapeutic Ranges IU/mL   POCT GLUCOSE   Result Value Ref Range    POCT Glucose 166 (H) 74 - 99 mg/dL        Cardiac Catheterization Procedure    Result Date: 11/29/2024   [facility], Cath Lab, [address] [place] [address] Cardiovascular Catheterization Report Patient Name:      YEVGENIY LEUNG      Performing Physician:  Alicia Nur MD Study Date:        11/29/2024          Verifying Physician:   Alicia Nur MD MRN/PID:           [id number]            Cardiologist/Co-Scrub: Accession#:        [id number]        Ordering Provider:     [id number] LAM ANDRADE Date of Birth/Age: 1955 / 69 years Cardiologist: Gender:            M                   Fellow: Encounter#:         [id number]          Surgeon:  Study: Coronary Arteriogram  Indications: YEVGENIY LEUNG is a 69 year old male who presents with dyslipidemia, hypertension, coronary artery disease, prior myocardial infarction, prior percutaneous coronary intervention and a chest pain assessment of typical angina. NSTE - ACS. Medical History: Stress test performed: No. CTA performed: NoSteven Mulligan accessed: No. LVEF Assessed: No. Cardiac arrest: No. Cardiac surgical consult: Completed - cardiac surgery recommended. Cardiovascular instability: No. Frailty status of patient entering lab: 5 = Mildly frail.  Coronary Angiography: The coronary circulation is right dominant.  Left Main Coronary Artery: The left main coronary artery is a normal caliber vessel. The left main arises normally from the left coronary sinus of Valsalva and bifurcates into the LAD and circumflex coronary arteries. The left main coronary artery showed no significant disease or stenosis greater than 30%.  Left Anterior Descending Coronary Artery Distribution: The left anterior descending coronary artery is a normal caliber vessel. The LAD arises normally from the left main coronary artery. The LAD demonstrated severe mid-segment obstruction. The mid left anterior descending coronary artery showed 80% stenosis. An additional lesion, located at the distal left anterior descending coronary artery, revealed 80% stenosis.  Circumflex Coronary Artery Distribution: The circumflex coronary artery is a normal caliber vessel. The circumflex arises normally from the left main coronary artery and terminates in the AV groove. The circumflex revealed total occlusion originating at the proximal segment. The proximal circumflex coronary artery showed 100% stenosis. The first obtuse marginal branch and continuation of the left circumflex fills via left to left collaterals.  Right Coronary Artery Distribution: The right coronary artery is a normal caliber vessel. The RCA arises  normally from the right sinus of Valsalva. The RCA showed severe mid-segment obstruction. The mid right coronary artery showed 80% stenosis.  Coronary Lesion Summary: Vessel       Stenosis    Vessel Segment LAD        80% stenosis       mid LAD        80% stenosis      distal Circumflex 100% stenosis    proximal RCA        80% stenosis       mid  Hemo Personnel: +----------------+---------+ Name            Duty      +----------------+---------+ Alli Nur MDPROC MD 1 +----------------+---------+  +----------+ Contrast:  +----------+ Omnipaque: +----------+  Hemodynamic Pressures:  +----+---------------------+----------+-------------+--------------+---------+ Site      Date Time      Phase NameSystolic mmHgDiastolic mmHgMean mmHg +----+---------------------+----------+-------------+--------------+---------+   AO11/29/2024 1:05:53 PM  AIR REST          153            89      113 +----+---------------------+----------+-------------+--------------+---------+  Complications: No in-lab complications observed.  Cardiac Cath Post Procedure Notes: Post Procedure Diagnosis: NSTEMI. Blood Loss:               Estimated blood loss during the procedure was 10 mls. Specimens Removed:        Number of specimen(s) removed: none. ____________________________________________________________________________________ CONCLUSIONS:  1. Ischemic cardiomyopathy.  2. Severe, focal stenosis of the mid and distal left anterior descending artery, mid right coronary artery, and chronically totally occluded proximal left circumflex with left to left collaterals.  3. Guideline directed medical therapy for ACS and heart failure.  4. Will transfer to [facility] for evaluation for bypass surgery. ICD 10 Codes: Non ST elevation (NSTEMI) myocardial infarction-I21.4  CPT Codes: Coronary Angiography S&I only ([facility])([facility])-51310  [id number] Alli Nur MD Performing Physician Electronically signed by [id number] Alli Nur  MD on 11/29/2024 at 3:51:51 PM  ** Final **     Transthoracic Echo (TTE) Complete    Result Date: 11/29/2024   [facility], [address] [place] [address]               Tel [phone] and Fax [phone] TRANSTHORACIC ECHOCARDIOGRAM REPORT  Patient Name:       YEVGENIY LEUNG      [place] Physician:    [id number] Nur MD Study Date:         11/29/2024          Ordering Provider:    [id number] GERRY DIAZ MRN/PID:            [id number]            Fellow: Accession#:         [id number]        Nurse:                Melanie Painter RN Date of Birth/Age:  1955 / 69 years Sonographer:          Kylee Martin RDCS Gender assigned at                     Additional Staff:     Lisette Maxwell RN Birth: Height:             180.34 cm           Admit Date:           11/29/2024 Weight:             114.76 kg           Admission Status:     Inpatient -                                                               Routine BSA / BMI:          2.33 m2 / 35.29     Encounter#:           [id number]                     kg/m2 Blood Pressure:     123/86 mmHg         Department Location:  [facility] Non                                                               Invasive Study Type:    TRANSTHORACIC ECHO (TTE) COMPLETE Diagnosis/ICD: Non ST elevation (NSTEMI) myocardial infarction-I21.4 Indication:    NSTEMI CPT Code:      Echo Complete w Full Doppler-62200 Patient History: Pertinent History: CAD, Chest Pain and DM, Stent. Study Detail: The following Echo studies were performed: 2D, M-Mode, Doppler and               color flow. Technically challenging study due to body habitus.               Definity used as a contrast agent for endocardial border                definition. The patient was awake.  PHYSICIAN INTERPRETATION: Left Ventricle: The left ventricular systolic function is moderately decreased, with a Mccartney's biplane calculated ejection fraction of 32%. Left venticular wall motion is abnormal. The left ventricular cavity size is moderately dilated. There is normal septal and normal posterior left ventricular wall thickness. Spectral Doppler shows a Grade I (impaired relaxation pattern) of left ventricular diastolic filling with normal left atrial filling pressure. However, LV cavity size is normal, when indexed to body surface area. LV Wall Scoring: The apical septal segment, apical lateral segment, apical anterior segment, and apex are akinetic. All remaining scored segments are normal. Left Atrium: The left atrium is normal in size. Right Ventricle: The right ventricle is normal in size. There is normal right ventricular global systolic function. Right Atrium: The right atrium is normal in size. Aortic Valve: The aortic valve is trileaflet. The aortic valve dimensionless index is 0.66. There is no evidence of aortic valve regurgitation. The peak instantaneous gradient of the aortic valve is 5 mmHg. The mean gradient of the aortic valve is 3 mmHg. Mitral Valve: The mitral valve is normal in structure. There is no evidence of mitral valve regurgitation. Tricuspid Valve: The tricuspid valve is structurally normal. No evidence of tricuspid regurgitation. Pulmonic Valve: The pulmonic valve is not well visualized. The pulmonic valve regurgitation was not well visualized. Pericardium: There is no pericardial effusion noted. Aorta: The aortic root was not well visualized. In comparison to the previous echocardiogram(s): There are no prior studies on this patient for comparison purposes.  CONCLUSIONS:  1. Apical septal segment, apical lateral segment, apical anterior segment, and apex are abnormal.  2. The left ventricular systolic function is moderately decreased, with a  Mccartney's biplane calculated ejection fraction of 32%.  3. Abnormal left venticular wall motion.  4. Spectral Doppler shows a Grade I (impaired relaxation pattern) of left ventricular diastolic filling with normal left atrial filling pressure.  5. Left ventricular cavity size is moderately dilated.  6. However, LV cavity size is normal, when indexed to body surface area.  7. There is normal right ventricular global systolic function. QUANTITATIVE DATA SUMMARY:  2D MEASUREMENTS:          Normal Ranges: IVSd:            0.91 cm  (0.6-1.1cm) LVPWd:           0.95 cm  (0.6-1.1cm) LVIDd:           6.41 cm  (3.9-5.9cm) LVIDs:           4.34 cm LV Mass Index:   108 g/m2 LVEDV Index:     72 ml/m2 LV % FS          32.3 %  LA VOLUME:                    Normal Ranges: LA Vol A4C:        27.8 ml    (22+/-6mL/m2) LA Vol A2C:        60.8 ml LA Vol BP:         44.7 ml LA Vol Index A4C:  11.9 ml/m2 LA Vol Index A2C:  26.1 ml/m2 LA Vol Index BP:   19.2 ml/m2 LA Area A4C:       11.8 cm2 LA Area A2C:       19.1 cm2 LA Major Axis A4C: 4.3 cm LA Major Axis A2C: 5.1 cm LA Volume Index:   18.8 ml/m2 LA Vol A4C:        22.8 ml LA Vol A2C:        59.2 ml LA Vol Index BSA:  17.6 ml/m2  RA VOLUME BY A/L METHOD:          Normal Ranges: RA Area A4C:             15.2 cm2  AORTA MEASUREMENTS:         Normal Ranges: Ao Sinus, d:        3.50 cm (2.1-3.5cm)  LV SYSTOLIC FUNCTION BY 2D PLANIMETRY (MOD):                      Normal Ranges: EF-A4C View:    27 % (>=55%) EF-A2C View:    38 % EF-Biplane:     32 % LV EF Reported: 32 %  LV DIASTOLIC FUNCTION:             Normal Ranges: MV Peak E:             0.62 m/s    (0.7-1.2 m/s) MV Peak A:             1.15 m/s    (0.42-0.7 m/s) E/A Ratio:             0.54        (1.0-2.2) MV e'                  0.050 m/s   (>8.0) MV lateral e'          0.07 m/s MV medial e'           0.03 m/s MV A Dur:              123.69 msec E/e' Ratio:            12.48       (<8.0)  MITRAL VALVE:          Normal Ranges: MV DT:         114 msec (150-240msec)  AORTIC VALVE:                     Normal Ranges: AoV Vmax:                1.13 m/s (<=1.7m/s) AoV Peak P.1 mmHg (<20mmHg) AoV Mean P.9 mmHg (1.7-11.5mmHg) LVOT Max Trey:            0.86 m/s (<=1.1m/s) AoV VTI:                 23.82 cm (18-25cm) LVOT VTI:                15.82 cm LVOT Diameter:           2.44 cm  (1.8-2.4cm) AoV Area, VTI:           3.11 cm2 (2.5-5.5cm2) AoV Area,Vmax:           3.56 cm2 (2.5-4.5cm2) AoV Dimensionless Index: 0.66  RIGHT VENTRICLE: RV Basal 3.30 cm RV Mid   2.40 cm RV Major 9.2 cm TAPSE:   23.0 mm RV s'    0.09 m/s  TRICUSPID VALVE/RVSP:          Normal Ranges: Peak TR Velocity:     0.69 m/s RV Syst Pressure:     5 mmHg   (< 30mmHg) IVC Diam:             1.00 cm  PULMONIC VALVE:          Normal Ranges: PV Max Trey:     0.8 m/s  (0.6-0.9m/s) PV Max P.4 mmHg  [id number] Alli Nur MD Electronically signed on 2024 at 1:50:43 PM  Wall Scoring  ** Final **     Point of Care Ultrasound    Result Date: 2024  Gerry Diaz MD     2024  3:36 AM Performed by: Gerry Diaz MD Authorized by: Gerry Diaz MD  Cardiac Indications: chest pain Procedure: Cardiac Ultrasound Findings:  Views: parasternal long, apical four and subxiphoid The pericardial space was visualized and was NEGATIVE for a significant pericardial effusion. Activity: Ventricular contractions were visualized. LV: LV systolic function was NORMAL. RV: RV size was NORMAL. Impression: Cardiac: The focused cardiac ultrasound exam was NORMAL.     XR chest 1 view    Result Date: 2024  Interpreted By:  Topher Coyle, STUDY: XR CHEST 1 VIEW;  2024 3:28 am   INDICATION: Signs/Symptoms:Chest Pain.   COMPARISON: Chest x-ray 10/17/2023   ACCESSION NUMBER(S): [id number]   ORDERING CLINICIAN: GERRY DIAZ   FINDINGS: Multiple overlying leads are present.   CARDIOMEDIASTINAL SILHOUETTE: Cardiomediastinal silhouette is normal in size and  configuration.   LUNGS: No consolidation, pleural effusion or pneumothorax. Note is made of low lung volumes with bronchovascular crowding. Bibasilar atelectasis.   ABDOMEN: No remarkable upper abdominal findings.   BONES: No acute osseous abnormality.       Hypoventilatory changes with bibasilar atelectasis. No consolidation.   MACRO: None   Signed by: Topher Coyle 11/29/2024 3:32 AM Dictation workstation:   [id number]       Assessment/Plan   fide Rodrigues is a 69 y.o. year old male patient with PMHx of CAD s/p stent 20 years ago, hypertension, hyperlipidemia, DM2 (A1C 7.9% 6/2024) admitted to the OSH 11/29/2024 with complaint of mid sternal chest pain not relieved by nitroglycerine. TTE showed LVEF of 32% and abnormal left ventricular wall motion. L heart cath on 11/29, found severe, focal stenosis of the mid and distal left anterior descending artery, mid right coronary artery, and chronically totally occluded proximal left circumflex with left to left collaterals. Pt was transferred to the CICU for further evaluation and management.       Neuro  AFSHAN     Cardio  #NSTEMI  #S/p Cardiac cath 11/29/2024  ::Troponin up-trending 23 -> 36 -> 1778   ::Brilinta given 11/29  ::L heart cath on 11/29, found severe, focal stenosis of the mid and distal left anterior descending artery, mid right coronary artery, and chronically totally occluded proximal left circumflex with left to left collaterals.  -S/P Nitroglycerin     Plan  -CTS c/s  -c/w Heparin gtt   -Atorva 80  -ASA  -Hold P2Y12i  -Hold ACEi/ARB     #HFrEF (32%, 11/29/24)  #ischemic CM  ::TTE 11/29 EF 32% with LV wall motion abnormalities  ::No previous echo on file  -start metoprolol 12.5 mg q6 hour   -will need GDMT      #HTN  ::Home meds: coreg 3.125, losartan 25    Plan:  ::Will hold for now      Pulm  NA     GI  PPI     Renal  #BRUNILDA  ::likely prerenal in setting of reduced EF  ::Cr 1.77 -->1.36   -avoid nephrotoxins  -urine electrolytes     Endo  #T2DM  ::Not on  home meds  ::A1c 7.4  -SSI        F: PRN    E: PRN  N: Cardiac disease      Lines: PIV  O2: RA  Abx: None     DVT PPX: Heparin gtt  GI PPX: Pantoprazole      Code Status (confirmed on admission): Full code   Surrogate Decision Maker:   Daya Suazo   [phone]         Assessment & Plan  NSTEMI (non-ST elevated myocardial infarction) (Multi)      Reyna Nobles MD

## 2024-11-30 NOTE — PROGRESS NOTES
Cardiac ICU Progress Note, 11/30/2024    Admit Date: 11/29/2024   Hospital Length of Stay: 1   ICU Length of Stay: 13h     History of Present Illness  Lukasz Rodrigues is a 69 y.o. male on day 13h of admission for NSTEMI (non-ST elevated myocardial infarction) (Multi).    Interval History  Chest pain free and off nitroglycerin gtt  Started hydralazine for afterload reduction  Consulting CTS for CABG eval  Stable for floor transfer    Subjective  Patient is alert and oriented, is chest pain free and has no other concerns.    Objective    Vitals  Visit Vitals  /78   Pulse 79   Temp 36.2 °C (97.2 °F)   Resp 20     I/O    Intake/Output Summary (Last 24 hours) at 11/30/2024 0757  Last data filed at 11/30/2024 0600  Gross per 24 hour   Intake 122.04 ml   Output 950 ml   Net -827.96 ml        Physical Exam  Physical Exam  Cardiovascular:      Rate and Rhythm: Normal rate and regular rhythm.   Pulmonary:      Effort: Pulmonary effort is normal.      Breath sounds: Normal breath sounds.   Abdominal:      General: Bowel sounds are normal.      Palpations: Abdomen is soft.   Musculoskeletal:         General: No swelling.   Neurological:      Mental Status: He is alert and oriented to person, place, and time.         Labs    CBC:  Results from last 7 days   Lab Units 11/30/24  0541 11/29/24 2006 11/29/24  0826   WBC AUTO x10*3/uL 9.1 11.8* 9.2   HEMOGLOBIN g/dL 14.0 14.7 14.8   PLATELETS AUTO x10*3/uL 203 224 203     BMP:  Results from last 7 days   Lab Units 11/30/24  0541 11/29/24 2006 11/29/24  1158   SODIUM mmol/L 136 138 136   POTASSIUM mmol/L 4.3 4.2 4.0   CHLORIDE mmol/L 105 105 105   CO2 mmol/L 21 22 22   ANION GAP mmol/L 14 15 13   BUN mg/dL 25* 25* 27*   CREATININE mg/dL 1.39* 1.60* 1.36*   EGFR mL/min/1.73m*2 56* 47* 57*   CALCIUM mg/dL 8.8 8.7 8.6   PHOSPHORUS mg/dL 4.2 3.0 2.8   MAGNESIUM mg/dL 2.29 1.78 1.63   GLUCOSE mg/dL 171* 185* 146*     LFT:  Results from last 7 days   Lab Units 11/29/24 2006  11/29/24  0257   AST U/L 42* 19   ALT U/L 25 26   ALK PHOS U/L 76 83   BILIRUBIN TOTAL mg/dL 0.5 0.4   BILIRUBIN DIRECT mg/dL 0.1  --      Cardiac:  Results from last 7 days   Lab Units 11/30/24 0225 11/29/24 2351 11/29/24 0826 11/29/24  0354 11/29/24  0257   TROPHS ng/L  --   --  1,778*   < > 23*   TROPHSCMC ng/L 7,722* 8,460*  --   --   --    BNP pg/mL  --   --   --   --  72    < > = values in this interval not displayed.     Coag:  Results from last 7 days   Lab Units 11/29/24 2351 11/29/24 0826 11/29/24  0310   PROTIME seconds 11.5 11.6 11.0   APTT seconds 31 193*  --    INR  1.0 1.0 1.0     UA:       ABG:  Results from last 7 days   Lab Units 11/29/24 2006   FIO2 % 21     VBG:  Results from last 7 days   Lab Units 11/29/24 2006   POCT PH, VENOUS pH 7.45*   POCT PCO2, VENOUS mm Hg 35*       Cardiac Data    EKG  Encounter Date: 10/31/24   ECG 12 lead (Clinic Performed)   Result Value    Ventricular Rate 80    Atrial Rate 80    NV Interval 180    QRS Duration 108    QT Interval 376    QTC Calculation(Bazett) 433    P Axis 59    R Axis 40    T Axis 112    QRS Count 13    Q Onset 215    P Onset 125    P Offset 159    T Offset 403    QTC Fredericia 414    Narrative    Sinus rhythm with frequent and consecutive Premature ventricular complexes  Nonspecific ST-T changes  Abnormal ECG  No previous ECGs available  Confirmed by New Lacy ([id number]) on 11/5/2024 5:06:24 PM      Transthoracic Echo (TTE) 11/29/2024  1. Apical septal segment, apical lateral segment, apical anterior segment, and apex are abnormal.  2. The left ventricular systolic function is moderately decreased, with a Mccartney's biplane calculated ejection fraction of 32%.  3. Abnormal left venticular wall motion.  4. Spectral Doppler shows a Grade I (impaired relaxation pattern) of left ventricular diastolic filling with normal left atrial filling pressure.  5. Left ventricular cavity size is moderately dilated.  6. However, LV cavity size is normal,  when indexed to body surface area.  7. There is normal right ventricular global systolic function.    Left Heart Cath 11/29/2024  1. Ischemic cardiomyopathy.  2. Severe, focal stenosis of the mid and distal left anterior descending artery, mid right coronary artery, and chronically totally occluded proximal left circumflex with left to left collaterals.  3. Guideline directed medical therapy for ACS and heart failure.  4. Will transfer to [facility] for evaluation for bypass surgery.    Imaging  XR chest 1 view    Result Date: 11/29/2024  Hypoventilatory changes with bibasilar atelectasis. No consolidation.   MACRO: None   Signed by: Topher Coyle 11/29/2024 3:32 AM Dictation workstation:   [id number]       Inpatient Medications    Continuous medications  heparin, 0-4,000 Units/hr, Last Rate: 1,300 Units/hr (11/30/24 0600)  nitroglycerin, 5-200 mcg/min, Last Rate: Stopped (11/30/24 0715)      Scheduled medications  aspirin, 81 mg, oral, Daily  atorvastatin, 80 mg, oral, Nightly  [Held by provider] carvedilol, 3.125 mg, oral, BID  pantoprazole, 40 mg, oral, Daily before breakfast   Or  esomeprazole, 40 mg, nasoduodenal tube, Daily before breakfast   Or  pantoprazole, 40 mg, intravenous, Daily before breakfast  insulin lispro, 0-5 Units, subcutaneous, TID AC  isosorbide dinitrate, 10 mg, oral, TID  metoprolol tartrate, 12.5 mg, oral, q6h      PRN medications  PRN medications: dextrose, dextrose, glucagon, glucagon, heparin, ipratropium-albuteroL      Assessment & Plan  Lukasz Rodrigues is a 69 y.o. male with a PMH of CAD s/p stent 20 years ago, hypertension, hyperlipidemia, DM2 (A1C 7.9% 6/2024) on 13h of CICU admission for NSTEMI.     Admitted to the OSH 11/29/2024 with complaint of mid sternal chest pain not relieved by nitroglycerine. TTE showed LVEF of 32% and abnormal left ventricular wall motion. L heart cath on 11/29, found severe, focal stenosis of the mid and distal LAD, mid RCA, and   proximal left Cx with left to left collaterals. He was transferred to the CICU for further evaluation and management.     Started on nitroglycerin gtt. Will consult CTS today for evaluation. Patient will also need GDMT in the setting of newly diagnosed HFrEF.    Neurologic  - AFSHAN    Cardiovascular  #NSTEMI  - Troponin up-trending 23 -> 36 -> 1778   - Brilinta loaded on 11/29  - Coronary angiogram 11/29/2024: severe, focal stenosis of the mid and distal LAD, mid RCA, and  proximal left Cx with left to left collaterals.  - S/p Nitroglycerin gtt, now off  Plan:  CTS consult  Will start Hydralazine 10 TID  C/w Heparin gtt   C/w Atorva 80 mg  C/w Aspirin 81 mg  Holding Brilinta  Holding ACEi/ARB    #HFrEF (32%, 11/29/24)  #Ischemic Cardiomyopathy  - TTE 11/29: EF 32% with LV wall motion abnormalities  - No previous echo on file  Metoprolol tartrate 12.5 mg q6h   Start rest of GDMT when appropriate     #HTN  - Home meds: coreg 3.125, losartan 25 (on hold)    Respiratory  - AFSHAN    Gastrointestinal  - AFSHAN    Renal  #BRUNILDA  - Likely prerenal in setting of reduced EF  - Cr 1.77 -->1.36   Plan:  Avoid nephrotoxins  Urine electrolytes    Endocrine  #T2DM  - Not on home meds  - A1c 7.4  Plan:  SSI    Fluids: PRN  Electrolytes: PRN  Nutrition: Adult diet Regular, Cardiac; 70 gm fat; 2 - 3 grams Sodium  Lines: PRN  DVT prophylaxis: on therapeutic AC w/ Heparin gtt  GI prophylaxis: Pantoprazole     Code Status: Full Code (Confirmed on admission)   Emergency Contact / NOK: Extended Emergency Contact Information  Primary Emergency Contact: Daya Suazo  Mobile Phone: [phone]  Relation: Daughter   needed? No        Tee Junior MD, MSc  Internal Medicine PGY-1    11/30/2024

## 2024-11-30 NOTE — CONSULTS
CARDIAC SURGERY CONSULT NOTE    HISTORY OF PRESENT ILLNESS    Lukasz Rodrigues is a 69 y.o. male w/ PMHx of CAD s/p stent (~10 yrs ago at [facility] by Dr Arriaza), hypertension, hyperlipidemia, DM2 (A1C 7.9% 6/2024), referred by Dr. Shipley of cardiology for possible coronary bypass grafting. He reports dyspnea, exertional chest pressure/discomfort, and palpitations.   He presented to [facility] ED on 11/29/24 with mid sternal chest pain, not relieved with rest or nitroglycerine. Patient is admitted to ICU on 11/29/24 for ongoing chest pain requiring nitroglycerine drip in the setting of unstable angina or NSTEMI.    Per patient, frequency and severity of chest pain/pressure has been increasing in the past few weeks after seeing cardiology (Dr New Lacy) on 10/31.  Patient started having chest pain at rest last evening. Took 3 nitroglycerine tabs and 3 81mg aspirin tabs at home which did not provide him any releief, hence called for EMS. He received an additional dose of ASA 81 by EMS for the total of 324mg of aspirin. Upon arrival to ED, patient was still having 8/10 mid sternal chest pain with tingling in his right fingers.   Currently, endorses improvement in chest pain 3/10 with nitroglycerine drip, but continues to have chest pressure. Denies associated HA, fever/chills, nausea/vomiting, chest pain/discomfort, orthopnea/PND, SOB, palpitations, abdominal pain, changes to weight, appetite, urination or bowel movement.    Reason for Consult: NSTEMI, multivessel disease    Cardiac/Pertinent Imaging  LHC: Coronary Angiography:  The coronary circulation is right dominant.     Left Main Coronary Artery:  The left main coronary artery is a normal caliber vessel. The left main arises normally from the left coronary sinus of Valsalva and bifurcates into the LAD and circumflex coronary arteries. The left main coronary artery showed no significant disease or stenosis greater than 30%.     Left Anterior Descending Coronary Artery  Distribution:  The left anterior descending coronary artery is a normal caliber vessel. The LAD arises normally from the left main coronary artery. The LAD demonstrated severe mid-segment obstruction. The mid left anterior descending coronary artery showed 80% stenosis. An additional lesion, located at the distal left anterior descending coronary artery, revealed 80% stenosis.     Circumflex Coronary Artery Distribution:  The circumflex coronary artery is a normal caliber vessel. The circumflex arises normally from the left main coronary artery and terminates in the AV groove. The circumflex revealed total occlusion originating at the proximal segment. The proximal circumflex coronary artery showed 100% stenosis. The first obtuse marginal branch and continuation of the left circumflex fills via left to left collaterals.     Right Coronary Artery Distribution:     The right coronary artery is a normal caliber vessel. The RCA arises normally from the right sinus of Valsalva. The RCA showed severe mid-segment obstruction. The mid right coronary artery showed 80% stenosis.    Echo: CONCLUSIONS:   1. Apical septal segment, apical lateral segment, apical anterior segment, and apex are abnormal.   2. The left ventricular systolic function is moderately decreased, with a Mccartney's biplane calculated ejection fraction of 32%.   3. Abnormal left venticular wall motion.   4. Spectral Doppler shows a Grade I (impaired relaxation pattern) of left ventricular diastolic filling with normal left atrial filling pressure.   5. Left ventricular cavity size is moderately dilated.   6. However, LV cavity size is normal, when indexed to body surface area.   7. There is normal right ventricular global systolic function.    Subjective   Past Medical History:   Diagnosis Date    Abscess 11/12/2018    Foot pain, bilateral 11/12/2018    Other fatigue 11/12/2018     No past surgical history on file.  Social History     Tobacco Use    Smoking  status: Never    Smokeless tobacco: Never   Vaping Use    Vaping status: Never Used   Substance Use Topics    Alcohol use: Never    Drug use: Never     No family history on file.    Patient has no known allergies.    Prior to Admission medications    Medication Sig Start Date End Date Taking? Authorizing Provider   albuterol (Proventil HFA) 90 mcg/actuation inhaler Inhale 2 puffs every 4 hours if needed for wheezing, shortness of breath or other (cough). 6/24/24   Last Ramirez,    aspirin 81 mg EC tablet Take 1 tablet (81 mg) by mouth once daily.    Historical Provider, MD   carvedilol (Coreg) 3.125 mg tablet Take 1 tablet (3.125 mg) by mouth 2 times a day. 10/31/24 10/31/25  New Lacy MD   losartan (Cozaar) 25 mg tablet Take 1 tablet (25 mg) by mouth once daily. 10/31/24 10/31/25  New Lacy MD   nitroglycerin (Nitrostat) 0.4 mg SL tablet Place 1 tablet (0.4 mg) under the tongue every 5 minutes if needed for chest pain. Call 911 if third dose is used or chest pain persists. 11/27/24   New Lacy MD   rosuvastatin (Crestor) 10 mg tablet Take 1 tablet (10 mg) by mouth once daily at bedtime. 10/31/24 10/31/25  New Lacy MD   nitroglycerin (Nitrostat) 0.4 mg SL tablet Place 1 tablet (0.4 mg) under the tongue every 5 minutes if needed for chest pain. Call 911 if third dose is used or chest pain persists. 10/31/24 11/27/24  New Lacy MD       Review of Systems  Review of Systems   Constitutional: Negative.    HENT: Negative.     Eyes: Negative.    Respiratory:  Positive for chest tightness.    Cardiovascular:  Positive for chest pain.   Gastrointestinal: Negative.    Endocrine: Negative.    Genitourinary: Negative.    Musculoskeletal: Negative.    Skin: Negative.    Allergic/Immunologic: Negative.    Neurological: Negative.    Hematological: Negative.    Psychiatric/Behavioral: Negative.             Objective   /67   Pulse 91   Temp 36.1 °C (97 °F)   Resp 19   Ht 1.803 m (5'  "11\")   Wt 112 kg (246 lb)   SpO2 99%   BMI 34.31 kg/m²   0-10 (Numeric) Pain Score: 0 - No pain   Vitals:    11/29/24 1838   Weight: 112 kg (246 lb)          Intake/Output Summary (Last 24 hours) at 11/29/2024 2051  Last data filed at 11/29/2024 2000  Gross per 24 hour   Intake --   Output 350 ml   Net -350 ml       Physical Exam  Physical Exam  Constitutional:       Appearance: Normal appearance. He is obese.   HENT:      Head: Normocephalic.      Nose: Nose normal.      Mouth/Throat:      Mouth: Mucous membranes are moist.   Eyes:      Extraocular Movements: Extraocular movements intact.      Conjunctiva/sclera: Conjunctivae normal.      Pupils: Pupils are equal, round, and reactive to light.   Cardiovascular:      Rate and Rhythm: Normal rate and regular rhythm.      Pulses: Normal pulses.      Heart sounds: Normal heart sounds.   Pulmonary:      Effort: Pulmonary effort is normal.      Breath sounds: Normal breath sounds.   Abdominal:      General: Bowel sounds are normal.      Palpations: Abdomen is soft.   Musculoskeletal:         General: Normal range of motion.      Cervical back: Normal range of motion and neck supple.   Skin:     General: Skin is warm and dry.      Capillary Refill: Capillary refill takes less than 2 seconds.   Neurological:      General: No focal deficit present.      Mental Status: He is alert and oriented to person, place, and time. Mental status is at baseline.   Psychiatric:         Mood and Affect: Mood normal.         Behavior: Behavior normal.         Thought Content: Thought content normal.         Judgment: Judgment normal.       Medications  Scheduled medications  aspirin, 81 mg, oral, Daily  atorvastatin, 80 mg, oral, Nightly  [Held by provider] carvedilol, 3.125 mg, oral, BID  [START ON 11/30/2024] pantoprazole, 40 mg, oral, Daily before breakfast   Or  [START ON 11/30/2024] esomeprazole, 40 mg, nasoduodenal tube, Daily before breakfast   Or  [START ON 11/30/2024] " pantoprazole, 40 mg, intravenous, Daily before breakfast  [START ON 11/30/2024] insulin lispro, 0-5 Units, subcutaneous, TID AC    Continuous medications  heparin, 0-4,000 Units/hr, Last Rate: 1,000 Units/hr (11/29/24 2022)    PRN medications  PRN medications: dextrose, dextrose, glucagon, glucagon, heparin, ipratropium-albuteroL    Labs  Results for orders placed or performed during the hospital encounter of 11/29/24 (from the past 24 hours)   Blood Gas Venous Full Panel   Result Value Ref Range    POCT pH, Venous 7.45 (H) 7.33 - 7.43 pH    POCT pCO2, Venous 35 (L) 41 - 51 mm Hg    POCT pO2, Venous 45 35 - 45 mm Hg    POCT SO2, Venous 73 45 - 75 %    POCT Oxy Hemoglobin, Venous 71.0 45.0 - 75.0 %    POCT Hematocrit Calculated, Venous 45.0 41.0 - 52.0 %    POCT Sodium, Venous 136 136 - 145 mmol/L    POCT Potassium, Venous 4.4 3.5 - 5.3 mmol/L    POCT Chloride, Venous 104 98 - 107 mmol/L    POCT Ionized Calicum, Venous 1.21 1.10 - 1.33 mmol/L    POCT Glucose, Venous 222 (H) 74 - 99 mg/dL    POCT Lactate, Venous 2.0 0.4 - 2.0 mmol/L    POCT Base Excess, Venous 0.8 -2.0 - 3.0 mmol/L    POCT HCO3 Calculated, Venous 24.3 22.0 - 26.0 mmol/L    POCT Hemoglobin, Venous 14.9 13.5 - 17.5 g/dL    POCT Anion Gap, Venous 12.0 10.0 - 25.0 mmol/L    Patient Temperature 37.0 degrees Celsius    FiO2 21 %   POCT GLUCOSE   Result Value Ref Range    POCT Glucose 189 (H) 74 - 99 mg/dL     ASSESSMENT & PLAN  Lukasz Rodrigues is a 69 y.o. male w/ PMHx of CAD s/p stent (~10 yrs ago at [facility] by Dr Arriaza), hypertension, hyperlipidemia, DM2 (A1C 7.9% 6/2024), referred by Dr. Shipley of cardiology for possible coronary bypass grafting. [facility] today showed multivessel disease.    RECOMMENDATIONS/PLAN  Dr. Drew aware of patient, currently reviewing case and available imaging.   Emergent cardiac surgery not indicated at this time.   No OR date established, will need further preoperative testing.   - Please obtain and upload OSH imaging to [facility]/PACS  if not already done  - Medical optimization per primary team    Preop risk stratification studies/labs to be ordered:   - US Carotids/Vein Mapping/ LE US BOB  - PFTs (spirometry and room air ABG)  - 2 view CXR  - MRSA, UA/Culture, LFTs, HgbA1c, TSH/T4, Lipid panel, CBC, RFP, Coag  - CT chest without contrast   - Will need evaluation for dental carries/infection with Panorex/CT facial bones +/- dental consult  - Dental evaluation not indicated for isolated CABG surgeries     Preop orders when/if goes to surgery:  - Continue ASA, high-intensity statin, and BB (or document contraindications for use) for preop CABG patients   - No ACEi/ARBs in the pre-op period (at least 48 hours)  - Hold any SGLT2 inhibitors (Farxiga, Jardiance, etc.) for at least 3 days prior to cardiac surgery to prevent euglycemic DKA  - Hold any daily GLP-1 agonist drugs on the day of surgery. Hold any weekly GLP-1 drugs for 7 days prior to surgery. (Dulaglutide, Exenatide, Liraglutide, Lixisenatide, & Semaglutide)  - No antiplatelets other than ASA, no anticoagulants other than Heparin  - NPO after midnight, blood on hold/T&S, and preop scrubs only to be ordered once OR date is established    Will continue to follow along.  Thank you for the consultation.   Patient educated and all questions answered.  Please page the cardiac surgery consult pager [phone] with any questions or changes in patient condition.    Georgie Green DO  Cardiac Surgery Consult Resident  [facility]  Cardiac Surgery Consult Pager [phone]     11/29/2024  8:51 PM

## 2024-12-01 ENCOUNTER — APPOINTMENT (OUTPATIENT)
Dept: RADIOLOGY | Facility: HOSPITAL | Age: 69
End: 2024-12-01
Payer: MEDICARE

## 2024-12-01 VITALS
HEIGHT: 71 IN | BODY MASS INDEX: 37.52 KG/M2 | WEIGHT: 268 LBS | HEART RATE: 82 BPM | SYSTOLIC BLOOD PRESSURE: 118 MMHG | DIASTOLIC BLOOD PRESSURE: 52 MMHG | OXYGEN SATURATION: 96 % | RESPIRATION RATE: 19 BRPM | TEMPERATURE: 96.8 F

## 2024-12-01 LAB
ALBUMIN SERPL BCP-MCNC: 3.6 G/DL (ref 3.4–5)
ANION GAP BLDMV CALCULATED.4IONS-SCNC: 7 MMO/L (ref 10–25)
ANION GAP SERPL CALC-SCNC: 14 MMOL/L (ref 10–20)
ANION GAP SERPL CALC-SCNC: 15 MMOL/L (ref 10–20)
APPEARANCE UR: CLEAR
BACTERIA BLD CULT: NORMAL
BACTERIA BLD CULT: NORMAL
BASE EXCESS BLDMV CALC-SCNC: 1.5 MMOL/L (ref -2–3)
BILIRUB UR STRIP.AUTO-MCNC: NEGATIVE MG/DL
BODY TEMPERATURE: 37 DEGREES CELSIUS
BUN SERPL-MCNC: 28 MG/DL (ref 6–23)
BUN SERPL-MCNC: 29 MG/DL (ref 6–23)
CA-I BLDMV-SCNC: 1.19 MMOL/L (ref 1.1–1.33)
CALCIUM SERPL-MCNC: 8.3 MG/DL (ref 8.6–10.6)
CALCIUM SERPL-MCNC: 8.6 MG/DL (ref 8.6–10.6)
CHLORIDE BLD-SCNC: 101 MMOL/L (ref 98–107)
CHLORIDE SERPL-SCNC: 101 MMOL/L (ref 98–107)
CHLORIDE SERPL-SCNC: 103 MMOL/L (ref 98–107)
CHLORIDE UR-SCNC: 18 MMOL/L
CHLORIDE/CREATININE (MMOL/G) IN URINE: 10 MMOL/G CREAT (ref 23–275)
CO2 SERPL-SCNC: 21 MMOL/L (ref 21–32)
CO2 SERPL-SCNC: 25 MMOL/L (ref 21–32)
COLOR UR: ABNORMAL
CREAT SERPL-MCNC: 2.4 MG/DL (ref 0.5–1.3)
CREAT SERPL-MCNC: 2.48 MG/DL (ref 0.5–1.3)
CREAT UR-MCNC: 184.5 MG/DL (ref 20–370)
EGFRCR SERPLBLD CKD-EPI 2021: 28 ML/MIN/1.73M*2
EGFRCR SERPLBLD CKD-EPI 2021: 29 ML/MIN/1.73M*2
ERYTHROCYTE [DISTWIDTH] IN BLOOD BY AUTOMATED COUNT: 13.4 % (ref 11.5–14.5)
GLUCOSE BLD MANUAL STRIP-MCNC: 179 MG/DL (ref 74–99)
GLUCOSE BLD MANUAL STRIP-MCNC: 198 MG/DL (ref 74–99)
GLUCOSE BLD MANUAL STRIP-MCNC: 236 MG/DL (ref 74–99)
GLUCOSE BLD-MCNC: 180 MG/DL (ref 74–99)
GLUCOSE SERPL-MCNC: 184 MG/DL (ref 74–99)
GLUCOSE SERPL-MCNC: 215 MG/DL (ref 74–99)
GLUCOSE UR STRIP.AUTO-MCNC: NORMAL MG/DL
HCO3 BLDMV-SCNC: 26.6 MMOL/L (ref 22–26)
HCT VFR BLD AUTO: 37.4 % (ref 41–52)
HCT VFR BLD EST: 36 % (ref 41–52)
HGB BLD-MCNC: 12.4 G/DL (ref 13.5–17.5)
HGB BLDMV-MCNC: 11.9 G/DL (ref 13.5–17.5)
HOLD SPECIMEN: NORMAL
INHALED O2 CONCENTRATION: 21 %
KETONES UR STRIP.AUTO-MCNC: NEGATIVE MG/DL
LACTATE BLDMV-SCNC: 0.9 MMOL/L (ref 0.4–2)
LEUKOCYTE ESTERASE UR QL STRIP.AUTO: ABNORMAL
MAGNESIUM SERPL-MCNC: 1.79 MG/DL (ref 1.6–2.4)
MCH RBC QN AUTO: 28.6 PG (ref 26–34)
MCHC RBC AUTO-ENTMCNC: 33.2 G/DL (ref 32–36)
MCV RBC AUTO: 86 FL (ref 80–100)
MUCOUS THREADS #/AREA URNS AUTO: ABNORMAL /LPF
NITRITE UR QL STRIP.AUTO: NEGATIVE
NRBC BLD-RTO: 0 /100 WBCS (ref 0–0)
OXYHGB MFR BLDMV: 66.3 % (ref 45–75)
PCO2 BLDMV: 43 MM HG (ref 41–51)
PH BLDMV: 7.4 PH (ref 7.33–7.43)
PH UR STRIP.AUTO: 5.5 [PH]
PHOSPHATE SERPL-MCNC: 5.1 MG/DL (ref 2.5–4.9)
PLATELET # BLD AUTO: 186 X10*3/UL (ref 150–450)
PO2 BLDMV: 41 MM HG (ref 35–45)
POTASSIUM BLDMV-SCNC: 4.3 MMOL/L (ref 3.5–5.3)
POTASSIUM SERPL-SCNC: 4.5 MMOL/L (ref 3.5–5.3)
POTASSIUM SERPL-SCNC: 4.8 MMOL/L (ref 3.5–5.3)
POTASSIUM UR-SCNC: 66 MMOL/L
POTASSIUM/CREAT UR-RTO: 36 MMOL/G CREAT
PROT UR STRIP.AUTO-MCNC: ABNORMAL MG/DL
RBC # BLD AUTO: 4.33 X10*6/UL (ref 4.5–5.9)
RBC # UR STRIP.AUTO: NEGATIVE /UL
RBC #/AREA URNS AUTO: ABNORMAL /HPF
SAO2 % BLDMV: 68 % (ref 45–75)
SODIUM BLDMV-SCNC: 130 MMOL/L (ref 136–145)
SODIUM SERPL-SCNC: 134 MMOL/L (ref 136–145)
SODIUM SERPL-SCNC: 135 MMOL/L (ref 136–145)
SODIUM UR-SCNC: 28 MMOL/L
SODIUM/CREAT UR-RTO: 15 MMOL/G CREAT
SP GR UR STRIP.AUTO: 1.02
UFH PPP CHRO-ACNC: 0.3 IU/ML
UFH PPP CHRO-ACNC: 0.4 IU/ML
UFH PPP CHRO-ACNC: 0.8 IU/ML
UFH PPP CHRO-ACNC: >2 IU/ML
UROBILINOGEN UR STRIP.AUTO-MCNC: NORMAL MG/DL
WBC # BLD AUTO: 15.5 X10*3/UL (ref 4.4–11.3)
WBC #/AREA URNS AUTO: ABNORMAL /HPF

## 2024-12-01 PROCEDURE — 82374 ASSAY BLOOD CARBON DIOXIDE: CPT

## 2024-12-01 PROCEDURE — 2500000001 HC RX 250 WO HCPCS SELF ADMINISTERED DRUGS (ALT 637 FOR MEDICARE OP)

## 2024-12-01 PROCEDURE — 2500000002 HC RX 250 W HCPCS SELF ADMINISTERED DRUGS (ALT 637 FOR MEDICARE OP, ALT 636 FOR OP/ED)

## 2024-12-01 PROCEDURE — 2500000004 HC RX 250 GENERAL PHARMACY W/ HCPCS (ALT 636 FOR OP/ED)

## 2024-12-01 PROCEDURE — 85520 HEPARIN ASSAY: CPT

## 2024-12-01 PROCEDURE — 83735 ASSAY OF MAGNESIUM: CPT

## 2024-12-01 PROCEDURE — 81001 URINALYSIS AUTO W/SCOPE: CPT

## 2024-12-01 PROCEDURE — 99291 CRITICAL CARE FIRST HOUR: CPT | Performed by: STUDENT IN AN ORGANIZED HEALTH CARE EDUCATION/TRAINING PROGRAM

## 2024-12-01 PROCEDURE — 36415 COLL VENOUS BLD VENIPUNCTURE: CPT

## 2024-12-01 PROCEDURE — 02HQ32Z INSERTION OF MONITORING DEVICE INTO RIGHT PULMONARY ARTERY, PERCUTANEOUS APPROACH: ICD-10-PCS

## 2024-12-01 PROCEDURE — 99291 CRITICAL CARE FIRST HOUR: CPT

## 2024-12-01 PROCEDURE — 80048 BASIC METABOLIC PNL TOTAL CA: CPT | Mod: CCI

## 2024-12-01 PROCEDURE — 82436 ASSAY OF URINE CHLORIDE: CPT

## 2024-12-01 PROCEDURE — 37799 UNLISTED PX VASCULAR SURGERY: CPT

## 2024-12-01 PROCEDURE — 71045 X-RAY EXAM CHEST 1 VIEW: CPT | Performed by: RADIOLOGY

## 2024-12-01 PROCEDURE — 85027 COMPLETE CBC AUTOMATED: CPT

## 2024-12-01 PROCEDURE — 1100000001 HC PRIVATE ROOM DAILY

## 2024-12-01 PROCEDURE — 84132 ASSAY OF SERUM POTASSIUM: CPT | Performed by: STUDENT IN AN ORGANIZED HEALTH CARE EDUCATION/TRAINING PROGRAM

## 2024-12-01 PROCEDURE — 87086 URINE CULTURE/COLONY COUNT: CPT

## 2024-12-01 PROCEDURE — 71045 X-RAY EXAM CHEST 1 VIEW: CPT

## 2024-12-01 PROCEDURE — 82947 ASSAY GLUCOSE BLOOD QUANT: CPT

## 2024-12-01 PROCEDURE — 87040 BLOOD CULTURE FOR BACTERIA: CPT

## 2024-12-01 RX ORDER — METOPROLOL TARTRATE 25 MG/1
25 TABLET, FILM COATED ORAL ONCE
Status: COMPLETED | OUTPATIENT
Start: 2024-12-01 | End: 2024-12-01

## 2024-12-01 RX ORDER — ACETAMINOPHEN 325 MG/1
650 TABLET ORAL EVERY 4 HOURS PRN
Status: DISCONTINUED | OUTPATIENT
Start: 2024-12-01 | End: 2024-12-06 | Stop reason: HOSPADM

## 2024-12-01 RX ORDER — ACETAMINOPHEN 650 MG/1
650 SUPPOSITORY RECTAL EVERY 4 HOURS PRN
Status: DISCONTINUED | OUTPATIENT
Start: 2024-12-01 | End: 2024-12-06 | Stop reason: HOSPADM

## 2024-12-01 RX ORDER — ACETAMINOPHEN 160 MG/5ML
650 SOLUTION ORAL EVERY 4 HOURS PRN
Status: DISCONTINUED | OUTPATIENT
Start: 2024-12-01 | End: 2024-12-06 | Stop reason: HOSPADM

## 2024-12-01 RX ORDER — ROSUVASTATIN CALCIUM 20 MG/1
20 TABLET, COATED ORAL NIGHTLY
Status: DISCONTINUED | OUTPATIENT
Start: 2024-12-01 | End: 2024-12-05

## 2024-12-01 RX ORDER — MAGNESIUM SULFATE HEPTAHYDRATE 40 MG/ML
2 INJECTION, SOLUTION INTRAVENOUS ONCE
Status: COMPLETED | OUTPATIENT
Start: 2024-12-01 | End: 2024-12-01

## 2024-12-01 RX ORDER — METOPROLOL TARTRATE 25 MG/1
37.5 TABLET, FILM COATED ORAL EVERY 6 HOURS
Status: DISCONTINUED | OUTPATIENT
Start: 2024-12-01 | End: 2024-12-06 | Stop reason: HOSPADM

## 2024-12-01 ASSESSMENT — COGNITIVE AND FUNCTIONAL STATUS - GENERAL
MOBILITY SCORE: 24
DAILY ACTIVITIY SCORE: 24
PATIENT BASELINE BEDBOUND: NO

## 2024-12-01 ASSESSMENT — PAIN SCALES - GENERAL
PAINLEVEL_OUTOF10: 0 - NO PAIN
PAINLEVEL_OUTOF10: 0 - NO PAIN
PAINLEVEL_OUTOF10: 1
PAINLEVEL_OUTOF10: 0 - NO PAIN
PAINLEVEL_OUTOF10: 1
PAINLEVEL_OUTOF10: 3

## 2024-12-01 ASSESSMENT — PAIN - FUNCTIONAL ASSESSMENT
PAIN_FUNCTIONAL_ASSESSMENT: 0-10

## 2024-12-01 ASSESSMENT — PAIN DESCRIPTION - DESCRIPTORS: DESCRIPTORS: BURNING;ACHING

## 2024-12-01 NOTE — PROGRESS NOTES
Cardiac ICU Progress Note, 12/1/2024    Admit Date: 11/29/2024   Hospital Length of Stay: 2   ICU Length of Stay: 1d 12h     History of Present Illness  Lukasz Rodrigues is a 69 y.o. male on day 1d 12h of admission for NSTEMI (non-ST elevated myocardial infarction) (Multi).    Interval History  Patient had recurrent chest pain yesterday afternoon  Nitroglycerin restarted and uptitrated without significant improvement  IABP placed overnight  Will place [place] Josephine today  Urgent CHIP meeting tomorrow - pending cardiac surgery recs  Heart failure consulted  Holding HYD/ISDN    Subjective  Patient is alert and oriented. Still endorses 1-2/10 chest pain this morning, but denies shortness of breath or orthopnea. Did endorse feeling cold in the morning  but no recurrence.    Objective    Vitals  Visit Vitals  /52   Pulse 90   Temp 36.7 °C (98.1 °F) (Temporal)   Resp 17        Invasive Hemodynamics   Most Recent Range Past 24hrs   BP (Art) 111/49 Arterial Line BP 1  Min: 92/45  Max: 139/70   MAP(Art) 74 mmHg Arterial Line MAP 1 (mmHg)   Min: 63 mmHg  Max: 90 mmHg     I/O    Intake/Output Summary (Last 24 hours) at 12/1/2024 0647  Last data filed at 12/1/2024 0500  Gross per 24 hour   Intake 761.58 ml   Output 1120 ml   Net -358.42 ml        Physical Exam  Physical Exam  Cardiovascular:      Rate and Rhythm: Normal rate and regular rhythm.   Pulmonary:      Effort: Pulmonary effort is normal.      Breath sounds: Normal breath sounds.   Abdominal:      Palpations: Abdomen is soft.   Genitourinary:     Comments: Right femoral IABP insertion site: No bleeding, swelling, hematoma, or tenderness.  Neurological:      Mental Status: He is alert and oriented to person, place, and time.       Labs    CBC:  Results from last 7 days   Lab Units 12/01/24  0133 11/30/24  0541 11/29/24 2006   WBC AUTO x10*3/uL 15.5* 9.1 11.8*   HEMOGLOBIN g/dL 12.4* 14.0 14.7   PLATELETS AUTO x10*3/uL 186 203 224     BMP:  Results from last 7 days    Lab Units 12/01/24  0133 11/30/24  1700 11/30/24  0541 11/29/24 2006   SODIUM mmol/L 134* 134* 136 138   POTASSIUM mmol/L 4.8 4.9 4.3 4.2   CHLORIDE mmol/L 103 101 105 105   CO2 mmol/L 21 28 21 22   ANION GAP mmol/L 15 10 14 15   BUN mg/dL 28* 26* 25* 25*   CREATININE mg/dL 2.48* 1.54* 1.39* 1.60*   EGFR mL/min/1.73m*2 28* 49* 56* 47*   CALCIUM mg/dL 8.3* 9.0 8.8 8.7   PHOSPHORUS mg/dL  --  4.3 4.2 3.0   MAGNESIUM mg/dL 1.79 1.95 2.29 1.78   GLUCOSE mg/dL 215* 168* 171* 185*     LFT:  Results from last 7 days   Lab Units 11/29/24 2006 11/29/24  0257   AST U/L 42* 19   ALT U/L 25 26   ALK PHOS U/L 76 83   BILIRUBIN TOTAL mg/dL 0.5 0.4   BILIRUBIN DIRECT mg/dL 0.1  --      Cardiac:  Results from last 7 days   Lab Units 11/30/24  1833 11/30/24  0225 11/29/24  2351 11/29/24  0354 11/29/24  0257   TROPHS   --   --   --    < > 23*   TROPHSCMC ng/L 3,403* 7,722* 8,460*  --   --    BNP pg/mL  --   --   --   --  72    < > = values in this interval not displayed.     Coag:  Results from last 7 days   Lab Units 11/29/24 2351 11/29/24  0826 11/29/24  0310   PROTIME seconds 11.5 11.6 11.0   APTT seconds 31 193*  --    INR  1.0 1.0 1.0     UA:       ABG:  Results from last 7 days   Lab Units 11/30/24 2025 11/29/24 2006   POCT PH, ARTERIAL pH 7.45*  --    POCT PO2, ARTERIAL mm Hg 77*  --    POCT PCO2, ARTERIAL mm Hg 37*  --    FIO2 % 28 21     VBG:  Results from last 7 days   Lab Units 11/29/24 2006   POCT PH, VENOUS pH 7.45*   POCT PCO2, VENOUS mm Hg 35*       Cardiac Data    EKG  Encounter Date: 10/31/24   ECG 12 lead (Clinic Performed)   Result Value    Ventricular Rate 80    Atrial Rate 80    AK Interval 180    QRS Duration 108    QT Interval 376    QTC Calculation(Bazett) 433    P Axis 59    R Axis 40    T Axis 112    QRS Count 13    Q Onset 215    P Onset 125    P Offset 159    T Offset 403    QTC Fredericia 414    Narrative    Sinus rhythm with frequent and consecutive Premature ventricular complexes  Nonspecific  ST-T changes  Abnormal ECG  No previous ECGs available  Confirmed by [place] ([id number]) on 11/5/2024 5:06:24 PM      Transthoracic Echo (TTE) 11/29/2024  1. Apical septal segment, apical lateral segment, apical anterior segment, and apex are abnormal.  2. The left ventricular systolic function is moderately decreased, with a Mccartney's biplane calculated ejection fraction of 32%.  3. Abnormal left venticular wall motion.  4. Spectral Doppler shows a Grade I (impaired relaxation pattern) of left ventricular diastolic filling with normal left atrial filling pressure.  5. Left ventricular cavity size is moderately dilated.  6. However, LV cavity size is normal, when indexed to body surface area.  7. There is normal right ventricular global systolic function.     Coronary Angiogram 11/29/2024  1. Ischemic cardiomyopathy.  2. Severe, focal stenosis of the mid and distal left anterior descending artery, mid right coronary artery, and chronically totally occluded proximal left circumflex with left to left collaterals.  3. Guideline directed medical therapy for ACS and heart failure.  4. Will transfer to [facility] (formerly [facility]3] for evaluation for bypass surgery.    Imaging  XR chest 1 view    Result Date: 11/29/2024  Hypoventilatory changes with bibasilar atelectasis. No consolidation.   MACRO: None   Signed by: Topher Coyle 11/29/2024 3:32 AM Dictation workstation:   [id number]       Inpatient Medications    Continuous medications  heparin, 0-4,000 Units/hr, Last Rate: Stopped (11/30/24 2150)  heparin, 0-4,000 Units/hr, Last Rate: 1,700 Units/hr (12/01/24 0500)  labetalol, 0.5-2 mg/min, Last Rate: Stopped (11/30/24 2252)  nitroglycerin, 0-200 mcg/min, Last Rate: 30 mcg/min (12/01/24 0500)        Scheduled medications  aspirin, 81 mg, oral, Daily  atorvastatin, 80 mg, oral, Nightly  pantoprazole, 40 mg, oral, Daily before breakfast   Or  esomeprazole, 40 mg, nasoduodenal tube, Daily before breakfast    Or  pantoprazole, 40 mg, intravenous, Daily before breakfast  hydrALAZINE, 10 mg, oral, TID  insulin lispro, 0-5 Units, subcutaneous, TID AC  isosorbide dinitrate, 10 mg, oral, TID  metoprolol tartrate, 25 mg, oral, q6h        PRN medications  PRN medications: dextrose, dextrose, glucagon, glucagon, heparin, heparin, ipratropium-albuteroL, oxyCODONE      Assessment & Plan  Lukasz Rodrigues is a 69 y.o. male with a PMH of CAD s/p stent 20 years ago, hypertension, hyperlipidemia, DM2 (A1C 7.9% 6/2024) on 13h of CICU admission for NSTEMI.      Admitted to the OSH 11/29/2024 with complaint of mid sternal chest pain not relieved by nitroglycerine. TTE showed LVEF of 32% and abnormal left ventricular wall motion. L heart cath on 11/29, found severe, focal stenosis of the mid and distal LAD, mid RCA, and  proximal left Cx with left to left collaterals. He was transferred to the CICU for further evaluation and management.     Patient had recurrent chest pain 11/30 afternoon. Nitroglycerin restarted and uptitrated without significant improvement. IABP placed.    Updates 12/1  Patient had recurrent chest pain yesterday afternoon  Nitroglycerin restarted and uptitrated significant improvement  IABP placed overnight  Will place [place] Josephine today  Pending cardiac surgery recs  Holding HYD/ISDN      Neurologic  - AFSHAN     Cardiovascular  #NSTEMI  - Troponin peaked 8460, down to 3400  - Brilinta loaded on 11/29  - Coronary angiogram 11/29/2024: severe, focal stenosis of the mid and distal LAD, mid RCA, and  proximal left Cx with left to left collaterals  - Patient had recurrent chest pain 11/30 afternoon  - Nitroglycerin restarted, HYD/ISDN for afterload reduction   - IABP placed overnight  Plan:  Follow-up with CTS and interventional  C/w nitroglycerin gtt  C/w Hydralazine 10 TID  C/w Isordil 10 mg TID  C/w Heparin gtt   C/w Atorva 80 mg  C/w Aspirin 81 mg  Holding Brilinta  Holding ACEi/ARB     #HFrEF (32%, 11/29/24)  #Ischemic  Cardiomyopathy  - TTE 11/29: EF 32% with LV wall motion abnormalities  - No previous echo on file  Metoprolol tartrate up titrated to 37.5 mg q6h   Start rest of GDMT when appropriate     #HTN  - Home meds: coreg 3.125, losartan 25 (both on hold)     Respiratory  - AFSHAN     Gastrointestinal  - AFSHAN     Renal  #BRUNILDA  - Cr up to 2.48 today, baseline 1.23, 1.77 on admission  - Patient euvolemic on exam and CXR  - Possibly PAT (LHC + IABP insert)   Plan:  Will monitor  Urine electrolytes  Avoid nephrotoxins    Hematology  #Leucocytosis  - WBC 15.5 today (from 9.1), BRUNILDA  - Patient endorsed shivers in the morning   - No evident source of infection  - May be inflammatory reaction iso IABP insertion  Plan:  Blood cultures  Continue to monitor  Low threshold to start abx if concern     Endocrine  #T2DM  - On home metformin  - A1c 7.4  Plan:  On SSI  Plan for SGLT2i on discharge (HFrEF)    Fluids: PRN  Electrolytes: PRN  Nutrition: Adult diet Regular, Cardiac; 70 gm fat; 2 - 3 grams Sodium  Lines: PRN  DVT prophylaxis: on therapeutic AC w/ Heparin gtt  GI prophylaxis: Protonix    Code Status: Full Code (Confirmed on admission)   Emergency Contact / NOK: Extended Emergency Contact Information  Primary Emergency Contact: Daya Suazo  Mobile Phone: [phone]  Relation: Daughter   needed? No        Tee Junior MD, MSc  Internal Medicine PGY-1    12/1/2024

## 2024-12-01 NOTE — HOSPITAL COURSE
Lukasz Rodrigues is a 69 y.o. male with a PMH of CAD s/p stent 20 years ago, hypertension, hyperlipidemia, DM2 (A1C 7.9% 6/2024) on 13h of CICU admission for NSTEMI.      Admitted to the OSH 11/29/2024 with complaint of mid sternal chest pain not relieved by nitroglycerine. TTE showed LVEF of 32% and abnormal left ventricular wall motion. L heart cath on 11/29, found severe, focal stenosis of the mid and distal LAD, mid RCA, and  proximal left Cx with left to left collaterals. He was transferred to the CICU for further evaluation and management.     Started on nitroglycerin gtt later weaned, and CTS consulted for CABG evaluation. On 11/30 PM, Patient had recurrent chest pain. Nitroglycerin gtt restarted and uptitrated without significant improvement. IABP placed overnight. [place] Josephine catheter placed 12/1. CHIP meeting was held to discuss revascularization given difficult anatomy, for which pt was determined to be  candidate for CABG via sternotomy with likely three grafts. [place] Josephine placed 12/1 and then removed 12/3.     NM PET CT Scan myocardial perfusion scan was done which showed small perfusion defect in apical inferior wall, and Ventriculomegaly with global hypokinesis and ejection fraction of  38%. Due to small perfusion defect no FDG PET viability study was performed. CT scan of chest also done for pre-op eval.    The pt was then discharged and transferred to [facility] for further eval for revascularization.     Other issues to follow-up:   [   ] Will eventually need optimized GDMT in the setting of newly diagnosed HFrEF  [   ] Currently on Metop tartrate 37.5 q6h. Can consider up-titrating as needed.   [   ] Home Losartan on hold, can consider Entresto later  [   ] Spironolactone and SGLT2i prior to discharge

## 2024-12-01 NOTE — PROCEDURES
"Thermodilution PA Catheter    Date/Time: 12/1/2024 6:48 PM    Performed by: Ankur Dumas MD  Authorized by: Ankur Dumas MD  Consent: Verbal consent obtained. Written consent obtained.  Risks and benefits: risks, benefits and alternatives were discussed  Consent given by: patient  Patient understanding: patient states understanding of the procedure being performed  Patient consent: the patient's understanding of the procedure matches consent given  Procedure consent: procedure consent matches procedure scheduled  Relevant documents: relevant documents present and verified  Test results: test results available and properly labeled  Site marked: the operative site was marked  Imaging studies: imaging studies available  Required items: required blood products, implants, devices, and special equipment available  Patient identity confirmed: verbally with patient and arm band  Time out: Immediately prior to procedure a \"time out\" was called to verify the correct patient, procedure, equipment, support staff and site/side marked as required.  Preparation: Patient was prepped and draped in the usual sterile fashion.  Local anesthesia used: yes  Anesthesia: local infiltration    Anesthesia:  Local anesthesia used: yes  Local Anesthetic: lidocaine 1% with epinephrine  Anesthetic total: 3 mL    Sedation:  Patient sedated: no    Patient tolerance: patient tolerated the procedure well with no immediate complications            "

## 2024-12-01 NOTE — CARE PLAN
Problem: Pain - Adult  Goal: Verbalizes/displays adequate comfort level or baseline comfort level  12/1/2024 1731 by Venkat Gerardo RN  Outcome: Progressing  12/1/2024 1730 by Venkat Gerardo RN  Outcome: Progressing     Problem: Safety - Adult  Goal: Free from fall injury  12/1/2024 1731 by Venkat Gerardo RN  Outcome: Progressing  Flowsheets (Taken 12/1/2024 1731)  Free from fall injury:   Instruct family/caregiver on patient safety   Based on caregiver fall risk screen, instruct family/caregiver to ask for assistance with transferring infant if caregiver noted to have fall risk factors  12/1/2024 1730 by Venkat Gerardo RN  Outcome: Progressing     Problem: Discharge Planning  Goal: Discharge to home or other facility with appropriate resources  12/1/2024 1731 by Venkat Gerardo RN  Outcome: Progressing  Flowsheets (Taken 12/1/2024 1731)  Discharge to home or other facility with appropriate resources: Identify barriers to discharge with patient and caregiver  12/1/2024 1730 by Venkat Gerardo RN  Outcome: Progressing     Problem: Chronic Conditions and Co-morbidities  Goal: Patient's chronic conditions and co-morbidity symptoms are monitored and maintained or improved  12/1/2024 1731 by Venkat Gerardo RN  Outcome: Progressing  Flowsheets (Taken 12/1/2024 1731)  Care Plan - Patient's Chronic Conditions and Co-Morbidity Symptoms are Monitored and Maintained or Improved:   Monitor and assess patient's chronic conditions and comorbid symptoms for stability, deterioration, or improvement   Collaborate with multidisciplinary team to address chronic and comorbid conditions and prevent exacerbation or deterioration   Update acute care plan with appropriate goals if chronic or comorbid symptoms are exacerbated and prevent overall improvement and discharge  12/1/2024 1730 by Venkat Gerardo RN  Outcome: Progressing     Problem: Diabetes  Goal: Achieve decreasing blood glucose levels by end of shift  12/1/2024 1731 by Venkat DAVIS  RODDY Gerardo  Outcome: Progressing  12/1/2024 1730 by Venkat Gerardo RN  Outcome: Progressing  Goal: Increase stability of blood glucose readings by end of shift  12/1/2024 1731 by Venkat Gerardo RN  Outcome: Progressing  12/1/2024 1730 by Venkat Gerardo RN  Outcome: Progressing  Goal: Decrease in ketones present in urine by end of shift  12/1/2024 1731 by Venkat Gerardo RN  Outcome: Progressing  12/1/2024 1730 by Venkat Gerardo RN  Outcome: Progressing  Goal: Maintain electrolyte levels within acceptable range throughout shift  12/1/2024 1731 by Venkat Gerardo RN  Outcome: Progressing  12/1/2024 1730 by Venkat Gerardo RN  Outcome: Progressing  Goal: Maintain glucose levels >70mg/dl to <250mg/dl throughout shift  12/1/2024 1731 by Venkat Gerardo RN  Outcome: Progressing  12/1/2024 1730 by Venkat Gerardo RN  Outcome: Progressing  Goal: No changes in neurological exam by end of shift  12/1/2024 1731 by Venkat Gerardo RN  Outcome: Progressing  12/1/2024 1730 by Venkat Gerardo RN  Outcome: Progressing  Goal: Learn about and adhere to nutrition recommendations by end of shift  12/1/2024 1731 by Venkat Gerardo RN  Outcome: Progressing  12/1/2024 1730 by Venkat Gerardo RN  Outcome: Progressing  Goal: Vital signs within normal range for age by end of shift  12/1/2024 1731 by Venkat Gerardo RN  Outcome: Progressing  12/1/2024 1730 by Venkat Gerardo RN  Outcome: Progressing  Goal: Increase self care and/or family involovement by end of shift  12/1/2024 1731 by Venkat Gerardo RN  Outcome: Progressing  12/1/2024 1730 by Venkat Gerardo RN  Outcome: Progressing  Goal: Receive DSME education by end of shift  12/1/2024 1731 by Venkat Gerardo RN  Outcome: Progressing  12/1/2024 1730 by Venkat Gerardo RN  Outcome: Progressing     Problem: Skin  Goal: Decreased wound size/increased tissue granulation at next dressing change  12/1/2024 1731 by Venkat Gerardo RN  Outcome: Progressing  Flowsheets (Taken 12/1/2024 1731)  Decreased wound  size/increased tissue granulation at next dressing change:   Promote sleep for wound healing   Utilize specialty bed per algorithm   Protective dressings over bony prominences  12/1/2024 1730 by Venkat Gerardo RN  Outcome: Progressing  Goal: Participates in plan/prevention/treatment measures  12/1/2024 1731 by Venkat Gerardo RN  Outcome: Progressing  Flowsheets (Taken 12/1/2024 1731)  Participates in plan/prevention/treatment measures:   Discuss with provider PT/OT consult   Elevate heels  12/1/2024 1730 by Venkat Greardo RN  Outcome: Progressing  Goal: Prevent/manage excess moisture  12/1/2024 1731 by Venkat Gerardo RN  Outcome: Progressing  Flowsheets (Taken 12/1/2024 1731)  Prevent/manage excess moisture:   Cleanse incontinence/protect with barrier cream   Moisturize dry skin   Follow provider orders for dressing changes   Monitor for/manage infection if present  12/1/2024 1730 by Venkat Gerardo RN  Outcome: Progressing  Goal: Prevent/minimize sheer/friction injuries  12/1/2024 1731 by Venkat Gerardo RN  Outcome: Progressing  Flowsheets (Taken 12/1/2024 1731)  Prevent/minimize sheer/friction injuries:   Complete micro-shifts as needed if patient unable. Adjust patient position to relieve pressure points, not a full turn   Increase activity/out of bed for meals   Use pull sheet   HOB 30 degrees or less   Turn/reposition every 2 hours/use positioning/transfer devices  12/1/2024 1730 by Venkat Gerardo RN  Outcome: Progressing  Goal: Promote/optimize nutrition  12/1/2024 1731 by Venkat Gerardo RN  Outcome: Progressing  Flowsheets (Taken 12/1/2024 1731)  Promote/optimize nutrition: Discuss with provider if NPO > 2 days  12/1/2024 1730 by Venkat Gerardo RN  Outcome: Progressing  Goal: Promote skin healing  12/1/2024 1731 by Venkat Gerardo RN  Outcome: Progressing  12/1/2024 1730 by Venkat Gerardo RN  Outcome: Progressing

## 2024-12-01 NOTE — PROGRESS NOTES
"Lukasz Rodrigues is a 69 y.o. male on day 2 of admission presenting with NSTEMI (non-ST elevated myocardial infarction) (Multi).      Objective   Xray demonstrated IABP well positioned, 1:1, good augmentation. No sings of hematoma. No concerns for arterial complications with both legs with good perfusion and pulses.    Physical Exam  AO x 3, denies angina symptoms at this moment  CVS- normal s1, s2, IABP sounds  Resp -CTAB  Abd- Soft, NT, ND  Neuro  No gross motor. Sensory deficits   Groin access sites no hematoma, swelling   No LE edema     Last Recorded Vitals  Blood pressure 118/52, pulse 82, temperature 36 °C (96.8 °F), resp. rate 21, height 1.803 m (5' 11\"), weight 122 kg (268 lb), SpO2 95%.  Intake/Output last 3 Shifts:  I/O last 3 completed shifts:  In: 883.6 (7.3 mL/kg) [I.V.:883.6 (7.3 mL/kg)]  Out: 2070 (17 mL/kg) [Urine:2050 (0.5 mL/kg/hr); Blood:20]  Weight: 121.6 kg     Plan  - IC team will keep following up with this patient  - No changes at the balloon pump parameters by now.  I spent 30 minutes in the professional and overall care of this patient.      Ugo Walters MD    "

## 2024-12-01 NOTE — POST-PROCEDURE NOTE
Physician Transition of Care Summary  Invasive Cardiovascular Lab    Procedure Date: 11/30/2024  Attending:    * Stella Jameson - Primary  Resident/Fellow/Other Assistant: Surgeons and Role:     * Ugo Walters MD - Fellow    Indications:   Pre-op Diagnosis      * NSTEMI (non-ST elevated myocardial infarction) (Multi) [I21.4]    Post-procedure diagnosis:   Post-op Diagnosis     * NSTEMI (non-ST elevated myocardial infarction) (Multi) [I21.4]    Procedure(s):     * IABP Insertion    Description of the Procedure:   Access: Right CFA 10F  Positioned IABP 50cc without complications  Balloon with good augmentation 1:1  Doppler PT symmetrical bilaterally.      Complications:   none    Stents/Implants:   Implants       No implant documentation for this case.            Anticoagulation/Antiplatelet Plan:   Heparin     Estimated Blood Loss:   10 mL    Contrast  10 ml    Anesthesia: Moderate Sedation Anesthesia Staff: No anesthesia staff entered.    Any Specimen(s) Removed:   No specimens collected during this procedure.    Disposition:   Interventional cardiology team will follow up this patient until the end of the IABP support.      Electronically signed by: Ugo Walters MD, 11/30/2024 10:54 PM

## 2024-12-01 NOTE — CARE PLAN
Problem: Skin  Goal: Decreased wound size/increased tissue granulation at next dressing change  Outcome: Progressing  Flowsheets (Taken 12/1/2024 0151)  Decreased wound size/increased tissue granulation at next dressing change:   Promote sleep for wound healing   Protective dressings over bony prominences  Goal: Participates in plan/prevention/treatment measures  Outcome: Progressing  Flowsheets (Taken 12/1/2024 0151)  Participates in plan/prevention/treatment measures:   Elevate heels   Increase activity/out of bed for meals  Goal: Prevent/manage excess moisture  Outcome: Progressing  Flowsheets (Taken 12/1/2024 0151)  Prevent/manage excess moisture: Moisturize dry skin  Goal: Prevent/minimize sheer/friction injuries  Outcome: Progressing  Flowsheets (Taken 12/1/2024 0151)  Prevent/minimize sheer/friction injuries:   Increase activity/out of bed for meals   Utilize specialty bed per algorithm  Goal: Promote/optimize nutrition  Outcome: Progressing  Flowsheets (Taken 12/1/2024 0151)  Promote/optimize nutrition: Monitor/record intake including meals  Goal: Promote skin healing  Outcome: Progressing  Flowsheets (Taken 12/1/2024 0151)  Promote skin healing:   Protective dressings over bony prominences   Turn/reposition every 2 hours/use positioning/transfer devices

## 2024-12-01 NOTE — CONSULTS
Reason For Consult  Post-NSTEMI low EF 32%, ischemic cardiomyopathy     History Of Present Illness  Lukasz Rodrigues is a 69 y.o. male with PMHx of CAD s/p stent 20 years ago, hypertension, hyperlipidemia, DM2 (A1C 7.9% 6/2024), morbid obesity (BMI 37) admitted to the OSH 11/29/2024 with complaint of NSTEMI, HTN emergency and underwent LHC that revealed severe mvCAD and new diagnosis of ischemic cardiomyopathy with EF 32% . He was transferred to [facility] for further surgical and HF evaluation. Upon arrival, had intractable chest pain for which a IABP was placed. CT surgery has been involved to discuss possibility of CABG, final decision pending. HF service is consulted for newly diagnosed severe ischemic cardiomyopathy EF 32%.    Pt was seen at bedside with his partner. He reported he recently established with Dr. Lacy (10/31) for anginal symptoms and was prescribed prn SL nitroglycerin, Coreg 3.125 mg BID, losartan 25 mg, rosuva 10 mg. An TTE and LHC was also ordered but was not completed. On 11/29, pt developed severe CP that was not relieved by SL nitroglycerin, hence he called EMS and presented to [facility] where found to have SBP in 200s, rising troponin (23 >38>1778) along with non-specific ST and T wave abnormalities. Started on heparin and nitroglycerin gtt for NSTEMI. TTE showed EF 32%, significant WMA, normal RV function. LHC showed severe mvCAD (severe mid and distal LAD-small vessel,  proximal circumflex with collateralization, and 80% mid-RCA). Transferred to [facility] for further eval. He now has femoral IABP for intractable CP on arrival and says his CP is now better. Denies SOB, dizziness, focal deficits, N/V, abd pain or other acute issues. He has been seen by CT surgery for possible CABG and plan is to conduct a CHIP meeting on Monday 12/02.     Pt denies previous history of CHF, recurrent admissions to HF with CHF, orthopnea, LE edema, weight gain, abd distention before current hospitalization.       "  Past Medical History  He has a past medical history of Abscess (11/12/2018), Foot pain, bilateral (11/12/2018), and Other fatigue (11/12/2018).    Surgical History  He has no past surgical history on file.     Social History  He reports that he has never smoked. He has never used smokeless tobacco. He reports that he does not drink alcohol and does not use drugs.    Family History  No family history on file.     Allergies  Atorvastatin    Review of Systems  Per HPI     Physical Exam  Physical Exam  Constitutional:       General: He is not in acute distress.     Appearance: He is obese.   Eyes:      Extraocular Movements: Extraocular movements intact.      Conjunctiva/sclera: Conjunctivae normal.   Cardiovascular:      Rate and Rhythm: Normal rate and regular rhythm.      Comments: IABP mechanical sound present   Pulmonary:      Effort: Pulmonary effort is normal.      Breath sounds: Normal breath sounds.   Abdominal:      General: Abdomen is flat.      Palpations: Abdomen is soft.   Musculoskeletal:      Right lower leg: No edema.      Left lower leg: No edema.   Neurological:      General: No focal deficit present.      Mental Status: He is alert and oriented to person, place, and time. Mental status is at baseline.   Psychiatric:         Mood and Affect: Mood normal.         Behavior: Behavior normal.         Thought Content: Thought content normal.            Last Recorded Vitals  Blood pressure 118/52, pulse 79, temperature 36 °C (96.8 °F), resp. rate 18, height 1.803 m (5' 11\"), weight 122 kg (268 lb), SpO2 94%.    Cardiology testing    ECG 11/29:  Sinus rhythm with fusion complexes and PVC with aberrant conduction. Non-specific ST and T wave abnormality     TTE 11/29:  PHYSICIAN INTERPRETATION:  Left Ventricle: The left ventricular systolic function is moderately decreased, with a Mccartney's biplane calculated ejection fraction of 32%. Left venticular wall motion is abnormal. The left ventricular cavity size " is moderately dilated. There is normal septal and normal posterior left ventricular wall thickness. Spectral Doppler shows a Grade I (impaired relaxation pattern) of left ventricular diastolic filling with normal left atrial filling pressure. However, LV cavity size is normal, when indexed to body surface area.  LV Wall Scoring:  The apical septal segment, apical lateral segment, apical anterior segment, and  apex are akinetic. All remaining scored segments are normal.   Left Atrium: The left atrium is normal in size.  Right Ventricle: The right ventricle is normal in size. There is normal right ventricular global systolic function.  Right Atrium: The right atrium is normal in size.  Aortic Valve: The aortic valve is trileaflet. The aortic valve dimensionless index is 0.66. There is no evidence of aortic valve regurgitation. The peak instantaneous gradient of the aortic valve is 5 mmHg. The mean gradient of the aortic valve is 3 mmHg.  Mitral Valve: The mitral valve is normal in structure. There is no evidence of mitral valve regurgitation.  Tricuspid Valve: The tricuspid valve is structurally normal. No evidence of tricuspid regurgitation.  Pulmonic Valve: The pulmonic valve is not well visualized. The pulmonic valve regurgitation was not well visualized.  Pericardium: There is no pericardial effusion noted.  Aorta: The aortic root was not well visualized.  In comparison to the previous echocardiogram(s): There are no prior studies on this patient for comparison purposes.        CONCLUSIONS:   1. Apical septal segment, apical lateral segment, apical anterior segment, and apex are abnormal.   2. The left ventricular systolic function is moderately decreased, with a Mccartney's biplane calculated ejection fraction of 32%.   3. Abnormal left venticular wall motion.   4. Spectral Doppler shows a Grade I (impaired relaxation pattern) of left ventricular diastolic filling with normal left atrial filling pressure.   5. Left  ventricular cavity size is moderately dilated.   6. However, LV cavity size is normal, when indexed to body surface area.   7. There is normal right ventricular global systolic function.     QUANTITATIVE DATA SUMMARY:     2D MEASUREMENTS:          Normal Ranges:  IVSd:            0.91 cm  (0.6-1.1cm)  LVPWd:           0.95 cm  (0.6-1.1cm)  LVIDd:           6.41 cm  (3.9-5.9cm)  LVIDs:           4.34 cm  LV Mass Index:   108 g/m2  LVEDV Index:     72 ml/m2  LV % FS          32.3 %        LA VOLUME:                    Normal Ranges:  LA Vol A4C:        27.8 ml    (22+/-6mL/m2)  LA Vol A2C:        60.8 ml  LA Vol BP:         44.7 ml  LA Vol Index A4C:  11.9 ml/m2  LA Vol Index A2C:  26.1 ml/m2  LA Vol Index BP:   19.2 ml/m2  LA Area A4C:       11.8 cm2  LA Area A2C:       19.1 cm2  LA Major Axis A4C: 4.3 cm  LA Major Axis A2C: 5.1 cm  LA Volume Index:   18.8 ml/m2  LA Vol A4C:        22.8 ml  LA Vol A2C:        59.2 ml  LA Vol Index BSA:  17.6 ml/m2        RA VOLUME BY A/L METHOD:          Normal Ranges:  RA Area A4C:             15.2 cm2        AORTA MEASUREMENTS:         Normal Ranges:  Ao Sinus, d:        3.50 cm (2.1-3.5cm)        LV SYSTOLIC FUNCTION BY 2D PLANIMETRY (MOD):                       Normal Ranges:  EF-A4C View:    27 % (>=55%)  EF-A2C View:    38 %  EF-Biplane:     32 %  LV EF Reported: 32 %        LV DIASTOLIC FUNCTION:             Normal Ranges:  MV Peak E:             0.62 m/s    (0.7-1.2 m/s)  MV Peak A:             1.15 m/s    (0.42-0.7 m/s)  E/A Ratio:             0.54        (1.0-2.2)  MV e'                  0.050 m/s   (>8.0)  MV lateral e'          0.07 m/s  MV medial e'           0.03 m/s  MV A Dur:              123.69 msec  E/e' Ratio:            12.48       (<8.0)        MITRAL VALVE:          Normal Ranges:  MV DT:        114 msec (150-240msec)        AORTIC VALVE:                     Normal Ranges:  AoV Vmax:                1.13 m/s (<=1.7m/s)  AoV Peak P.1 mmHg  (<20mmHg)  AoV Mean P.9 mmHg (1.7-11.5mmHg)  LVOT Max Trey:            0.86 m/s (<=1.1m/s)  AoV VTI:                 23.82 cm (18-25cm)  LVOT VTI:                15.82 cm  LVOT Diameter:           2.44 cm  (1.8-2.4cm)  AoV Area, VTI:           3.11 cm2 (2.5-5.5cm2)  AoV Area,Vmax:           3.56 cm2 (2.5-4.5cm2)  AoV Dimensionless Index: 0.66        RIGHT VENTRICLE:  RV Basal 3.30 cm  RV Mid   2.40 cm  RV Major 9.2 cm  TAPSE:   23.0 mm  RV s'    0.09 m/s        TRICUSPID VALVE/RVSP:          Normal Ranges:  Peak TR Velocity:     0.69 m/s  RV Syst Pressure:     5 mmHg   (< 30mmHg)  IVC Diam:             1.00 cm        PULMONIC VALVE:          Normal Ranges:  PV Max Trey:     0.8 m/s  (0.6-0.9m/s)  PV Max P.4 mmHg    [facility] :  CONCLUSIONS:   1. Ischemic cardiomyopathy.   2. Severe, focal stenosis of the mid and distal left anterior descending artery, mid right coronary artery, and chronically totally occluded proximal left circumflex with left to left collaterals.    Coronary Lesion Summary:  Vessel           Stenosis              Vessel Segment  LAD            80% stenosis             mid  LAD            80% stenosis             distal  Circumflex 100% stenosis         proximal  RCA            80% stenosis              mid    Relevant Results  Results for orders placed or performed during the hospital encounter of 24 (from the past 24 hours)   Heparin Assay, UFH   Result Value Ref Range    Heparin Unfractionated 0.4 See Comment Below for Therapeutic Ranges IU/mL   POCT GLUCOSE   Result Value Ref Range    POCT Glucose 236 (H) 74 - 99 mg/dL   Blood Culture    Specimen: Peripheral Venipuncture; Blood culture   Result Value Ref Range    Blood Culture Loaded on Instrument - Culture in progress    Blood Culture    Specimen: Peripheral Venipuncture; Blood culture   Result Value Ref Range    Blood Culture Loaded on Instrument - Culture in progress    Urinalysis with Reflex Culture and  Microscopic   Result Value Ref Range    Color, Urine Light-Yellow Light-Yellow, Yellow, Dark-Yellow    Appearance, Urine Clear Clear    Specific Gravity, Urine 1.021 1.005 - 1.035    pH, Urine 5.5 5.0, 5.5, 6.0, 6.5, 7.0, 7.5, 8.0    Protein, Urine 20 (TRACE) NEGATIVE, 10 (TRACE), 20 (TRACE) mg/dL    Glucose, Urine Normal Normal mg/dL    Blood, Urine NEGATIVE NEGATIVE    Ketones, Urine NEGATIVE NEGATIVE mg/dL    Bilirubin, Urine NEGATIVE NEGATIVE    Urobilinogen, Urine Normal Normal mg/dL    Nitrite, Urine NEGATIVE NEGATIVE    Leukocyte Esterase, Urine 75 Adryan/µL (A) NEGATIVE   Extra Urine Gray Tube   Result Value Ref Range    Extra Tube Hold for add-ons.    Microscopic Only, Urine   Result Value Ref Range    WBC, Urine 6-10 (A) 1-5, NONE /HPF    RBC, Urine NONE NONE, 1-2, 3-5 /HPF    Mucus, Urine FEW Reference range not established. /LPF   Urine electrolytes   Result Value Ref Range    Sodium, Urine Random 28 mmol/L    Sodium/Creatinine Ratio 15 Not established. mmol/g Creat    Potassium, Urine Random 66 mmol/L    Potassium/Creatinine Ratio 36 Not established mmol/g Creat    Chloride, Urine Random 18 mmol/L    Chloride/Creatinine Ratio 10 (L) 23 - 275 mmol/g creat    Creatinine, Urine Random 184.5 20.0 - 370.0 mg/dL   POCT GLUCOSE   Result Value Ref Range    POCT Glucose 179 (H) 74 - 99 mg/dL   Heparin Assay, UFH   Result Value Ref Range    Heparin Unfractionated 0.3 See Comment Below for Therapeutic Ranges IU/mL   BLOOD GAS MIXED VENOUS FULL PANEL   Result Value Ref Range    POCT pH, Mixed 7.40 7.33 - 7.43 pH    POCT pCO2, Mixed 43 41 - 51 mm Hg    POCT pO2, Mixed 41 35 - 45 mm Hg    POCT SO2, Mixed 68 45 - 75 %    POCT Oxy Hemoglobin, Mixed 66.3 45.0 - 75.0 %    POCT Hematocrit Calculated, Mixed 36.0 (L) 41.0 - 52.0 %    POCT Sodium, Mixed 130 (L) 136 - 145 mmol/L    POCT Potassium, Mixed 4.3 3.5 - 5.3 mmol/L    POCT Chloride, Mixed 101 98 - 107 mmol/L    POCT Ionized Calcium, Mixed 1.19 1.10 - 1.33 mmol/L    POCT  Glucose, Mixed 180 (H) 74 - 99 mg/dL    POCT Lactate, Mixed 0.9 0.4 - 2.0 mmol/L    POCT Base Excess, Mixed 1.5 -2.0 - 3.0 mmol/L    POCT HCO3 Calculated, Mixed 26.6 (H) 22.0 - 26.0 mmol/L    POCT Hemoglobin, Mixed 11.9 (L) 13.5 - 17.5 g/dL    POCT Anion Gap, Mixed 7 (L) 10 - 25 mmo/L    Patient Temperature 37.0 degrees Celsius    FiO2 21 %   CBC   Result Value Ref Range    WBC 9.4 4.4 - 11.3 x10*3/uL    nRBC 0.0 0.0 - 0.0 /100 WBCs    RBC 4.01 (L) 4.50 - 5.90 x10*6/uL    Hemoglobin 11.7 (L) 13.5 - 17.5 g/dL    Hematocrit 34.0 (L) 41.0 - 52.0 %    MCV 85 80 - 100 fL    MCH 29.2 26.0 - 34.0 pg    MCHC 34.4 32.0 - 36.0 g/dL    RDW 13.6 11.5 - 14.5 %    Platelets 154 150 - 450 x10*3/uL   Magnesium   Result Value Ref Range    Magnesium 2.08 1.60 - 2.40 mg/dL   Basic Metabolic Panel   Result Value Ref Range    Glucose 207 (H) 74 - 99 mg/dL    Sodium 136 136 - 145 mmol/L    Potassium 4.3 3.5 - 5.3 mmol/L    Chloride 103 98 - 107 mmol/L    Bicarbonate 24 21 - 32 mmol/L    Anion Gap 13 10 - 20 mmol/L    Urea Nitrogen 30 (H) 6 - 23 mg/dL    Creatinine 1.80 (H) 0.50 - 1.30 mg/dL    eGFR 41 (L) >60 mL/min/1.73m*2    Calcium 8.3 (L) 8.6 - 10.6 mg/dL   Hepatic Function Panel   Result Value Ref Range    Albumin 3.4 3.4 - 5.0 g/dL    Bilirubin, Total 0.6 0.0 - 1.2 mg/dL    Bilirubin, Direct 0.1 0.0 - 0.3 mg/dL    Alkaline Phosphatase 60 33 - 136 U/L    ALT 14 10 - 52 U/L    AST 17 9 - 39 U/L    Total Protein 5.5 (L) 6.4 - 8.2 g/dL   Heparin Assay, UFH   Result Value Ref Range    Heparin Unfractionated 0.3 See Comment Below for Therapeutic Ranges IU/mL   BLOOD GAS MIXED VENOUS FULL PANEL   Result Value Ref Range    POCT pH, Mixed 7.41 7.33 - 7.43 pH    POCT pCO2, Mixed 42 41 - 51 mm Hg    POCT pO2, Mixed 40 35 - 45 mm Hg    POCT SO2, Mixed 66 45 - 75 %    POCT Oxy Hemoglobin, Mixed 64.8 45.0 - 75.0 %    POCT Hematocrit Calculated, Mixed 37.0 (L) 41.0 - 52.0 %    POCT Sodium, Mixed 132 (L) 136 - 145 mmol/L    POCT Potassium, Mixed  4.7 3.5 - 5.3 mmol/L    POCT Chloride, Mixed 103 98 - 107 mmol/L    POCT Ionized Calcium, Mixed 1.21 1.10 - 1.33 mmol/L    POCT Glucose, Mixed 214 (H) 74 - 99 mg/dL    POCT Lactate, Mixed 0.8 0.4 - 2.0 mmol/L    POCT Base Excess, Mixed 1.7 -2.0 - 3.0 mmol/L    POCT HCO3 Calculated, Mixed 26.6 (H) 22.0 - 26.0 mmol/L    POCT Hemoglobin, Mixed 12.3 (L) 13.5 - 17.5 g/dL    POCT Anion Gap, Mixed 7 (L) 10 - 25 mmo/L    Patient Temperature 37.0 degrees Celsius    FiO2 21 %   POCT GLUCOSE   Result Value Ref Range    POCT Glucose 172 (H) 74 - 99 mg/dL     Cardiac Catheterization Procedure    Result Date: 12/1/2024   [facility], Cath Lab, [address] [place] [address] Cardiovascular Catheterization Report Patient Name:      YEVGENIY LEUNG      Performing Physician: [id number] Stella Jameson MD Study Date:        11/30/2024          Verifying Physician:  [id number] Stella Jameson MD MRN/PID:           [id number] Accession#:        [id number]        Ordering Provider:    [id number] [facility] Date of Birth/Age: 1955 / 69 years Cardiologist:         [id number] Alli Nur MD Gender:            M                   Fellow:               Ugo Walters MD Encounter#:        [id number]          Surgeon:              [id number] Pb Fernandez MD  Study: IABP - Intra-aortic Balloon Pump Insertion  Indications:  Procedure Description: After infiltration with 2% Lidocaine, the right femoral artery was cannulated with a modified Seldinger technique. Subsequently a 10 sheath was placed in the right femoral artery.  Procedure Description Comments: The patient had an Intra-aortic Balloon Pump (IABP)  inserted in the cath lab via the right femoral artery. The balloon timing was 1 to 1. Micropuncture ultrasound guided access approach.  Hemo Personnel: +----------------+---------+ Name            Duty      +----------------+---------+ Carroll Jameson MD 1 +----------------+---------+  Hemodynamic Pressures:  +----+----------------------+----------+-------------+--------------+---------+ Site      Date Time       Phase NameSystolic mmHgDiastolic mmHgMean mmHg +----+----------------------+----------+-------------+--------------+---------+   AO11/30/2024 10:20:55 PM      Rest           87            43       56 +----+----------------------+----------+-------------+--------------+---------+  Art11/30/2024 10:20:55 PM      Rest          110            56       70 +----+----------------------+----------+-------------+--------------+---------+  Cardiac Cath Post Procedure Notes: Post Procedure Diagnosis: Successful IABP placement. Blood Loss:               Estimated blood loss during the procedure was 10 mls. Specimens Removed:        Number of specimen(s) removed: none.  Recommendations: [facility]. Follow recommendations per [facility]Cardiac surgery team. ____________________________________________________________________________________ CONCLUSIONS:  1. CAD. 3VD. NSTEMI. Cardiogenic shock  2. Successful placement of 50cc IABP.  3. No complications during the procedure. ICD 10 Codes: Angina pectoris, unspecified-I20.9  CPT Codes: Intra aortic Balloon Pump Placement (percutaneous)-72929  [id number] Stella Jameson MD Performing Physician Electronically signed by [id number] Stella Jameson MD on 12/1/2024 at 5:51:42 PM  ** Final **     XR chest 1 view    Result Date: 12/1/2024  Interpreted By:  Greg Camara,  and Beatriz Menjivar STUDY: XR CHEST 1 VIEW;  12/1/2024 4:03 pm   INDICATION: Signs/Symptoms:assess positioning of swan josh catheter.   COMPARISON:  Single-view chest 12/01/2024   ACCESSION NUMBER(S): [id number]   ORDERING CLINICIAN: JOSE FELDMAN   FINDINGS: AP radiograph of the chest was provided.   Right IJ Hilbert-Josephine catheter tip overlies the right main pulmonary artery. Intra-aortic balloon pump catheter in place with the radiodense tip overlying the aortic knob.   CARDIOMEDIASTINAL SILHOUETTE: Cardiomediastinal silhouette is normal in size and configuration.   LUNGS: Mild perihilar prominence and bibasilar atelectasis. No pneumothorax.   ABDOMEN: No remarkable upper abdominal findings.   BONES: No acute osseous changes.       1.  Right IJ Hilbert-Josephine catheter tip overlies the right main pulmonary artery. Intra-aortic balloon pump catheter tip overlies the aortic knob. 2. Similar mild interstitial edema and basilar atelectasis.   I personally reviewed the image(s)/study and resident interpretation. I agree with the findings as stated by resident Otto Henderson. Data analyzed and images interpreted at [facility], [place], [place].   MACRO: None   Signed by: Greg Camara 12/1/2024 4:17 PM Dictation workstation:   [id number]    XR chest 1 view    Result Date: 12/1/2024  Interpreted By:  Po Arguello, STUDY: XR CHEST 1 VIEW; 12/1/2024 9:14 am   INDICATION: Signs/Symptoms:IABP.   COMPARISON: 11/29/2024.   ACCESSION NUMBER(S): [id number]   ORDERING CLINICIAN: JOSE FELDMAN   FINDINGS:     CARDIOMEDIASTINAL SILHOUETTE: Intra-aortic balloon pump overlies the aortic knob.   LUNGS: There is mild perihilar interstitial prominence but no focal airspace disease. No pneumothorax.   ABDOMEN: No remarkable upper abdominal findings.   BONES: No acute osseous changes.       1.  Intra-aortic balloon pump overlies the aortic knob. Correlate with mild interstitial edema.     Signed by: Po Arguello 12/1/2024 10:01 AM Dictation workstation:   [id number]        Assessment/Plan   Lukasz Rodrigues is a 69 y.o. male with PMHx of CAD s/p stent 20 years  ago, hypertension, hyperlipidemia, DM2 (A1C 7.9% 6/2024), morbid obesity (BMI 37) admitted to the OSH 11/29/2024 with complaint of NSTEMI, HTN emergency and underwent LHC that revealed severe mvCAD and new diagnosis of ischemic cardiomyopathy with EF 32% . He was transferred to [facility] for further surgical and HF evaluation. Upon arrival, had intractable chest pain for which a IABP was placed. CT surgery has been involved to discuss possibility of CABG, final decision pending. HF service is consulted for newly diagnosed severe ischemic cardiomyopathy EF 32%.    # Ischemic cardiomyopathy, EF 32%, AHA stage C cardiomyopathy  # NSTEMI/ mvCAD, intractable CP s/p femoral IABP   # BRUNILDA  # Morbid obesity (BMI 37)  - Currently undergoing CABG eval by CT surgery, CHIP meeting planned for 12/02  - Plan to insert a PA catheter in CICU today  - New diagnosis of ischemic cardiomyopathy, EF 32%, normal RV function. Though previously on BB and losartan but never full GDMT.     Recs:  - will follow [facility] numbers and provide further recs.  - Will follow CHIP meeting outcomes.   - In cristina-OP period, will avoid ARNi, SGLT-2 initiation.   - Can use hydral/nitroglycerin/nipride for afterload reduction.   -  In terms of advanced therapies: The patient’s candidacy for advanced heart failure therapies hinges on resolution of his current BRUNILDA. He has no absolute contraindications to OHT/LVAD but I would note that his clinical presentation does not clearly align with a Stage D heart failure phenotype, and he may experience meaningful improvement with GDMT. If CABG is pursued with a backup strategy involving LVAD or OHT, it is important to note that his BMI (37.38 kg/m²) is a relative contraindication to OHT, and his current LVEF (>25%) precludes LVAD consideration under standard criteria.     Plan discussed with Dr. Rodriguez, HF attending    Rboson Fung MD, MS, FACP   Heart Failure Fellow,  [facility]

## 2024-12-01 NOTE — PROGRESS NOTES
HFICU Attending Note    Primary Cardiologist: Ruth Ann    69M from [place], [place] with a history of HTN, HLD, Type II DM, obesity (BMI 37), non-smoker recently with crescendo angina found to have NSTEMI with severe mvCAD (severe mid and distal LAD-small vessel,  proximal circumflex with collateralization, and 80% mid-RCA) and HFrEF (EF 32%) as well as BRUNILDA, transferred from [facility] for CTS eval and IABP placed for ongoing angina. Surgical, interventional and HF opinions sought.     Principal Problem:    NSTEMI (non-ST elevated myocardial infarction) (Multi)    Plan:   - Will follow RHC numbers as these will prove quite helpful in staging degree of CM  - monitor renal recovery  - In terms of advanced therapies: The patient’s candidacy for advanced heart failure therapies hinges on resolution of his current BRUNILDA. He has no absolute contraindications to OHT/LVAD but I would note that his clinical presentation does not clearly align with a Stage D heart failure phenotype, and he may experience meaningful improvement with GDMT. If CABG is pursued with a backup strategy involving LVAD or OHT, it is important to note that his BMI (37.38 kg/m²) is a relative contraindication to OHT, and his current LVEF (>25%) precludes LVAD consideration under standard criteria.     This critically ill patient continues to be at-risk for clinically significant deterioration / failure due to the above mentioned dysfunctional, unstable organ systems.  I have personally identified and managed all complex critical care issues to prevent aforementioned clinical deterioration.  Critical care time is spent at bedside and/or the immediate area and has included, but is not limited to, the review of diagnostic tests, labs, radiographs, serial assessments of hemodynamics, respiratory status, ventilatory management, and family updates.  Time spent in procedures and teaching are reported separately.    Critical care time: __35__ minutes     Objective     Lukasz Hesspastor/[id number]  Admit Date: 11/29/2024  Hospital Length of Stay: 2   ICU Length of Stay: 1d 15h     MEDICATIONS  Infusions:  heparin, Last Rate: 1,700 Units/hr (12/01/24 1000)  heparin, Last Rate: 1,700 Units/hr (12/01/24 0930)  nitroglycerin, Last Rate: 50 mcg/min (12/01/24 0930)      Scheduled:  aspirin, 81 mg, Daily  pantoprazole, 40 mg, Daily before breakfast   Or  esomeprazole, 40 mg, Daily before breakfast   Or  pantoprazole, 40 mg, Daily before breakfast  insulin lispro, 0-5 Units, TID AC  magnesium sulfate, 2 g, Once  metoprolol tartrate, 37.5 mg, q6h  rosuvastatin, 20 mg, Nightly      PRN:  dextrose, 12.5 g, q15 min PRN  dextrose, 25 g, q15 min PRN  glucagon, 1 mg, q15 min PRN  glucagon, 1 mg, q15 min PRN  heparin, 2,000-4,000 Units, q4h PRN  heparin, 2,000-4,000 Units, q4h PRN  ipratropium-albuteroL, 3 mL, q6h PRN  oxyCODONE, 5 mg, q6h PRN        Prior to Admission Meds:  Medications Prior to Admission   Medication Sig Dispense Refill Last Dose/Taking    albuterol (Proventil HFA) 90 mcg/actuation inhaler Inhale 2 puffs every 4 hours if needed for wheezing, shortness of breath or other (cough). 18 g 0     aspirin 81 mg EC tablet Take 1 tablet (81 mg) by mouth once daily.       carvedilol (Coreg) 3.125 mg tablet Take 1 tablet (3.125 mg) by mouth 2 times a day. 180 tablet 3     losartan (Cozaar) 25 mg tablet Take 1 tablet (25 mg) by mouth once daily. 90 tablet 3     nitroglycerin (Nitrostat) 0.4 mg SL tablet Place 1 tablet (0.4 mg) under the tongue every 5 minutes if needed for chest pain. Call 911 if third dose is used or chest pain persists. 25 tablet 3     rosuvastatin (Crestor) 10 mg tablet Take 1 tablet (10 mg) by mouth once daily at bedtime. 90 tablet 3        Invasive Hemodynamics:    Most Recent Range Past 24hrs   BP (Art) 85/49 Arterial Line BP 1  Min: 85/49  Max: 139/70   MAP(Art) 65 mmHg Arterial Line MAP 1 (mmHg)   Min: 59 mmHg  Max: 90 mmHg   RA/CVP   No data recorded   PA   No data  "recorded   PA(mean)   No data recorded   PCWP   No data recorded   CO   No data recorded   CI   No data recorded   Mixed Venous   No data recorded   SVR    No data recorded   PVR   No data recorded     MCS:   Heart Mate III:     Most Recent Range Past 24hrs   Flow   No data recorded   Speed   No data recorded   Power   No data recorded   PI   No data recorded     ECMO:     Most Recent Range Past 24hrs   Flow   No data recorded   Speed   No data recorded   Sweep   No data recorded     Impella:      Most Recent Range Past 24hrs   Performance Level   No data recorded   Flow (L/min)   No data recorded   Motor Current   No data recorded   Placement Signal    Placement OK could not be evaluated. This SmartLink does not work with rows of the type: Custom List   Purge (mmHg)   No data recorded   Purge rate (mL/hr)   No data recorded     VENT:    Most Recent Range Past 24hrs   Mode      FiO2   No data recorded   Rate   No data recorded   Vt    No data recorded   PEEP   No data recorded         12/1/2024     9:00 AM 12/1/2024     8:00 AM 12/1/2024     7:00 AM 12/1/2024     6:00 AM 12/1/2024     5:00 AM 12/1/2024     4:00 AM 12/1/2024     3:13 AM   Vitals   BP Location      Left arm    Heart Rate 87 87 88 90 87 88 88   Temp      36.7 °C (98.1 °F)    Resp 20 18 19 17 20 19 20     Visit Vitals  /52   Pulse 87   Temp 36.7 °C (98.1 °F) (Temporal)   Resp 20   Ht 1.803 m (5' 11\")   Wt 122 kg (268 lb)   SpO2 92%   BMI 37.38 kg/m²   Smoking Status Never   BSA 2.47 m²     Wt Readings from Last 5 Encounters:   12/01/24 122 kg (268 lb)   11/29/24 115 kg (253 lb 8.5 oz)   10/31/24 114 kg (250 lb 8 oz)   06/24/24 117 kg (258 lb 8 oz)   10/17/23 120 kg (265 lb 8 oz)       Intake/Output Summary (Last 24 hours) at 12/1/2024 1033  Last data filed at 12/1/2024 1000  Gross per 24 hour   Intake 938.58 ml   Output 870 ml   Net 68.58 ml     CHEST: Unlabored, Clear  CV:  Normal sinus rhythm  ABD:  Soft, Nondistended Soft, No guarding, " Nontender Bowel sounds: All quadrants, Flatus: Yes  EXT:   RLE: Appropriate for ethnicity,Warm, Dry  DP: Doppler  PT: Doppler  LLE: Appropriate for ethnicity,Warm, Dry  DP: Doppler  PT: Doppler  NEURO:   RASS: Alert and calm  CAM: Negative  LOC: Alert  Cognition: Appropriate judgement  GCS: 15    DATA:  CMP:  Recent Labs     12/01/24  0619 12/01/24  0133 11/30/24  1700 11/30/24  0541 11/29/24 2006 11/29/24  1158 11/29/24  0257 09/28/23  0814   * 134* 134* 136 138 136 136 136   K 4.5 4.8 4.9 4.3 4.2 4.0 4.4 4.4    103 101 105 105 105 99 103   CO2 25 21 28 21 22 22 27 23   ANIONGAP 14 15 10 14 15 13 14 14   BUN 29* 28* 26* 25* 25* 27* 29* 24*   CREATININE 2.40* 2.48* 1.54* 1.39* 1.60* 1.36* 1.77* 1.23   EGFR 29* 28* 49* 56* 47* 57* 42*  --    MG  --  1.79 1.95 2.29 1.78 1.63 1.80  --      Recent Labs     12/01/24  0619 11/30/24  1700 11/30/24  0541 11/29/24 2006 11/29/24  1158 11/29/24  0257 09/28/23  0814   ALBUMIN 3.6 3.7 3.6 3.9 3.8 4.4 4.1   ALT  --   --   --  25  --  26 25   AST  --   --   --  42*  --  19 18   BILITOT  --   --   --  0.5  --  0.4 0.4     CBC:  Recent Labs     12/01/24  0133 11/30/24  0541 11/29/24 2006 11/29/24  0826 11/29/24 0257 09/28/23  0814   WBC 15.5* 9.1 11.8* 9.2 10.6 10.4   HGB 12.4* 14.0 14.7 14.8 16.2 14.6   HCT 37.4* 41.7 43.7 44.0 48.1 46.0    203 224 203 252 228   MCV 86 86 86 85 86 91     COAG:   Recent Labs     12/01/24  0949 12/01/24  0257 11/30/24  2350 11/30/24  2159 11/30/24  1708 11/30/24  0633 11/29/24  2351 11/29/24  1158 11/29/24  0826 11/29/24  0310   INR  --   --   --   --   --   --  1.0  --  1.0 1.0   HAUF 0.4 0.8 >2.0* 0.4 0.2   < > <0.1   < > 0.7  --     < > = values in this interval not displayed.     ABO:   Recent Labs     11/29/24 2006   ABO O     HEME/ENDO:   Recent Labs     11/29/24  0354 11/29/24  0257 06/24/24  1043 09/19/23  1651   TSH 2.18  --   --   --    HGBA1C  --  7.4* 7.9* 8.0*     CARDIAC:   Recent Labs     11/30/24  1833  "11/30/24  0225 11/29/24  2351 11/29/24  0826 11/29/24  0354 11/29/24  0257   TROPHS 3,403* 7,722* 8,460* 1,778* 38* 23*   BNP  --   --   --   --   --  72     Recent Labs     11/30/24 2025   LACTATEART 1.4     No results for input(s): \"TACROLIMUS\", \"SIROLIMUS\", \"CYCLOSPORINE\" in the last 17311 hours.  Recent Labs     11/29/24  0354 09/28/23  0814   CHOL 121 188   LDLF  --  108*   LDLCALC 63  --    HDL 30.8 37.6*   TRIG 134 214*     MICRO: No results for input(s): \"ESR\", \"CRP\", \"PROCAL\" in the last 17285 hours.  No results found for the last 90 days.      LDA:  Arterial Line 11/30/24 Left Radial (Active)   Placement Date/Time: 11/30/24 (c) 2118   Orientation: Left  Location: Radial  Site Prep: Chlorhexidine   Local Anesthetic: None  Insertion attempts: 1   Number of days: 0       Intra Aortic Balloon Pump 8.0 Fr. (Active)   Placement Date/Time: 11/30/24 2223   Insertion Site: Right femoral  Sutured: Yes  Catheter Size: 8.0 Fr.  Verification by X-ray: (c) Other (Comment)   Number of days: 0     NUTRITION: Adult diet Regular, Cardiac; 70 gm fat; 2 - 3 grams Sodium  EMERGENCY CONTACT: Extended Emergency Contact Information  Primary Emergency Contact: Daya Suazo  Mobile Phone: [phone]  Relation: Daughter   needed? No  CODE STATUS: Full Code  DISPO: Discharge Planning  Living Arrangements: Spouse/significant other  Support Systems: Children  Assistance Needed: none  Type of Residence: Private residence  Home or Post Acute Services: In home services  Expected Discharge Disposition: Home  FOLLOWUP:   Future Appointments   Date Time Provider Department Center   12/3/2024  1:00 PM [facility] STRESS/ECHO LAB [id number] [facility]   12/18/2024 10:00 AM CLAUDIO Galloway-CNP [id number] [place]           "

## 2024-12-01 NOTE — SIGNIFICANT EVENT
12/01/24 1432   Pre-Procedure Checklist   Emergent Line Insertion No   Type of Line to be Placed Introducer  (PA Cath)   Consent Obtained Yes   Emergency Medication Necessary No   Patient Identified with 2 Independent Identifiers Yes   Review of Allergies, Anticoagulation, Relevant Labs, ECG/Telemetry Yes   Risks/Benefits/Alternatives Discussed with Patient/POA/Legal Representative Yes   Stop Sign on Door Yes   Time Out Performed Yes   Catheter Exchange No   Positioning and Preparation Checklist   All People, Including Patient, in the Room with Cap and Mask Yes   Fluoroscopy Used to Identify Vessel and Guide Insertion; Sterile Cover Used No   Ultrasound Used to Identify Vessel and Guide Insertion; Sterile Cover Used Yes   Full Barrier Precautions Followed (Mask, Cap, Gown, Gloves) Yes   Hands Washed Yes   Monitors Attached with Sound Alarms On Yes   Full Body Sterile Drape (Head-to-Toe) Used to Cover Patient Yes   [place] Position (For IJ and Subclavian) Yes   CHG Skin Prep Used and Allowed to Air Dry Prior to Skin Procedure Yes   Procedure Checklist   Blood Aspirated From All Lumens, All Ports Subsequently Flushed Yes   Catheter Caps Placed On All Lumens; Lumens Clamped Yes   Maintain Guidewire Control Throughout, Ensuring Guidewire Removal Yes   Maintain Sterile Field Throughout Insertion Yes   Catheter Secured Yes   Confirmatory Test of Venous Placement Longitudinal ultrasound   Post-Procedure Checklist   Date and Time Written on Dressing Yes   Sharp and Wire Count and Safe Disposal of all Sharps/Wires Yes   Sterile Dressing Applied Per Protocol Yes   X-ray Ordered or ECG Image Yes

## 2024-12-01 NOTE — PROCEDURES
Arterial Line Insertion    Date/Time: 11/30/2024 9:18 PM    Performed by: Rosa Maria Davalos MD  Authorized by: Rosa Maria Davalos MD    Consent:     Consent obtained:  Written    Consent given by:  Patient    Risks, benefits, and alternatives were discussed: yes      Risks discussed:  Bleeding and pain  Universal protocol:     Procedure explained and questions answered to patient or proxy's satisfaction: yes      Relevant documents present and verified: yes      Test results available: yes      Imaging studies available: yes      Required blood products, implants, devices, and special equipment available: yes      Site/side marked: yes      Immediately prior to procedure, a time out was called: yes      Patient identity confirmed:  Verbally with patient  Indications:     Indications: hemodynamic monitoring    Pre-procedure details:     Skin preparation:  Chlorhexidine  Sedation:     Sedation type:  None  Anesthesia:     Anesthesia method:  None  Procedure details:     Location:  L radial    Placement technique:  Ultrasound guided    Number of attempts:  1    Transducer: waveform confirmed    Post-procedure details:     Post-procedure:  Biopatch applied    Procedure completion:  Tolerated

## 2024-12-02 ENCOUNTER — APPOINTMENT (OUTPATIENT)
Dept: CARDIOLOGY | Facility: HOSPITAL | Age: 69
End: 2024-12-02
Payer: MEDICARE

## 2024-12-02 ENCOUNTER — APPOINTMENT (OUTPATIENT)
Dept: RADIOLOGY | Facility: HOSPITAL | Age: 69
End: 2024-12-02
Payer: MEDICARE

## 2024-12-02 LAB
ABO GROUP (TYPE) IN BLOOD: NORMAL
ALBUMIN SERPL BCP-MCNC: 3.4 G/DL (ref 3.4–5)
ALP SERPL-CCNC: 60 U/L (ref 33–136)
ALT SERPL W P-5'-P-CCNC: 14 U/L (ref 10–52)
ANION GAP BLDMV CALCULATED.4IONS-SCNC: 7 MMO/L (ref 10–25)
ANION GAP BLDMV CALCULATED.4IONS-SCNC: 9 MMO/L (ref 10–25)
ANION GAP BLDMV CALCULATED.4IONS-SCNC: 9 MMO/L (ref 10–25)
ANION GAP SERPL CALC-SCNC: 13 MMOL/L (ref 10–20)
ANTIBODY SCREEN: NORMAL
AST SERPL W P-5'-P-CCNC: 17 U/L (ref 9–39)
ATRIAL RATE: 82 BPM
ATRIAL RATE: 84 BPM
BACTERIA UR CULT: NO GROWTH
BASE EXCESS BLDMV CALC-SCNC: -1.1 MMOL/L (ref -2–3)
BASE EXCESS BLDMV CALC-SCNC: 0.5 MMOL/L (ref -2–3)
BASE EXCESS BLDMV CALC-SCNC: 1.7 MMOL/L (ref -2–3)
BASE EXCESS BLDMV CALC-SCNC: 1.8 MMOL/L (ref -2–3)
BILIRUB DIRECT SERPL-MCNC: 0.1 MG/DL (ref 0–0.3)
BILIRUB SERPL-MCNC: 0.6 MG/DL (ref 0–1.2)
BODY TEMPERATURE: 37 DEGREES CELSIUS
BUN SERPL-MCNC: 30 MG/DL (ref 6–23)
CA-I BLDMV-SCNC: 1.2 MMOL/L (ref 1.1–1.33)
CA-I BLDMV-SCNC: 1.21 MMOL/L (ref 1.1–1.33)
CA-I BLDMV-SCNC: 1.21 MMOL/L (ref 1.1–1.33)
CALCIUM SERPL-MCNC: 8.3 MG/DL (ref 8.6–10.6)
CHLORIDE BLD-SCNC: 102 MMOL/L (ref 98–107)
CHLORIDE BLD-SCNC: 102 MMOL/L (ref 98–107)
CHLORIDE BLD-SCNC: 103 MMOL/L (ref 98–107)
CHLORIDE SERPL-SCNC: 103 MMOL/L (ref 98–107)
CO2 SERPL-SCNC: 24 MMOL/L (ref 21–32)
CREAT SERPL-MCNC: 1.8 MG/DL (ref 0.5–1.3)
EGFRCR SERPLBLD CKD-EPI 2021: 41 ML/MIN/1.73M*2
ERYTHROCYTE [DISTWIDTH] IN BLOOD BY AUTOMATED COUNT: 13.6 % (ref 11.5–14.5)
GLUCOSE BLD MANUAL STRIP-MCNC: 161 MG/DL (ref 74–99)
GLUCOSE BLD MANUAL STRIP-MCNC: 172 MG/DL (ref 74–99)
GLUCOSE BLD MANUAL STRIP-MCNC: 183 MG/DL (ref 74–99)
GLUCOSE BLD MANUAL STRIP-MCNC: 198 MG/DL (ref 74–99)
GLUCOSE BLD MANUAL STRIP-MCNC: 227 MG/DL (ref 74–99)
GLUCOSE BLD-MCNC: 191 MG/DL (ref 74–99)
GLUCOSE BLD-MCNC: 214 MG/DL (ref 74–99)
GLUCOSE BLD-MCNC: 252 MG/DL (ref 74–99)
GLUCOSE SERPL-MCNC: 207 MG/DL (ref 74–99)
HCO3 BLDMV-SCNC: 24.3 MMOL/L (ref 22–26)
HCO3 BLDMV-SCNC: 25.4 MMOL/L (ref 22–26)
HCO3 BLDMV-SCNC: 25.7 MMOL/L (ref 22–26)
HCO3 BLDMV-SCNC: 26.6 MMOL/L (ref 22–26)
HCT VFR BLD AUTO: 34 % (ref 41–52)
HCT VFR BLD EST: 36 % (ref 41–52)
HCT VFR BLD EST: 37 % (ref 41–52)
HCT VFR BLD EST: 37 % (ref 41–52)
HGB BLD-MCNC: 11.7 G/DL (ref 13.5–17.5)
HGB BLDMV-MCNC: 12 G/DL (ref 13.5–17.5)
HGB BLDMV-MCNC: 12.2 G/DL (ref 13.5–17.5)
HGB BLDMV-MCNC: 12.3 G/DL (ref 13.5–17.5)
INHALED O2 CONCENTRATION: 21 %
LACTATE BLDMV-SCNC: 0.8 MMOL/L (ref 0.4–2)
LACTATE BLDMV-SCNC: 0.8 MMOL/L (ref 0.4–2)
LACTATE BLDMV-SCNC: 1.1 MMOL/L (ref 0.4–2)
MAGNESIUM SERPL-MCNC: 2.08 MG/DL (ref 1.6–2.4)
MCH RBC QN AUTO: 29.2 PG (ref 26–34)
MCHC RBC AUTO-ENTMCNC: 34.4 G/DL (ref 32–36)
MCV RBC AUTO: 85 FL (ref 80–100)
NRBC BLD-RTO: 0 /100 WBCS (ref 0–0)
OXYHGB MFR BLDMV: 64.8 % (ref 45–75)
OXYHGB MFR BLDMV: 66.1 % (ref 45–75)
OXYHGB MFR BLDMV: 66.5 % (ref 45–75)
OXYHGB MFR BLDMV: 67.8 % (ref 45–75)
P AXIS: 123 DEGREES
P AXIS: 60 DEGREES
P OFFSET: 157 MS
P ONSET: 106 MS
PCO2 BLDMV: 37 MM HG (ref 41–51)
PCO2 BLDMV: 41 MM HG (ref 41–51)
PCO2 BLDMV: 42 MM HG (ref 41–51)
PCO2 BLDMV: 42 MM HG (ref 41–51)
PH BLDMV: 7.37 PH (ref 7.33–7.43)
PH BLDMV: 7.4 PH (ref 7.33–7.43)
PH BLDMV: 7.41 PH (ref 7.33–7.43)
PH BLDMV: 7.45 PH (ref 7.33–7.43)
PLATELET # BLD AUTO: 154 X10*3/UL (ref 150–450)
PO2 BLDMV: 39 MM HG (ref 35–45)
PO2 BLDMV: 39 MM HG (ref 35–45)
PO2 BLDMV: 40 MM HG (ref 35–45)
PO2 BLDMV: 43 MM HG (ref 35–45)
POTASSIUM BLDMV-SCNC: 4.6 MMOL/L (ref 3.5–5.3)
POTASSIUM BLDMV-SCNC: 4.6 MMOL/L (ref 3.5–5.3)
POTASSIUM BLDMV-SCNC: 4.7 MMOL/L (ref 3.5–5.3)
POTASSIUM SERPL-SCNC: 4.3 MMOL/L (ref 3.5–5.3)
PR INTERVAL: 214 MS
PR INTERVAL: 216 MS
PROT SERPL-MCNC: 5.5 G/DL (ref 6.4–8.2)
Q ONSET: 213 MS
Q ONSET: 214 MS
QRS COUNT: 14 BEATS
QRS COUNT: 14 BEATS
QRS DURATION: 108 MS
QRS DURATION: 116 MS
QT INTERVAL: 382 MS
QT INTERVAL: 408 MS
QTC CALCULATION(BAZETT): 451 MS
QTC CALCULATION(BAZETT): 476 MS
QTC FREDERICIA: 427 MS
QTC FREDERICIA: 452 MS
R AXIS: 176 DEGREES
R AXIS: 27 DEGREES
RBC # BLD AUTO: 4.01 X10*6/UL (ref 4.5–5.9)
RH FACTOR (ANTIGEN D): NORMAL
SAO2 % BLDMV: 66 % (ref 45–75)
SAO2 % BLDMV: 67 % (ref 45–75)
SAO2 % BLDMV: 68 % (ref 45–75)
SAO2 % BLDMV: 70 % (ref 45–75)
SODIUM BLDMV-SCNC: 132 MMOL/L (ref 136–145)
SODIUM SERPL-SCNC: 136 MMOL/L (ref 136–145)
T AXIS: 115 DEGREES
T AXIS: 70 DEGREES
T OFFSET: 404 MS
T OFFSET: 418 MS
UFH PPP CHRO-ACNC: 0.3 IU/ML
VENTRICULAR RATE: 82 BPM
VENTRICULAR RATE: 84 BPM
WBC # BLD AUTO: 9.4 X10*3/UL (ref 4.4–11.3)

## 2024-12-02 PROCEDURE — 85027 COMPLETE CBC AUTOMATED: CPT

## 2024-12-02 PROCEDURE — 2500000002 HC RX 250 W HCPCS SELF ADMINISTERED DRUGS (ALT 637 FOR MEDICARE OP, ALT 636 FOR OP/ED)

## 2024-12-02 PROCEDURE — 93005 ELECTROCARDIOGRAM TRACING: CPT

## 2024-12-02 PROCEDURE — 2500000001 HC RX 250 WO HCPCS SELF ADMINISTERED DRUGS (ALT 637 FOR MEDICARE OP)

## 2024-12-02 PROCEDURE — 84132 ASSAY OF SERUM POTASSIUM: CPT

## 2024-12-02 PROCEDURE — 2500000004 HC RX 250 GENERAL PHARMACY W/ HCPCS (ALT 636 FOR OP/ED)

## 2024-12-02 PROCEDURE — 82947 ASSAY GLUCOSE BLOOD QUANT: CPT

## 2024-12-02 PROCEDURE — 83735 ASSAY OF MAGNESIUM: CPT

## 2024-12-02 PROCEDURE — 37799 UNLISTED PX VASCULAR SURGERY: CPT | Performed by: INTERNAL MEDICINE

## 2024-12-02 PROCEDURE — 99291 CRITICAL CARE FIRST HOUR: CPT

## 2024-12-02 PROCEDURE — 85520 HEPARIN ASSAY: CPT

## 2024-12-02 PROCEDURE — 1100000001 HC PRIVATE ROOM DAILY

## 2024-12-02 PROCEDURE — 86900 BLOOD TYPING SEROLOGIC ABO: CPT

## 2024-12-02 PROCEDURE — 71045 X-RAY EXAM CHEST 1 VIEW: CPT | Performed by: RADIOLOGY

## 2024-12-02 PROCEDURE — 80053 COMPREHEN METABOLIC PANEL: CPT

## 2024-12-02 PROCEDURE — 82248 BILIRUBIN DIRECT: CPT

## 2024-12-02 PROCEDURE — 82810 BLOOD GASES O2 SAT ONLY: CPT | Performed by: STUDENT IN AN ORGANIZED HEALTH CARE EDUCATION/TRAINING PROGRAM

## 2024-12-02 PROCEDURE — 71045 X-RAY EXAM CHEST 1 VIEW: CPT

## 2024-12-02 PROCEDURE — 84132 ASSAY OF SERUM POTASSIUM: CPT | Performed by: STUDENT IN AN ORGANIZED HEALTH CARE EDUCATION/TRAINING PROGRAM

## 2024-12-02 PROCEDURE — 99291 CRITICAL CARE FIRST HOUR: CPT | Performed by: NURSE PRACTITIONER

## 2024-12-02 PROCEDURE — 84132 ASSAY OF SERUM POTASSIUM: CPT | Performed by: INTERNAL MEDICINE

## 2024-12-02 PROCEDURE — 93010 ELECTROCARDIOGRAM REPORT: CPT | Performed by: INTERNAL MEDICINE

## 2024-12-02 PROCEDURE — 37799 UNLISTED PX VASCULAR SURGERY: CPT

## 2024-12-02 RX ORDER — HYDRALAZINE HYDROCHLORIDE 10 MG/1
10 TABLET, FILM COATED ORAL 3 TIMES DAILY
Status: DISCONTINUED | OUTPATIENT
Start: 2024-12-02 | End: 2024-12-04

## 2024-12-02 RX ORDER — POLYETHYLENE GLYCOL 3350 17 G/17G
17 POWDER, FOR SOLUTION ORAL DAILY
Status: DISCONTINUED | OUTPATIENT
Start: 2024-12-02 | End: 2024-12-06 | Stop reason: HOSPADM

## 2024-12-02 SDOH — ECONOMIC STABILITY: FOOD INSECURITY: WITHIN THE PAST 12 MONTHS, THE FOOD YOU BOUGHT JUST DIDN'T LAST AND YOU DIDN'T HAVE MONEY TO GET MORE.: NEVER TRUE

## 2024-12-02 SDOH — ECONOMIC STABILITY: FOOD INSECURITY: HOW HARD IS IT FOR YOU TO PAY FOR THE VERY BASICS LIKE FOOD, HOUSING, MEDICAL CARE, AND HEATING?: NOT VERY HARD

## 2024-12-02 SDOH — ECONOMIC STABILITY: HOUSING INSECURITY: AT ANY TIME IN THE PAST 12 MONTHS, WERE YOU HOMELESS OR LIVING IN A SHELTER (INCLUDING NOW)?: NO

## 2024-12-02 SDOH — SOCIAL STABILITY: SOCIAL INSECURITY: WITHIN THE LAST YEAR, HAVE YOU BEEN AFRAID OF YOUR PARTNER OR EX-PARTNER?: NO

## 2024-12-02 SDOH — ECONOMIC STABILITY: TRANSPORTATION INSECURITY: IN THE PAST 12 MONTHS, HAS LACK OF TRANSPORTATION KEPT YOU FROM MEDICAL APPOINTMENTS OR FROM GETTING MEDICATIONS?: NO

## 2024-12-02 SDOH — ECONOMIC STABILITY: INCOME INSECURITY: IN THE PAST 12 MONTHS HAS THE ELECTRIC, GAS, OIL, OR WATER COMPANY THREATENED TO SHUT OFF SERVICES IN YOUR HOME?: NO

## 2024-12-02 SDOH — ECONOMIC STABILITY: HOUSING INSECURITY: IN THE PAST 12 MONTHS, HOW MANY TIMES HAVE YOU MOVED WHERE YOU WERE LIVING?: 0

## 2024-12-02 SDOH — ECONOMIC STABILITY: FOOD INSECURITY: WITHIN THE PAST 12 MONTHS, YOU WORRIED THAT YOUR FOOD WOULD RUN OUT BEFORE YOU GOT THE MONEY TO BUY MORE.: NEVER TRUE

## 2024-12-02 SDOH — SOCIAL STABILITY: SOCIAL INSECURITY: WITHIN THE LAST YEAR, HAVE YOU BEEN HUMILIATED OR EMOTIONALLY ABUSED IN OTHER WAYS BY YOUR PARTNER OR EX-PARTNER?: NO

## 2024-12-02 SDOH — ECONOMIC STABILITY: HOUSING INSECURITY: IN THE LAST 12 MONTHS, WAS THERE A TIME WHEN YOU WERE NOT ABLE TO PAY THE MORTGAGE OR RENT ON TIME?: NO

## 2024-12-02 ASSESSMENT — PAIN - FUNCTIONAL ASSESSMENT
PAIN_FUNCTIONAL_ASSESSMENT: 0-10

## 2024-12-02 ASSESSMENT — PAIN SCALES - GENERAL
PAINLEVEL_OUTOF10: 0 - NO PAIN
PAINLEVEL_OUTOF10: 5 - MODERATE PAIN
PAINLEVEL_OUTOF10: 0 - NO PAIN
PAINLEVEL_OUTOF10: 3
PAINLEVEL_OUTOF10: 0 - NO PAIN
PAINLEVEL_OUTOF10: 3
PAINLEVEL_OUTOF10: 4

## 2024-12-02 ASSESSMENT — PAIN DESCRIPTION - LOCATION: LOCATION: HEAD

## 2024-12-02 ASSESSMENT — ACTIVITIES OF DAILY LIVING (ADL): LACK_OF_TRANSPORTATION: NO

## 2024-12-02 NOTE — SIGNIFICANT EVENT
[facility] MEETING   Date:12/2/2024    Patient: Lukasz Rodrigues  MRN: [id number]  Referring: Dr. Nur         HPI: 69-year-old male with a past medical history of coronary disease status post remote stenting 20 years ago, hypertension, hyperlipidemia, T2DM who was initially admitted to an outside hospital 11/29/2024 with a chief complaint of midsternal chest pain and a peak troponin of 1778.  Transthoracic echocardiogram demonstrated LVEF of 32%.  Patient underwent cardiac catheterization 11/29/2024 that demonstrated severe multivessel coronary artery disease and he was transferred to [facility] for CABG evaluation.  IABP was placed on 11/30/2024. Pt has a BRUNILDA as well.     Physicians and Surgeons in attendance: Emelia Parmar, Jim, Violetta, Ayana, .     After discussion, we propose the following strategy: Viability study.  Given that the patient has a intra-aortic balloon pump, we recommend a PET viability study.  We can then consider percutaneous revascularization based on which territories of the left ventricle have viability.    Need for rediscussion within 30 days:  Y

## 2024-12-02 NOTE — PROGRESS NOTES
Subjective Data:  Patient feeling well today, no CP or SOB. IABP in stable position, RLE warm and well perfused with +doppler pulses.     Overnight Events:    None     Objective Data:  Last Recorded Vitals:  Vitals:    12/02/24 1300 12/02/24 1400 12/02/24 1500 12/02/24 1600   BP:       Pulse: 71 72 74 72   Resp: 16 19 17 20   Temp:    36.9 °C (98.4 °F)   TempSrc:    Temporal   SpO2: 93% 98% 96% 97%   Weight:       Height:           Last Labs:  CBC - 12/2/2024:  3:04 AM  9.4 11.7 154    34.0      CMP - 12/2/2024:  3:04 AM  8.3 5.5 17 --- 0.6   5.1 3.4 14 60      PTT - 11/29/2024: 11:51 PM  1.0   11.5 31     TROPHS   Date/Time Value Ref Range Status   11/30/2024 06:33 PM 3,403 0 - 53 ng/L Final     Comment:     Previous result verified on 11/30/2024 0117 on specimen/case [id number] called with component TRPHS for procedure Troponin I, High Sensitivity with value 8,460 ng/L.   11/30/2024 02:25 AM 7,722 0 - 53 ng/L Final     Comment:     Previous result verified on 11/30/2024 0117 on specimen/case [id number] called with component TRPHS for procedure Troponin I, High Sensitivity with value 8,460 ng/L.   11/29/2024 11:51 PM 8,460 0 - 53 ng/L Final   11/29/2024 08:26 AM 1,778 0 - 20 ng/L Final   11/29/2024 03:54 AM 38 0 - 20 ng/L Final   11/29/2024 02:57 AM 23 0 - 20 ng/L Final     BNP   Date/Time Value Ref Range Status   11/29/2024 02:57 AM 72 0 - 99 pg/mL Final     HGBA1C   Date/Time Value Ref Range Status   11/29/2024 02:57 AM 7.4 See comment % Final   06/24/2024 10:43 AM 7.9 4.2 - 6.5 % Final   09/19/2023 04:51 PM 8.0 4.2 - 6.5 % Final     LDLCALC   Date/Time Value Ref Range Status   11/29/2024 03:54 AM 63 <=99 mg/dL Final     Comment:                                 Near   Borderline      AGE      Desirable  Optimal    High     High     Very High     0-19 Y     0 - 109     ---    110-129   >/= 130     ----    20-24 Y     0 - 119     ---    120-159   >/= 160     ----      >24 Y     0 -  99   100-129  130-159    160-189     >/=190       VLDL   Date/Time Value Ref Range Status   11/29/2024 03:54 AM 27 0 - 40 mg/dL Final   09/28/2023 08:14 AM 43 0 - 40 mg/dL Final      Last I/O:  I/O last 3 completed shifts:  In: 2034.7 (16.7 mL/kg) [P.O.:770; I.V.:1264.7 (10.4 mL/kg)]  Out: 2315 (19 mL/kg) [Urine:2295 (0.5 mL/kg/hr); Blood:20]  Weight: 121.6 kg     Past Cardiology Tests (Last 3 Years):  EKG:  ECG 12 Lead 11/30/2024 (Preliminary)      Electrocardiogram 12 Lead 11/29/2024 (Preliminary)      Electrocardiogram, 12-lead PRN ACS symptoms 11/29/2024 (Preliminary)      ECG 12 lead 11/29/2024 (Preliminary)      ECG 12 lead (Clinic Performed) 10/31/2024    Echo:  Transthoracic Echo (TTE) Complete 11/29/2024    Ejection Fractions:  EF   Date/Time Value Ref Range Status   11/29/2024 08:19 AM 32 %      Cath:  Cardiac Catheterization Procedure 11/30/2024      Cardiac Catheterization Procedure 11/29/2024    Stress Test:  No results found for this or any previous visit from the past 1095 days.    Cardiac Imaging:  No results found for this or any previous visit from the past 1095 days.      Inpatient Medications:  Scheduled medications   Medication Dose Route Frequency    aspirin  81 mg oral Daily    hydrALAZINE  10 mg oral TID    insulin lispro  0-5 Units subcutaneous TID AC    metoprolol tartrate  37.5 mg oral q6h    pantoprazole  40 mg oral Daily before breakfast    rosuvastatin  20 mg oral Nightly     PRN medications   Medication    acetaminophen    Or    acetaminophen    Or    acetaminophen    dextrose    dextrose    glucagon    glucagon    heparin    ipratropium-albuteroL    oxyCODONE     Continuous Medications   Medication Dose Last Rate    heparin  0-4,000 Units/hr 1,700 Units/hr (12/02/24 1600)    nitroglycerin  0-200 mcg/min 55 mcg/min (12/02/24 1659)       Physical Exam:  General: NAD, lying in bed  Skin: warm and dry   Head/ neck: no JVD seen at 90 degrees  Cardiac: RRR, S1, S2   Pulm: CTAB, room air   Extremities: no LE edema.  Right groin with IABP, soft, nontender, with RLE warm and +doppler DP pulse   Neuro: no focal neuro deficits   Psych: appropriate mood and behavior       Assessment/Plan   Lukasz Rodrigues is a 69 y.o. year old male patient with PMHx of CAD s/p stent 20 years ago, hypertension, hyperlipidemia, DM2 (A1C 7.9% 6/2024) admitted to the OSH 11/29/2024 with complaint of mid sternal chest pain not relieved by nitroglycerin at [facility].     11/29 patient developed CP not relieved with at home SL nitroglycerin, called EMS, EKG unchanged. Trop initially flat but uptrended to 1778. TTE showed EF 32%, taken for [facility] and found severe, focal stenosis of the mid and distal left anterior descending artery, mid right coronary artery, and chronically totally occluded proximal left circumflex with left to left collaterals. Patient to be transferred to [facility] for cardiac MRI and evaluation for CABG.     11/30 IABP placed by Dr Jameson at [facility]     12/2 currently waiting CHIP meeting discussion regarding revascularization; IABP stable position, RLE warm with +doppler pulses     Recommendations:  - daily CXR with IABP; 12/2 stable position on CXR  - 12/2 IABP currently 1:1, assisted /64 (94), aug 123  - please maintain strict bedrest while IABP in place  - closely monitor groin insertion site and distal pulses  - May elevate HOB no greater than 30 degrees  - May log roll patient side to side without flexing involved extremity    Code Status:  Full Code    I spent 30 minutes in the professional and overall care of this patient.    Interventional Cardiology will follow.      CICU Fellow Pager: [phone] anytime  Endovascular /Limb Salvage Team Pager: [phone] for day coverage (8am-5pm)  Interventional Cardiology Fellow Pager: [phone] (7AM-5PM) Night coverage: [facility] Cross Cover [id number]  Night coverage: [facility] [id number]     Jossy Hope, APRN-CNP

## 2024-12-02 NOTE — PROGRESS NOTES
Cardiac ICU Progress Note, 12/2/2024    Admit Date: 11/29/2024   Hospital Length of Stay: 3   ICU Length of Stay: 2d 12h     History of Present Illness  Lukasz Rodrigues is a 69 y.o. male on day 2d 12h of admission for NSTEMI (non-ST elevated myocardial infarction) (Multi).    Interval History  Nitroglycerin restarted and uptitrated, now with improvement  Winona Josephine placed overnight   Urgent CHIP meeting scheduled for today - pending cardiac surgery recs, though pt wishes to be transferred to [facility]  Heart failure consulted yesterday     Subjective  Patient seen and evaluated at bedside with significant other present at bedside.     Patient is alert and oriented. Endorses soreness over R neck where Winona Josephine catheter was placed ON.    Otherwise denied chest pain, shortness of breath, palpitations, cough, orthopnea, palpitations, abd pain, N/V/D/C, pain elsewhere or new LE edema    This morning pt and significant other stating that they are looking to be transferred to [facility] as that was their first choice.     Objective    Vitals  Visit Vitals  /52   Pulse 74   Temp 36.3 °C (97.3 °F) (Temporal)   Resp 20        Invasive Hemodynamics   Most Recent Range Past 24hrs   BP (Art) 125/65 Arterial Line BP 1  Min: 85/49  Max: 133/69   MAP(Art) 89 mmHg Arterial Line MAP 1 (mmHg)   Min: 65 mmHg  Max: 93 mmHg     I/O    Intake/Output Summary (Last 24 hours) at 12/2/2024 0725  Last data filed at 12/2/2024 0600  Gross per 24 hour   Intake 1428 ml   Output 1945 ml   Net -517 ml        Physical Exam  Physical Exam  Constitutional:       General: He is awake.   HENT:      Head: Normocephalic and atraumatic.   Neck:      Comments: RIJ catheter in place without evidence of drainage, erythema or soreness to touch   Cardiovascular:      Rate and Rhythm: Normal rate and regular rhythm.      Arteriovenous access: Left arteriovenous access is present.  Pulmonary:      Effort: Pulmonary effort is normal.      Breath sounds: Normal breath  sounds.   Abdominal:      Palpations: Abdomen is soft.   Genitourinary:     Comments: Right femoral IABP insertion site: No bleeding, swelling, hematoma, or tenderness.  Neurological:      Mental Status: He is alert and oriented to person, place, and time.   Psychiatric:         Behavior: Behavior is cooperative.     Labs    CBC:  Results from last 7 days   Lab Units 12/02/24  0304 12/01/24  0133 11/30/24  0541   WBC AUTO x10*3/uL 9.4 15.5* 9.1   HEMOGLOBIN g/dL 11.7* 12.4* 14.0   PLATELETS AUTO x10*3/uL 154 186 203     BMP:  Results from last 7 days   Lab Units 12/02/24  0304 12/01/24  0619 12/01/24  0133 11/30/24  1700 11/30/24  0541   SODIUM mmol/L 136 135* 134* 134* 136   POTASSIUM mmol/L 4.3 4.5 4.8 4.9 4.3   CHLORIDE mmol/L 103 101 103 101 105   CO2 mmol/L 24 25 21 28 21   ANION GAP mmol/L 13 14 15 10 14   BUN mg/dL 30* 29* 28* 26* 25*   CREATININE mg/dL 1.80* 2.40* 2.48* 1.54* 1.39*   EGFR mL/min/1.73m*2 41* 29* 28* 49* 56*   CALCIUM mg/dL 8.3* 8.6 8.3* 9.0 8.8   PHOSPHORUS mg/dL  --  5.1*  --  4.3 4.2   MAGNESIUM mg/dL 2.08  --  1.79 1.95 2.29   GLUCOSE mg/dL 207* 184* 215* 168* 171*     LFT:  Results from last 7 days   Lab Units 12/02/24  0304 11/29/24 2006 11/29/24  0257   AST U/L 17 42* 19   ALT U/L 14 25 26   ALK PHOS U/L 60 76 83   BILIRUBIN TOTAL mg/dL 0.6 0.5 0.4   BILIRUBIN DIRECT mg/dL 0.1 0.1  --      Cardiac:  Results from last 7 days   Lab Units 11/30/24  1833 11/30/24  0225 11/29/24  2351 11/29/24  0354 11/29/24  0257   TROPHS   --   --   --    < > 23*   TROPHSCMC ng/L 3,403* 7,722* 8,460*  --   --    BNP pg/mL  --   --   --   --  72    < > = values in this interval not displayed.     Coag:  Results from last 7 days   Lab Units 11/29/24  2351 11/29/24  0826 11/29/24  0310   PROTIME seconds 11.5 11.6 11.0   APTT seconds 31 193*  --    INR  1.0 1.0 1.0     UA:   Results from last 7 days   Lab Units 12/01/24  1211   COLOR U  Light-Yellow   PH U  5.5   SPEC GRAV UR  1.021   PROTEIN U mg/dL 20  (TRACE)   BLOOD UR  NEGATIVE   NITRITE U  NEGATIVE   WBC UR /HPF 6-10*     ABG:  Results from last 7 days   Lab Units 12/02/24  0522 12/01/24  1535 11/30/24 2025   POCT PH, ARTERIAL pH  --   --  7.45*   POCT PO2, ARTERIAL mm Hg  --   --  77*   POCT PCO2, ARTERIAL mm Hg  --   --  37*   FIO2 % 21 21 28     VBG:  Results from last 7 days   Lab Units 11/29/24 2006   POCT PH, VENOUS pH 7.45*   POCT PCO2, VENOUS mm Hg 35*       Cardiac Data    EKG  Encounter Date: 10/31/24   ECG 12 lead (Clinic Performed)   Result Value    Ventricular Rate 80    Atrial Rate 80    NV Interval 180    QRS Duration 108    QT Interval 376    QTC Calculation(Bazett) 433    P Axis 59    R Axis 40    T Axis 112    QRS Count 13    Q Onset 215    P Onset 125    P Offset 159    T Offset 403    QTC Fredericia 414    Narrative    Sinus rhythm with frequent and consecutive Premature ventricular complexes  Nonspecific ST-T changes  Abnormal ECG  No previous ECGs available  Confirmed by [place] ([id number]) on 11/5/2024 5:06:24 PM      Transthoracic Echo (TTE) 11/29/2024  1. Apical septal segment, apical lateral segment, apical anterior segment, and apex are abnormal.  2. The left ventricular systolic function is moderately decreased, with a Mccartney's biplane calculated ejection fraction of 32%.  3. Abnormal left venticular wall motion.  4. Spectral Doppler shows a Grade I (impaired relaxation pattern) of left ventricular diastolic filling with normal left atrial filling pressure.  5. Left ventricular cavity size is moderately dilated.  6. However, LV cavity size is normal, when indexed to body surface area.  7. There is normal right ventricular global systolic function.     Coronary Angiogram 11/29/2024  1. Ischemic cardiomyopathy.  2. Severe, focal stenosis of the mid and distal left anterior descending artery, mid right coronary artery, and chronically totally occluded proximal left circumflex with left to left collaterals.  3. Guideline directed  medical therapy for ACS and heart failure.  4. Will transfer to [facility] for evaluation for bypass surgery.    Imaging  XR chest 1 view    Result Date: 11/29/2024  Hypoventilatory changes with bibasilar atelectasis. No consolidation.   MACRO: None   Signed by: Topher Coyle 11/29/2024 3:32 AM Dictation workstation:   [id number]       Inpatient Medications    Continuous medications  heparin, 0-4,000 Units/hr, Last Rate: 1,700 Units/hr (12/02/24 0600)  nitroglycerin, 0-200 mcg/min, Last Rate: 20 mcg/min (12/02/24 0600)        Scheduled medications  aspirin, 81 mg, oral, Daily  pantoprazole, 40 mg, oral, Daily before breakfast   Or  esomeprazole, 40 mg, nasoduodenal tube, Daily before breakfast   Or  pantoprazole, 40 mg, intravenous, Daily before breakfast  insulin lispro, 0-5 Units, subcutaneous, TID AC  metoprolol tartrate, 37.5 mg, oral, q6h  rosuvastatin, 20 mg, oral, Nightly        PRN medications  PRN medications: acetaminophen **OR** acetaminophen **OR** acetaminophen, dextrose, dextrose, glucagon, glucagon, heparin, ipratropium-albuteroL, oxyCODONE      Assessment & Plan  Lukasz Rodrigues is a 69 y.o. male with a PMH of CAD s/p stent 20 years ago, hypertension, hyperlipidemia, DM2 (A1C 7.4% 11/29/2024) on 13h of CICU admission for NSTEMI.      Admitted to the OSH 11/29/2024 with complaint of mid sternal chest pain not relieved by nitroglycerin. TTE showed LVEF of 32% and abnormal left ventricular wall motion. L heart cath on 11/29, found severe, focal stenosis of the mid and distal LAD, mid RCA, and  proximal left Cx with left to left collaterals. He was transferred to the CICU for further evaluation and management.     Nitroglycerin restarted and uptitrated with improvement and this AM does not endorse chest pain. IABP placed 11/30. Abingdon Josephine catheter placed ON on 12/1     Updates 12/2/24:  Abingdon Josephine placed ON   Last Abingdon numbers: CI 2.85, PAWP 14, CVP 13, SVO2 70%   Nitroglycerin  restarted and uptitrated with improvement in chest pain (denied chest pain this AM)  Added hydralazine 10mg TID this morning for BP control   IABP placed 12/1  Pending cardiac surgery recs  Holding HYD/ISDN  CHIP meeting to be held today   Pending transfer to [facility]. Images pushed over by Radiology and transfer requested initiated/received, but awaiting bed availability in CICU at [facility]      Neurologic  - AFSHAN     Cardiovascular  #NSTEMI  - Troponin peaked 8460, down to 3400  - Brilinta loaded on 11/29  - Coronary angiogram 11/29/2024: severe, focal stenosis of the mid and distal LAD, mid RCA, and  proximal left Cx with left to left collaterals  - Patient had recurrent chest pain 11/30 afternoon. Now relieved this AM  - Nitroglycerin continued restarted, HYD/ISDN for afterload reduction   - IABP placed overnight  Plan:  Follow-up with CTS and interventional  C/w nitroglycerin gtt  C/w Hydralazine 10 TID  C/w Isordil 10 mg TID  C/w Heparin gtt   C/w rosuvastatin 20mg daily   C/w Aspirin 81 mg daily   Holding Brilinta  Holding ACEi/ARB     #HFrEF (EF 32%, 11/29/24)  #Ischemic Cardiomyopathy  - TTE 11/29: EF 32% with LV wall motion abnormalities  - No previous echo on file  Plan:  Metoprolol tartrate up titrated to 37.5 mg q6h   Start rest of GDMT when appropriate     #HTN  - Home meds: coreg 3.125, losartan 25mg (both on hold)     Respiratory  - AFSHAN     Gastrointestinal  - AFSHAN     Renal  #BRUNILDA  - Cr up to 2.48 today, baseline 1.23, 1.77 on admission  - Patient euvolemic on exam and CXR  - Possibly PAT (LHC + IABP insert)   Plan:  Will monitor  Urine electrolytes  Avoid nephrotoxins    Hematology  #Leucocytosis  - WBC 15.5 today (from 9.1), BRUNILDA  - Patient endorsed shivers in the morning   - No evident source of infection  - May be inflammatory reaction iso IABP insertion  Plan:  Blood cultures  Continue to monitor  Low threshold to start abx if concern     Endocrine  #T2DM  - On home metformin  - A1c 7.4 on  Plan:  On  SSI  Plan for SGLT2i on discharge (HFrEF)    Fluids: PRN  Electrolytes: PRN  Nutrition: Adult diet Regular, Cardiac; 70 gm fat; 2 - 3 grams Sodium  Lines: PRN  DVT prophylaxis: on therapeutic AC w/ Heparin gtt  GI prophylaxis: Protonix    Code Status: Full Code (Confirmed on admission)   Emergency Contact / NOK: Extended Emergency Contact Information  Primary Emergency Contact: GabrieleDaya  Mobile Phone: [phone]  Relation: Daughter   needed? No        Sofiya Mccoy MD  Internal Medicine PGY-1    12/2/2024

## 2024-12-03 ENCOUNTER — APPOINTMENT (OUTPATIENT)
Dept: CARDIOLOGY | Facility: HOSPITAL | Age: 69
End: 2024-12-03
Payer: MEDICARE

## 2024-12-03 ENCOUNTER — APPOINTMENT (OUTPATIENT)
Dept: RADIOLOGY | Facility: HOSPITAL | Age: 69
End: 2024-12-03
Payer: MEDICARE

## 2024-12-03 ENCOUNTER — APPOINTMENT (OUTPATIENT)
Dept: CARDIOLOGY | Facility: CLINIC | Age: 69
End: 2024-12-03

## 2024-12-03 LAB
ALBUMIN SERPL BCP-MCNC: 3.5 G/DL (ref 3.4–5)
ALP SERPL-CCNC: 70 U/L (ref 33–136)
ALT SERPL W P-5'-P-CCNC: 14 U/L (ref 10–52)
ANION GAP BLDMV CALCULATED.4IONS-SCNC: 8 MMO/L (ref 10–25)
ANION GAP BLDMV CALCULATED.4IONS-SCNC: 8 MMO/L (ref 10–25)
ANION GAP SERPL CALC-SCNC: 11 MMOL/L (ref 10–20)
AST SERPL W P-5'-P-CCNC: 13 U/L (ref 9–39)
ATRIAL RATE: 72 BPM
ATRIAL RATE: 80 BPM
ATRIAL RATE: 84 BPM
BASE EXCESS BLDMV CALC-SCNC: -0.1 MMOL/L (ref -2–3)
BASE EXCESS BLDMV CALC-SCNC: 0.7 MMOL/L (ref -2–3)
BASOPHILS # BLD AUTO: 0.03 X10*3/UL (ref 0–0.1)
BASOPHILS NFR BLD AUTO: 0.3 %
BILIRUB DIRECT SERPL-MCNC: 0.1 MG/DL (ref 0–0.3)
BILIRUB SERPL-MCNC: 0.6 MG/DL (ref 0–1.2)
BODY TEMPERATURE: 37 DEGREES CELSIUS
BODY TEMPERATURE: 37 DEGREES CELSIUS
BUN SERPL-MCNC: 23 MG/DL (ref 6–23)
CA-I BLDMV-SCNC: 1.16 MMOL/L (ref 1.1–1.33)
CA-I BLDMV-SCNC: 1.19 MMOL/L (ref 1.1–1.33)
CALCIUM SERPL-MCNC: 8.6 MG/DL (ref 8.6–10.6)
CHLORIDE BLD-SCNC: 102 MMOL/L (ref 98–107)
CHLORIDE BLD-SCNC: 103 MMOL/L (ref 98–107)
CHLORIDE SERPL-SCNC: 102 MMOL/L (ref 98–107)
CO2 SERPL-SCNC: 25 MMOL/L (ref 21–32)
CREAT SERPL-MCNC: 1.36 MG/DL (ref 0.5–1.3)
EGFRCR SERPLBLD CKD-EPI 2021: 57 ML/MIN/1.73M*2
EOSINOPHIL # BLD AUTO: 0.43 X10*3/UL (ref 0–0.7)
EOSINOPHIL NFR BLD AUTO: 4.4 %
ERYTHROCYTE [DISTWIDTH] IN BLOOD BY AUTOMATED COUNT: 13.2 % (ref 11.5–14.5)
GLUCOSE BLD MANUAL STRIP-MCNC: 126 MG/DL (ref 74–99)
GLUCOSE BLD MANUAL STRIP-MCNC: 182 MG/DL (ref 74–99)
GLUCOSE BLD MANUAL STRIP-MCNC: 186 MG/DL (ref 74–99)
GLUCOSE BLD MANUAL STRIP-MCNC: 227 MG/DL (ref 74–99)
GLUCOSE BLD-MCNC: 222 MG/DL (ref 74–99)
GLUCOSE BLD-MCNC: 247 MG/DL (ref 74–99)
GLUCOSE SERPL-MCNC: 208 MG/DL (ref 74–99)
HCO3 BLDMV-SCNC: 23.9 MMOL/L (ref 22–26)
HCO3 BLDMV-SCNC: 25.4 MMOL/L (ref 22–26)
HCT VFR BLD AUTO: 35.5 % (ref 41–52)
HCT VFR BLD EST: 36 % (ref 41–52)
HCT VFR BLD EST: 40 % (ref 41–52)
HGB BLD-MCNC: 12 G/DL (ref 13.5–17.5)
HGB BLDMV-MCNC: 12.1 G/DL (ref 13.5–17.5)
HGB BLDMV-MCNC: 13.2 G/DL (ref 13.5–17.5)
IMM GRANULOCYTES # BLD AUTO: 0.05 X10*3/UL (ref 0–0.7)
IMM GRANULOCYTES NFR BLD AUTO: 0.5 % (ref 0–0.9)
INHALED O2 CONCENTRATION: 21 %
INHALED O2 CONCENTRATION: 21 %
LACTATE BLDMV-SCNC: 0.7 MMOL/L (ref 0.4–2)
LACTATE BLDMV-SCNC: 0.9 MMOL/L (ref 0.4–2)
LYMPHOCYTES # BLD AUTO: 1.98 X10*3/UL (ref 1.2–4.8)
LYMPHOCYTES NFR BLD AUTO: 20.2 %
MAGNESIUM SERPL-MCNC: 1.8 MG/DL (ref 1.6–2.4)
MCH RBC QN AUTO: 28.9 PG (ref 26–34)
MCHC RBC AUTO-ENTMCNC: 33.8 G/DL (ref 32–36)
MCV RBC AUTO: 86 FL (ref 80–100)
MONOCYTES # BLD AUTO: 1.03 X10*3/UL (ref 0.1–1)
MONOCYTES NFR BLD AUTO: 10.5 %
NEUTROPHILS # BLD AUTO: 6.28 X10*3/UL (ref 1.2–7.7)
NEUTROPHILS NFR BLD AUTO: 64.1 %
NRBC BLD-RTO: 0 /100 WBCS (ref 0–0)
OXYHGB MFR BLDMV: 65.4 % (ref 45–75)
OXYHGB MFR BLDMV: 68.7 % (ref 45–75)
P AXIS: 25 DEGREES
P AXIS: 58 DEGREES
P AXIS: 63 DEGREES
P OFFSET: 151 MS
P OFFSET: 160 MS
P OFFSET: 161 MS
P ONSET: 100 MS
P ONSET: 102 MS
P ONSET: 97 MS
PCO2 BLDMV: 36 MM HG (ref 41–51)
PCO2 BLDMV: 40 MM HG (ref 41–51)
PH BLDMV: 7.41 PH (ref 7.33–7.43)
PH BLDMV: 7.43 PH (ref 7.33–7.43)
PLATELET # BLD AUTO: 142 X10*3/UL (ref 150–450)
PO2 BLDMV: 38 MM HG (ref 35–45)
PO2 BLDMV: 39 MM HG (ref 35–45)
POTASSIUM BLDMV-SCNC: 4.2 MMOL/L (ref 3.5–5.3)
POTASSIUM BLDMV-SCNC: 4.6 MMOL/L (ref 3.5–5.3)
POTASSIUM SERPL-SCNC: 4.3 MMOL/L (ref 3.5–5.3)
PR INTERVAL: 144 MS
PR INTERVAL: 220 MS
PR INTERVAL: 230 MS
PROT SERPL-MCNC: 6.6 G/DL (ref 6.4–8.2)
Q ONSET: 212 MS
Q ONSET: 212 MS
Q ONSET: 214 MS
QRS COUNT: 12 BEATS
QRS COUNT: 14 BEATS
QRS COUNT: 14 BEATS
QRS DURATION: 114 MS
QRS DURATION: 116 MS
QRS DURATION: 118 MS
QT INTERVAL: 376 MS
QT INTERVAL: 416 MS
QT INTERVAL: 440 MS
QTC CALCULATION(BAZETT): 411 MS
QTC CALCULATION(BAZETT): 491 MS
QTC CALCULATION(BAZETT): 507 MS
QTC FREDERICIA: 399 MS
QTC FREDERICIA: 465 MS
QTC FREDERICIA: 484 MS
R AXIS: 24 DEGREES
R AXIS: 28 DEGREES
R AXIS: 31 DEGREES
RBC # BLD AUTO: 4.15 X10*6/UL (ref 4.5–5.9)
SAO2 % BLDMV: 67 % (ref 45–75)
SAO2 % BLDMV: 70 % (ref 45–75)
SODIUM BLDMV-SCNC: 131 MMOL/L (ref 136–145)
SODIUM BLDMV-SCNC: 131 MMOL/L (ref 136–145)
SODIUM SERPL-SCNC: 134 MMOL/L (ref 136–145)
T AXIS: 110 DEGREES
T AXIS: 131 DEGREES
T AXIS: 91 DEGREES
T OFFSET: 400 MS
T OFFSET: 422 MS
T OFFSET: 432 MS
UFH PPP CHRO-ACNC: 0.2 IU/ML
VENTRICULAR RATE: 72 BPM
VENTRICULAR RATE: 80 BPM
VENTRICULAR RATE: 84 BPM
WBC # BLD AUTO: 9.8 X10*3/UL (ref 4.4–11.3)

## 2024-12-03 PROCEDURE — 1100000001 HC PRIVATE ROOM DAILY

## 2024-12-03 PROCEDURE — 84075 ASSAY ALKALINE PHOSPHATASE: CPT

## 2024-12-03 PROCEDURE — 82947 ASSAY GLUCOSE BLOOD QUANT: CPT

## 2024-12-03 PROCEDURE — 2500000001 HC RX 250 WO HCPCS SELF ADMINISTERED DRUGS (ALT 637 FOR MEDICARE OP)

## 2024-12-03 PROCEDURE — 85025 COMPLETE CBC W/AUTO DIFF WBC: CPT

## 2024-12-03 PROCEDURE — 2500000002 HC RX 250 W HCPCS SELF ADMINISTERED DRUGS (ALT 637 FOR MEDICARE OP, ALT 636 FOR OP/ED)

## 2024-12-03 PROCEDURE — 71045 X-RAY EXAM CHEST 1 VIEW: CPT

## 2024-12-03 PROCEDURE — 85520 HEPARIN ASSAY: CPT

## 2024-12-03 PROCEDURE — 83735 ASSAY OF MAGNESIUM: CPT

## 2024-12-03 PROCEDURE — 71045 X-RAY EXAM CHEST 1 VIEW: CPT | Performed by: RADIOLOGY

## 2024-12-03 PROCEDURE — 2500000004 HC RX 250 GENERAL PHARMACY W/ HCPCS (ALT 636 FOR OP/ED)

## 2024-12-03 PROCEDURE — 84132 ASSAY OF SERUM POTASSIUM: CPT

## 2024-12-03 PROCEDURE — 37799 UNLISTED PX VASCULAR SURGERY: CPT

## 2024-12-03 PROCEDURE — 93005 ELECTROCARDIOGRAM TRACING: CPT

## 2024-12-03 PROCEDURE — 99291 CRITICAL CARE FIRST HOUR: CPT

## 2024-12-03 RX ORDER — ENOXAPARIN SODIUM 150 MG/ML
1 INJECTION SUBCUTANEOUS 2 TIMES DAILY
Status: DISCONTINUED | OUTPATIENT
Start: 2024-12-03 | End: 2024-12-04

## 2024-12-03 ASSESSMENT — PAIN SCALES - GENERAL
PAINLEVEL_OUTOF10: 0 - NO PAIN
PAINLEVEL_OUTOF10: 4
PAINLEVEL_OUTOF10: 0 - NO PAIN

## 2024-12-03 ASSESSMENT — PAIN - FUNCTIONAL ASSESSMENT
PAIN_FUNCTIONAL_ASSESSMENT: 0-10

## 2024-12-03 ASSESSMENT — PAIN DESCRIPTION - LOCATION: LOCATION: GENERALIZED

## 2024-12-03 NOTE — CONSULTS
69 yr/old male with severe multivessel CAD and severely reduced ejection fraction.  I was initially consulted for surgical evaluation over the weekend.  I spoke with the patient briefly about his surgical options and need for additional studies prior to any surgical intervention.  Later that day he developed worsening chest pain and had a balloon pump placed.  His chest pain has since been controlled.      I have attempted to follow up with him to discuss options/plans on multiple occasions however, each time he has sent me away because he has decided to be transferred to the [facility].  He is now on hospital day 4 with no immediate plans for transfer from the clinic but they have accepted him it seems.      The plan for him would be CABG via sternotomy with likely three grafts.  He has an occluded circumflex and its unclear if there is a quality target there.  If not he can get a LIMA to LAD and SVG to the PDA.  Will try for SVG to OM if possible.  His LAD is diffusely diseased with limited runoff.  Depending on how he does coming off bypass will likely place an axillary impella 5.5 to help support him during recovery.  My concern is that with such poor targets that he will be at high risk for post op complications related to cardiac dysfunction requiring a significant amount of support.    I have kept him on the schedule to be discussed at our high risk coronary meeting on Wednesday if he is still here. I am happy to discuss the plan and options with him if he would like.  I am concerned that he has now had the balloon pump for 4 days and continues to wait.  As time continues to pass he will become more weak and a higher risk candidate for surgery as he is not able to ambulate at this time due to the femoral balloon pump.  He could have been scheduled for OR today but declined.      Georgie Drew MD  Cardiac Surgery   12/03/24  1:54 PM

## 2024-12-03 NOTE — PROGRESS NOTES
Cardiac ICU Progress Note, 12/3/2024    Admit Date: 11/29/2024   Hospital Length of Stay: 4   ICU Length of Stay: 3d 13h     History of Present Illness  Lukasz Rodrigues is a 69 y.o. male on day 3d 13h of admission for NSTEMI (non-ST elevated myocardial infarction) (Multi).    Interval History  Nitroglycerin restarted and uptitrated, now with resolution in chest pain   CHIP meeting yesterday-had recommended viability study, and consider percutaneous revascularization based on which territories of the left ventricle have viability.   Pt seen by Cardiac Surgery and plan would be for CABG with likely 3 grafts though target is difficult given occluded Lcx.       Subjective  Patient seen and evaluated at bedside with significant other present at bedside.     Patient is alert and oriented. Denies soreness over R neck where Peshtigo Josephine catheter site.    Otherwise denied chest pain, shortness of breath, palpitations, cough, orthopnea, palpitations, abd pain, N/V/D/C, pain elsewhere or new LE edema    This morning pt and significant other stating that they are looking to be transferred to [facility] as that was their first choice.     Objective    Vitals  Visit Vitals  /52   Pulse 70   Temp 36.1 °C (97 °F) (Temporal)   Resp 18        Invasive Hemodynamics   Most Recent Range Past 24hrs   BP (Art) 159/73 Arterial Line BP 1  Min: 110/48  Max: 159/73   MAP(Art) 112 mmHg Arterial Line MAP 1 (mmHg)   Min: 73 mmHg  Max: 112 mmHg     I/O    Intake/Output Summary (Last 24 hours) at 12/3/2024 0803  Last data filed at 12/3/2024 0604  Gross per 24 hour   Intake 1198.53 ml   Output 2375 ml   Net -1176.47 ml        Physical Exam  Physical Exam  Constitutional:       General: He is awake. He is not in acute distress.     Appearance: He is obese.   HENT:      Head: Normocephalic and atraumatic.      Nose: Nose normal.   Neck:      Comments: RIJ catheter in place without evidence of drainage, erythema or soreness to touch   Cardiovascular:       Rate and Rhythm: Normal rate and regular rhythm.      Pulses: Normal pulses.      Heart sounds: Normal heart sounds.      Arteriovenous access: Left arteriovenous access is present.  Pulmonary:      Effort: Pulmonary effort is normal.      Breath sounds: Normal breath sounds.   Abdominal:      Palpations: Abdomen is soft.   Genitourinary:     Comments: Right femoral IABP insertion site: No bleeding, swelling, hematoma, or tenderness.  Musculoskeletal:         General: No swelling.      Right lower leg: No edema.      Left lower leg: No edema.   Skin:     General: Skin is warm and dry.   Neurological:      Mental Status: He is alert and oriented to person, place, and time.   Psychiatric:         Behavior: Behavior is cooperative.     Labs    CBC:  Results from last 7 days   Lab Units 12/03/24  0523 12/02/24  0304 12/01/24  0133   WBC AUTO x10*3/uL 9.8 9.4 15.5*   HEMOGLOBIN g/dL 12.0* 11.7* 12.4*   PLATELETS AUTO x10*3/uL 142* 154 186     BMP:  Results from last 7 days   Lab Units 12/03/24  0523 12/02/24  0304 12/01/24  0619 12/01/24  0133 11/30/24  1700 11/30/24  0541   SODIUM mmol/L 134* 136 135* 134* 134* 136   POTASSIUM mmol/L 4.3 4.3 4.5 4.8 4.9 4.3   CHLORIDE mmol/L 102 103 101 103 101 105   CO2 mmol/L 25 24 25 21 28 21   ANION GAP mmol/L 11 13 14 15 10 14   BUN mg/dL 23 30* 29* 28* 26* 25*   CREATININE mg/dL 1.36* 1.80* 2.40* 2.48* 1.54* 1.39*   EGFR mL/min/1.73m*2 57* 41* 29* 28* 49* 56*   CALCIUM mg/dL 8.6 8.3* 8.6 8.3* 9.0 8.8   PHOSPHORUS mg/dL  --   --  5.1*  --  4.3 4.2   MAGNESIUM mg/dL 1.80 2.08  --  1.79 1.95 2.29   GLUCOSE mg/dL 208* 207* 184* 215* 168* 171*     LFT:  Results from last 7 days   Lab Units 12/03/24  0523 12/02/24  0304 11/29/24 2006   AST U/L 13 17 42*   ALT U/L 14 14 25   ALK PHOS U/L 70 60 76   BILIRUBIN TOTAL mg/dL 0.6 0.6 0.5   BILIRUBIN DIRECT mg/dL 0.1 0.1 0.1     Cardiac:  Results from last 7 days   Lab Units 11/30/24  1833 11/30/24  0225 11/29/24  2351 11/29/24  0354  11/29/24  0257   TROPHS   --   --   --    < > 23*   TROPHSCMC ng/L 3,403* 7,722* 8,460*  --   --    BNP pg/mL  --   --   --   --  72    < > = values in this interval not displayed.     Coag:  Results from last 7 days   Lab Units 11/29/24  2351 11/29/24  0826 11/29/24  0310   PROTIME seconds 11.5 11.6 11.0   APTT seconds 31 193*  --    INR  1.0 1.0 1.0     UA:   Results from last 7 days   Lab Units 12/01/24  1211   COLOR U  Light-Yellow   PH U  5.5   SPEC GRAV UR  1.021   PROTEIN U mg/dL 20 (TRACE)   BLOOD UR  NEGATIVE   NITRITE U  NEGATIVE   WBC UR /HPF 6-10*     ABG:  Results from last 7 days   Lab Units 12/03/24  0522 12/02/24  1828 12/02/24  1244 12/01/24  1535 11/30/24 2025   POCT PH, ARTERIAL pH  --   --   --   --  7.45*   POCT PO2, ARTERIAL mm Hg  --   --   --   --  77*   POCT PCO2, ARTERIAL mm Hg  --   --   --   --  37*   FIO2 % 21 21 21   < > 28    < > = values in this interval not displayed.     VBG:  Results from last 7 days   Lab Units 11/29/24 2006   POCT PH, VENOUS pH 7.45*   POCT PCO2, VENOUS mm Hg 35*       Cardiac Data    EKG  Encounter Date: 11/29/24   ECG 12 Lead   Result Value    Ventricular Rate 80    Atrial Rate 80    DC Interval 220    QRS Duration 114    QT Interval 440    QTC Calculation(Bazett) 507    P Axis 58    R Axis 24    T Axis 131    QRS Count 14    Q Onset 212    P Onset 102    P Offset 161    T Offset 432    QTC Fredericia 484    Narrative    Sinus rhythm with 1st degree AV block with frequent Premature ventricular complexes  Anterior infarct , age undetermined  ST & T wave abnormality, consider lateral ischemia  Prolonged QT  Abnormal ECG  When compared with ECG of 30-NOV-2024 01:53,  Premature ventricular complexes are now Present  T wave inversion more evident in Anterior leads      Transthoracic Echo (TTE) 11/29/2024  1. Apical septal segment, apical lateral segment, apical anterior segment, and apex are abnormal.  2. The left ventricular systolic function is moderately  decreased, with a Mccartney's biplane calculated ejection fraction of 32%.  3. Abnormal left venticular wall motion.  4. Spectral Doppler shows a Grade I (impaired relaxation pattern) of left ventricular diastolic filling with normal left atrial filling pressure.  5. Left ventricular cavity size is moderately dilated.  6. However, LV cavity size is normal, when indexed to body surface area.  7. There is normal right ventricular global systolic function.     Coronary Angiogram 11/29/2024  1. Ischemic cardiomyopathy.  2. Severe, focal stenosis of the mid and distal left anterior descending artery, mid right coronary artery, and chronically totally occluded proximal left circumflex with left to left collaterals.  3. Guideline directed medical therapy for ACS and heart failure.  4. Will transfer to [facility] for evaluation for bypass surgery.    Imaging  XR chest 1 view    Result Date: 11/29/2024  Hypoventilatory changes with bibasilar atelectasis. No consolidation.   MACRO: None   Signed by: Topher Coyle 11/29/2024 3:32 AM Dictation workstation:   [id number]       Inpatient Medications    Continuous medications  heparin, 0-4,000 Units/hr, Last Rate: 1,700 Units/hr (12/03/24 0604)  nitroglycerin, 0-200 mcg/min, Last Rate: 35 mcg/min (12/03/24 0600)        Scheduled medications  aspirin, 81 mg, oral, Daily  hydrALAZINE, 10 mg, oral, TID  insulin lispro, 0-5 Units, subcutaneous, TID AC  metoprolol tartrate, 37.5 mg, oral, q6h  pantoprazole, 40 mg, oral, Daily before breakfast  polyethylene glycol, 17 g, oral, Daily  rosuvastatin, 20 mg, oral, Nightly        PRN medications  PRN medications: acetaminophen **OR** acetaminophen **OR** acetaminophen, dextrose, dextrose, glucagon, glucagon, heparin, ipratropium-albuteroL, oxyCODONE      Assessment & Plan  Lukasz Rodrigues is a 69 y.o. male with a PMH of CAD s/p stent 20 years ago, hypertension, hyperlipidemia, DM2 (A1C 7.4% 11/29/2024) on 13h of CICU admission  for NSTEMI.      Admitted to the OSH 11/29/2024 with complaint of mid sternal chest pain not relieved by nitroglycerin. TTE showed LVEF of 32% and abnormal left ventricular wall motion. L heart cath on 11/29, found severe, focal stenosis of the mid and distal LAD, mid RCA, and  proximal left Cx with left to left collaterals. He was transferred to the CICU for further evaluation and management.     Nitroglycerin restarted and uptitrated with improvement and this AM does not endorse chest pain. IABP placed 11/30. Huger Josephine catheter placed on 12/1     Updates 12/3/24:  Last set of Huger numbers at 12:14: CVP 4, PAP (mean) 16, CO 6.26, CI 2.62, PAP 21/13, PAP (mean) 16, SVR 1162, SVO2 70 on 1:1 IABP, which is stable when compared to prior Huger Josephine catheter reads. Will plan to remove.   CHIP meeting yesterday-had recommended viability study though pt declined.   Pt seen by Cardiac Surgery and plan would be for CABG with like 3 grafts though arget is difficult given occluded Lcx. Pt to be discussed at high risk coronary meeting on Wednesday 12/4. Pt agreeable to any diagnostic imaging necessary, including CT chest w/o IV contrast and/ or NM viability study   Currently on nitroglycerin gtt with improvement in chest pain (denied chest pain this AM). Also on hydralazine 10mg TID for afterload reduction  DC'd Heparin gtt and started on therapeutic (weight based) Lovenix BID   Pt states that he would be amenable to undergoing diagnostic imaging imaging (Non-con chest CT and viability study) pending possible CABG   Pending transfer to [facility]. Images pushed over by Radiology and transfer requested initiated/received, but awaiting bed availability in CICU at [facility].      Neurologic  - AFSHAN     Cardiovascular  #NSTEMI  - Troponin peaked 8460, down to 3400  - Brilinta loaded on 11/29  - Coronary angiogram 11/29/2024: severe, focal stenosis of the mid and distal LAD, mid RCA, and  proximal left Cx with left to left collaterals  -  Patient had recurrent chest pain 11/30 afternoon. Now relieved this AM  - Had been on nitroglycerin gtt for chest pain but chest pain resolved for 2 days  :: Currently on HYD for afterload reduction   - IABP placed 12/1   Plan:  C/w Hydralazine 10 TID for afterload reduction   C/w rosuvastatin 20mg daily   C/w Aspirin 81 mg daily   Continue metoprolol 37.5mg   Heparin gtt Dc'd this AM. Started therapeutic lovenox (weight based) BID   Holding Brilinta  Holding ACEi/ARB       #HFrEF (EF 32%, 11/29/24)  #Ischemic Cardiomyopathy  - TTE 11/29: EF 32% with LV wall motion abnormalities  - No previous echo on file  Plan:  Metoprolol tartrate up titrated to 37.5 mg q6h   Start rest of GDMT when appropriate     #HTN  - Home meds: coreg 3.125, losartan 25mg (both on hold)       Respiratory  - AFSHAN       Gastrointestinal  - AFSHAN       Renal  #BRUNILDA (improving)  -Cr down-trending to 1.36 today, baseline appears to be 1.23. Cr 1.77 on admission  - Patient euvolemic on exam and CXR  - Possibly PAT (LHC + IABP insert)   Plan:  Will continue to monitor  Avoid nephrotoxins      Hematology  #Leukocytosis (resolved)  - WBC WNL today to 9.8   - No evidence of source of infection  - May have been  inflammatory reaction iso IABP insertion  Plan:  Blood cultures  Continue to monitor       Endocrine  #T2DM  - Baseline on home metformin  - A1c 7.4 11/29/24  Plan:  On SSI #1  Plan for SGLT2i on discharge (for HFrEF)    Dispo: pending [facility] transfer, awaiting bed availability     Fluids: PRN  Electrolytes: PRN  Nutrition: Adult diet Regular, Cardiac; 70 gm fat; 2 - 3 grams Sodium  Lines: PRN  DVT prophylaxis: Therapeutic Lovenox 120mg BID   GI prophylaxis: Protonix    Code Status: Full Code (Confirmed on admission)   Emergency Contact / NOK: Extended Emergency Contact Information  Primary Emergency Contact: Daya Suazo  Mobile Phone: [phone]  Relation: Daughter   needed? No        Sofiya Mccoy MD  Internal Medicine  PGY-1    12/3/2024

## 2024-12-04 ENCOUNTER — APPOINTMENT (OUTPATIENT)
Dept: RADIOLOGY | Facility: HOSPITAL | Age: 69
End: 2024-12-04
Payer: MEDICARE

## 2024-12-04 LAB
ACT BLD: 198 SEC (ref 89–169)
ACT BLD: 237 SEC (ref 89–169)
ACT BLD: 284 SEC (ref 89–169)
ALBUMIN SERPL BCP-MCNC: 3.8 G/DL (ref 3.4–5)
ALP SERPL-CCNC: 87 U/L (ref 33–136)
ALT SERPL W P-5'-P-CCNC: 20 U/L (ref 10–52)
ANION GAP SERPL CALC-SCNC: 15 MMOL/L (ref 10–20)
AST SERPL W P-5'-P-CCNC: 18 U/L (ref 9–39)
ATRIAL RATE: 79 BPM
ATRIAL RATE: 81 BPM
ATRIAL RATE: 84 BPM
ATRIAL RATE: 87 BPM
ATRIAL RATE: 89 BPM
ATRIAL RATE: 93 BPM
BASOPHILS # BLD AUTO: 0.04 X10*3/UL (ref 0–0.1)
BASOPHILS NFR BLD AUTO: 0.4 %
BILIRUB DIRECT SERPL-MCNC: 0.2 MG/DL (ref 0–0.3)
BILIRUB SERPL-MCNC: 0.6 MG/DL (ref 0–1.2)
BUN SERPL-MCNC: 25 MG/DL (ref 6–23)
CALCIUM SERPL-MCNC: 9.2 MG/DL (ref 8.6–10.6)
CHLORIDE SERPL-SCNC: 101 MMOL/L (ref 98–107)
CO2 SERPL-SCNC: 22 MMOL/L (ref 21–32)
CREAT SERPL-MCNC: 1.46 MG/DL (ref 0.5–1.3)
EGFRCR SERPLBLD CKD-EPI 2021: 52 ML/MIN/1.73M*2
EOSINOPHIL # BLD AUTO: 0.6 X10*3/UL (ref 0–0.7)
EOSINOPHIL NFR BLD AUTO: 5.8 %
ERYTHROCYTE [DISTWIDTH] IN BLOOD BY AUTOMATED COUNT: 13.1 % (ref 11.5–14.5)
GLUCOSE BLD MANUAL STRIP-MCNC: 161 MG/DL (ref 74–99)
GLUCOSE BLD MANUAL STRIP-MCNC: 182 MG/DL (ref 74–99)
GLUCOSE BLD MANUAL STRIP-MCNC: 223 MG/DL (ref 74–99)
GLUCOSE BLD MANUAL STRIP-MCNC: 249 MG/DL (ref 74–99)
GLUCOSE BLD MANUAL STRIP-MCNC: 285 MG/DL (ref 74–99)
GLUCOSE SERPL-MCNC: 179 MG/DL (ref 74–99)
HCT VFR BLD AUTO: 39.6 % (ref 41–52)
HGB BLD-MCNC: 14.1 G/DL (ref 13.5–17.5)
IMM GRANULOCYTES # BLD AUTO: 0.06 X10*3/UL (ref 0–0.7)
IMM GRANULOCYTES NFR BLD AUTO: 0.6 % (ref 0–0.9)
LMWH PPP CHRO-ACNC: 0.5 IU/ML
LYMPHOCYTES # BLD AUTO: 1.83 X10*3/UL (ref 1.2–4.8)
LYMPHOCYTES NFR BLD AUTO: 17.5 %
MAGNESIUM SERPL-MCNC: 1.81 MG/DL (ref 1.6–2.4)
MCH RBC QN AUTO: 29 PG (ref 26–34)
MCHC RBC AUTO-ENTMCNC: 35.6 G/DL (ref 32–36)
MCV RBC AUTO: 82 FL (ref 80–100)
MONOCYTES # BLD AUTO: 1.08 X10*3/UL (ref 0.1–1)
MONOCYTES NFR BLD AUTO: 10.4 %
NEUTROPHILS # BLD AUTO: 6.82 X10*3/UL (ref 1.2–7.7)
NEUTROPHILS NFR BLD AUTO: 65.3 %
NRBC BLD-RTO: 0 /100 WBCS (ref 0–0)
P AXIS: 41 DEGREES
P AXIS: 47 DEGREES
P AXIS: 47 DEGREES
P AXIS: 49 DEGREES
P AXIS: 50 DEGREES
P AXIS: 55 DEGREES
P OFFSET: 153 MS
P OFFSET: 154 MS
P OFFSET: 154 MS
P OFFSET: 158 MS
P OFFSET: 160 MS
P OFFSET: 162 MS
P ONSET: 105 MS
P ONSET: 108 MS
P ONSET: 109 MS
P ONSET: 109 MS
P ONSET: 113 MS
P ONSET: 114 MS
PLATELET # BLD AUTO: 173 X10*3/UL (ref 150–450)
POTASSIUM SERPL-SCNC: 4.4 MMOL/L (ref 3.5–5.3)
PR INTERVAL: 198 MS
PR INTERVAL: 200 MS
PR INTERVAL: 200 MS
PR INTERVAL: 206 MS
PR INTERVAL: 212 MS
PR INTERVAL: 214 MS
PROT SERPL-MCNC: 7.2 G/DL (ref 6.4–8.2)
Q ONSET: 209 MS
Q ONSET: 211 MS
Q ONSET: 212 MS
Q ONSET: 213 MS
Q ONSET: 213 MS
Q ONSET: 215 MS
QRS COUNT: 13 BEATS
QRS COUNT: 13 BEATS
QRS COUNT: 14 BEATS
QRS COUNT: 14 BEATS
QRS COUNT: 15 BEATS
QRS COUNT: 15 BEATS
QRS DURATION: 114 MS
QRS DURATION: 116 MS
QT INTERVAL: 390 MS
QT INTERVAL: 398 MS
QT INTERVAL: 398 MS
QT INTERVAL: 416 MS
QT INTERVAL: 430 MS
QT INTERVAL: 436 MS
QTC CALCULATION(BAZETT): 478 MS
QTC CALCULATION(BAZETT): 484 MS
QTC CALCULATION(BAZETT): 484 MS
QTC CALCULATION(BAZETT): 491 MS
QTC CALCULATION(BAZETT): 493 MS
QTC CALCULATION(BAZETT): 506 MS
QTC FREDERICIA: 450 MS
QTC FREDERICIA: 451 MS
QTC FREDERICIA: 453 MS
QTC FREDERICIA: 465 MS
QTC FREDERICIA: 471 MS
QTC FREDERICIA: 481 MS
R AXIS: 11 DEGREES
R AXIS: 22 DEGREES
R AXIS: 48 DEGREES
R AXIS: 55 DEGREES
R AXIS: 56 DEGREES
R AXIS: 9 DEGREES
RBC # BLD AUTO: 4.86 X10*6/UL (ref 4.5–5.9)
SODIUM SERPL-SCNC: 134 MMOL/L (ref 136–145)
T AXIS: 109 DEGREES
T AXIS: 115 DEGREES
T AXIS: 117 DEGREES
T AXIS: 125 DEGREES
T AXIS: 130 DEGREES
T AXIS: 144 DEGREES
T OFFSET: 408 MS
T OFFSET: 408 MS
T OFFSET: 412 MS
T OFFSET: 423 MS
T OFFSET: 427 MS
T OFFSET: 429 MS
VENTRICULAR RATE: 79 BPM
VENTRICULAR RATE: 81 BPM
VENTRICULAR RATE: 84 BPM
VENTRICULAR RATE: 87 BPM
VENTRICULAR RATE: 89 BPM
VENTRICULAR RATE: 93 BPM
WBC # BLD AUTO: 10.4 X10*3/UL (ref 4.4–11.3)

## 2024-12-04 PROCEDURE — 2500000002 HC RX 250 W HCPCS SELF ADMINISTERED DRUGS (ALT 637 FOR MEDICARE OP, ALT 636 FOR OP/ED)

## 2024-12-04 PROCEDURE — 82947 ASSAY GLUCOSE BLOOD QUANT: CPT

## 2024-12-04 PROCEDURE — 2500000004 HC RX 250 GENERAL PHARMACY W/ HCPCS (ALT 636 FOR OP/ED)

## 2024-12-04 PROCEDURE — 97163 PT EVAL HIGH COMPLEX 45 MIN: CPT | Mod: GP

## 2024-12-04 PROCEDURE — 1100000001 HC PRIVATE ROOM DAILY

## 2024-12-04 PROCEDURE — 3430000001 HC RX 343 DIAGNOSTIC RADIOPHARMACEUTICALS: Performed by: INTERNAL MEDICINE

## 2024-12-04 PROCEDURE — 80053 COMPREHEN METABOLIC PANEL: CPT

## 2024-12-04 PROCEDURE — 99291 CRITICAL CARE FIRST HOUR: CPT

## 2024-12-04 PROCEDURE — 97530 THERAPEUTIC ACTIVITIES: CPT | Mod: GP

## 2024-12-04 PROCEDURE — 85025 COMPLETE CBC W/AUTO DIFF WBC: CPT

## 2024-12-04 PROCEDURE — 71045 X-RAY EXAM CHEST 1 VIEW: CPT

## 2024-12-04 PROCEDURE — 2500000001 HC RX 250 WO HCPCS SELF ADMINISTERED DRUGS (ALT 637 FOR MEDICARE OP)

## 2024-12-04 PROCEDURE — 83735 ASSAY OF MAGNESIUM: CPT

## 2024-12-04 PROCEDURE — 71045 X-RAY EXAM CHEST 1 VIEW: CPT | Performed by: RADIOLOGY

## 2024-12-04 PROCEDURE — 78491 MYOCRD IMG PET 1STD RST/STRS: CPT

## 2024-12-04 PROCEDURE — 37799 UNLISTED PX VASCULAR SURGERY: CPT

## 2024-12-04 PROCEDURE — 85520 HEPARIN ASSAY: CPT

## 2024-12-04 PROCEDURE — 82248 BILIRUBIN DIRECT: CPT

## 2024-12-04 PROCEDURE — A9526 NITROGEN N-13 AMMONIA: HCPCS | Performed by: INTERNAL MEDICINE

## 2024-12-04 RX ORDER — HEPARIN SODIUM 10000 [USP'U]/100ML
0-4000 INJECTION, SOLUTION INTRAVENOUS CONTINUOUS
Status: DISCONTINUED | OUTPATIENT
Start: 2024-12-04 | End: 2024-12-06 | Stop reason: HOSPADM

## 2024-12-04 RX ORDER — HYDRALAZINE HYDROCHLORIDE 25 MG/1
25 TABLET, FILM COATED ORAL 3 TIMES DAILY
Status: DISCONTINUED | OUTPATIENT
Start: 2024-12-04 | End: 2024-12-05

## 2024-12-04 RX ORDER — AMMONIA N-13 37.5 MCI/ML
10.1 INJECTION INTRAVENOUS
Status: COMPLETED | OUTPATIENT
Start: 2024-12-04 | End: 2024-12-04

## 2024-12-04 ASSESSMENT — PAIN SCALES - GENERAL
PAINLEVEL_OUTOF10: 0 - NO PAIN

## 2024-12-04 ASSESSMENT — PAIN - FUNCTIONAL ASSESSMENT
PAIN_FUNCTIONAL_ASSESSMENT: 0-10

## 2024-12-04 ASSESSMENT — COGNITIVE AND FUNCTIONAL STATUS - GENERAL
MOBILITY SCORE: 10
WALKING IN HOSPITAL ROOM: A LITTLE
MOVING TO AND FROM BED TO CHAIR: TOTAL
MOVING FROM LYING ON BACK TO SITTING ON SIDE OF FLAT BED WITH BEDRAILS: A LITTLE
CLIMB 3 TO 5 STEPS WITH RAILING: TOTAL
STANDING UP FROM CHAIR USING ARMS: TOTAL
TURNING FROM BACK TO SIDE WHILE IN FLAT BAD: TOTAL

## 2024-12-04 ASSESSMENT — ACTIVITIES OF DAILY LIVING (ADL): ADL_ASSISTANCE: INDEPENDENT

## 2024-12-04 NOTE — PROGRESS NOTES
Subjective Data:  Patient feeling well today, no CP or SOB. IABP in stable position, RLE warm and well perfused with +doppler pulses.     Overnight Events:    None     Objective Data:  Last Recorded Vitals:  Vitals:    12/04/24 0700 12/04/24 0800 12/04/24 0900 12/04/24 1000   BP:       Pulse: 68 66 67 68   Resp: 16 19 22 18   Temp:  36.5 °C (97.7 °F)     TempSrc:  Temporal     SpO2: 95% 96% 95% 95%   Weight:       Height:           Last Labs:  CBC - 12/4/2024:  3:50 AM  10.4 14.1 173    39.6      CMP - 12/4/2024:  3:50 AM  9.2 7.2 18 --- 0.6   5.1 3.8 20 87      PTT - 11/29/2024: 11:51 PM  1.0   11.5 31     TROPHS   Date/Time Value Ref Range Status   11/30/2024 06:33 PM 3,403 0 - 53 ng/L Final     Comment:     Previous result verified on 11/30/2024 0117 on specimen/case [id number] called with component TRPHS for procedure Troponin I, High Sensitivity with value 8,460 ng/L.   11/30/2024 02:25 AM 7,722 0 - 53 ng/L Final     Comment:     Previous result verified on 11/30/2024 0117 on specimen/case [id number] called with component TRPHS for procedure Troponin I, High Sensitivity with value 8,460 ng/L.   11/29/2024 11:51 PM 8,460 0 - 53 ng/L Final   11/29/2024 08:26 AM 1,778 0 - 20 ng/L Final   11/29/2024 03:54 AM 38 0 - 20 ng/L Final   11/29/2024 02:57 AM 23 0 - 20 ng/L Final     BNP   Date/Time Value Ref Range Status   11/29/2024 02:57 AM 72 0 - 99 pg/mL Final     HGBA1C   Date/Time Value Ref Range Status   11/29/2024 02:57 AM 7.4 See comment % Final   06/24/2024 10:43 AM 7.9 4.2 - 6.5 % Final   09/19/2023 04:51 PM 8.0 4.2 - 6.5 % Final     LDLCALC   Date/Time Value Ref Range Status   11/29/2024 03:54 AM 63 <=99 mg/dL Final     Comment:                                 Near   Borderline      AGE      Desirable  Optimal    High     High     Very High     0-19 Y     0 - 109     ---    110-129   >/= 130     ----    20-24 Y     0 - 119     ---    120-159   >/= 160     ----      >24 Y     0 -  99   100-129  130-159    160-189     >/=190       VLDL   Date/Time Value Ref Range Status   11/29/2024 03:54 AM 27 0 - 40 mg/dL Final   09/28/2023 08:14 AM 43 0 - 40 mg/dL Final      Last I/O:  I/O last 3 completed shifts:  In: 642.5 (5.4 mL/kg) [I.V.:642.5 (5.4 mL/kg)]  Out: 2105 (17.6 mL/kg) [Urine:2105 (0.5 mL/kg/hr)]  Weight: 119.7 kg     Past Cardiology Tests (Last 3 Years):  EKG:  ECG 12 Lead 11/30/2024 (Preliminary)      Electrocardiogram 12 Lead 11/29/2024 (Preliminary)      Electrocardiogram, 12-lead PRN ACS symptoms 11/29/2024 (Preliminary)      ECG 12 lead 11/29/2024 (Preliminary)      ECG 12 lead (Clinic Performed) 10/31/2024    Echo:  Transthoracic Echo (TTE) Complete 11/29/2024    Ejection Fractions:  EF   Date/Time Value Ref Range Status   11/29/2024 08:19 AM 32 %      Cath:  Cardiac Catheterization Procedure 11/30/2024      Cardiac Catheterization Procedure 11/29/2024    Stress Test:  No results found for this or any previous visit from the past 1095 days.    Cardiac Imaging:  No results found for this or any previous visit from the past 1095 days.      Inpatient Medications:  Scheduled medications   Medication Dose Route Frequency    aspirin  81 mg oral Daily    hydrALAZINE  25 mg oral TID    [Held by provider] insulin lispro  0-5 Units subcutaneous TID AC    metoprolol tartrate  37.5 mg oral q6h    pantoprazole  40 mg oral Daily before breakfast    polyethylene glycol  17 g oral Daily    rosuvastatin  20 mg oral Nightly     PRN medications   Medication    acetaminophen    Or    acetaminophen    Or    acetaminophen    dextrose    dextrose    glucagon    glucagon    heparin    ipratropium-albuteroL    oxyCODONE     Continuous Medications   Medication Dose Last Rate    heparin  0-4,000 Units/hr         Physical Exam:  General: NAD, lying in bed  Skin: warm and dry   Head/ neck: no JVD seen at 90 degrees  Cardiac: RRR, S1, S2   Pulm: CTAB, room air   Extremities: no LE edema. Right groin with IABP, soft, nontender, with RLE  warm and +doppler DP pulse   Neuro: no focal neuro deficits   Psych: appropriate mood and behavior       Assessment/Plan   Lukasz Rodrigues is a 69 y.o. year old male patient with PMHx of CAD s/p stent 20 years ago, hypertension, hyperlipidemia, DM2 (A1C 7.9% 6/2024) admitted to the OSH 11/29/2024 with complaint of mid sternal chest pain not relieved by nitroglycerin at [facility].     11/29 patient developed CP not relieved with at home SL nitroglycerin, called EMS, EKG unchanged. Trop initially flat but uptrended to 1778. TTE showed EF 32%, taken for LHC and found severe, focal stenosis of the mid and distal left anterior descending artery, mid right coronary artery, and chronically totally occluded proximal left circumflex with left to left collaterals. Patient to be transferred to [facility] for cardiac MRI and evaluation for CABG.     11/30 IABP placed by Dr Jameson at [facility]     12/2 currently waiting CHIP meeting discussion regarding revascularization; IABP stable position, RLE warm with +doppler pulses     12/3-4 patient is considering CABG with Dr. Drew tomorrow vs. Transfer to OSH, IABP position is ok on the CXR    Recommendations:  - daily CXR with IABP; 12/4 stable position on CXR  - please maintain strict bedrest while IABP in place  - closely monitor groin insertion site and distal pulses  - May elevate HOB no greater than 30 degrees  - May log roll patient side to side without flexing involved extremity    Code Status:  Full Code    I spent 30 minutes in the professional and overall care of this patient.    Interventional Cardiology will follow.      CICU Fellow Pager: [phone] anytime  Endovascular /Limb Salvage Team Pager: [phone] for day coverage (8am-5pm)  Interventional Cardiology Fellow Pager: [phone] (7AM-5PM) Night coverage: [facility] Cross Cover [id number]  Night coverage: [facility] [id number]     Kiki Dia MD

## 2024-12-04 NOTE — PROGRESS NOTES
Cardiac ICU Progress Note, 12/4/2024    Admit Date: 11/29/2024   Hospital Length of Stay: 5   ICU Length of Stay: 4d 11h     History of Present Illness  Lukasz Rodrigues is a 69 y.o. male on day 4d 11h of admission for NSTEMI (non-ST elevated myocardial infarction) (Multi).    Interval History  [place] Josephine cath removed yesterday    NAEON   Pt agreeable to undergo pre-op imaging prior to CABG. Planning for NM PET/CT viability study this AM      Subjective  Patient seen and evaluated at bedside with significant other present at bedside.     Patient is alert and oriented.     Otherwise denied chest pain, shortness of breath, palpitations, cough, orthopnea, palpitations, abd pain, N/V/D/C, pain elsewhere or new LE edema    This morning pt and significant other stating that they are looking to be transferred to [facility] as that was their first choice.     Objective    Vitals  Visit Vitals  /79 Comment: IABP   Pulse 64   Temp 36.9 °C (98.4 °F) (Temporal)   Resp 16        Invasive Hemodynamics   Most Recent Range Past 24hrs   BP (Art) 142/59 Arterial Line BP 1  Min: 118/65  Max: 159/74   MAP(Art) 94 mmHg Arterial Line MAP 1 (mmHg)   Min: 86 mmHg  Max: 119 mmHg     I/O    Intake/Output Summary (Last 24 hours) at 12/4/2024 0621  Last data filed at 12/4/2024 0300  Gross per 24 hour   Intake 265.1 ml   Output 1230 ml   Net -964.9 ml        Physical Exam  Physical Exam  Vitals reviewed.   Constitutional:       General: He is awake. He is not in acute distress.     Appearance: Normal appearance. He is obese. He is not ill-appearing or toxic-appearing.   HENT:      Head: Normocephalic and atraumatic.      Nose: Nose normal.      Mouth/Throat:      Mouth: Mucous membranes are moist.   Neck:      Comments: RIJ catheter in place without evidence of drainage, erythema or soreness to touch   Cardiovascular:      Rate and Rhythm: Normal rate and regular rhythm.      Pulses: Normal pulses.      Heart sounds: Normal heart sounds.       Arteriovenous access: Left arteriovenous access is present.  Pulmonary:      Effort: Pulmonary effort is normal.      Breath sounds: Normal breath sounds.   Abdominal:      Palpations: Abdomen is soft.   Genitourinary:     Comments: Right femoral IABP insertion site: No bleeding, swelling, hematoma, or tenderness.  Musculoskeletal:         General: No swelling.      Right lower leg: No edema.      Left lower leg: No edema.   Skin:     General: Skin is warm and dry.   Neurological:      Mental Status: He is alert and oriented to person, place, and time.   Psychiatric:         Behavior: Behavior is cooperative.     Labs    CBC:  Results from last 7 days   Lab Units 12/04/24  0350 12/03/24  0523 12/02/24  0304   WBC AUTO x10*3/uL 10.4 9.8 9.4   HEMOGLOBIN g/dL 14.1 12.0* 11.7*   PLATELETS AUTO x10*3/uL 173 142* 154     BMP:  Results from last 7 days   Lab Units 12/04/24  0350 12/03/24  0523 12/02/24  0304 12/01/24  0619 12/01/24  0133 11/30/24  1700 11/30/24  0541   SODIUM mmol/L 134* 134* 136 135*   < > 134* 136   POTASSIUM mmol/L 4.4 4.3 4.3 4.5   < > 4.9 4.3   CHLORIDE mmol/L 101 102 103 101   < > 101 105   CO2 mmol/L 22 25 24 25   < > 28 21   ANION GAP mmol/L 15 11 13 14   < > 10 14   BUN mg/dL 25* 23 30* 29*   < > 26* 25*   CREATININE mg/dL 1.46* 1.36* 1.80* 2.40*   < > 1.54* 1.39*   EGFR mL/min/1.73m*2 52* 57* 41* 29*   < > 49* 56*   CALCIUM mg/dL 9.2 8.6 8.3* 8.6   < > 9.0 8.8   PHOSPHORUS mg/dL  --   --   --  5.1*  --  4.3 4.2   MAGNESIUM mg/dL 1.81 1.80 2.08  --    < > 1.95 2.29   GLUCOSE mg/dL 179* 208* 207* 184*   < > 168* 171*    < > = values in this interval not displayed.     LFT:  Results from last 7 days   Lab Units 12/04/24  0350 12/03/24  0523 12/02/24  0304   AST U/L 18 13 17   ALT U/L 20 14 14   ALK PHOS U/L 87 70 60   BILIRUBIN TOTAL mg/dL 0.6 0.6 0.6   BILIRUBIN DIRECT mg/dL 0.2 0.1 0.1     Cardiac:  Results from last 7 days   Lab Units 11/30/24  1833 11/30/24  0225 11/29/24  2351 11/29/24  0354  11/29/24  0257   TROPHS   --   --   --    < > 23*   TROPHSCMC ng/L 3,403* 7,722* 8,460*  --   --    BNP pg/mL  --   --   --   --  72    < > = values in this interval not displayed.     Coag:  Results from last 7 days   Lab Units 11/29/24  2351 11/29/24  0826 11/29/24  0310   PROTIME seconds 11.5 11.6 11.0   APTT seconds 31 193*  --    INR  1.0 1.0 1.0     UA:   Results from last 7 days   Lab Units 12/01/24  1211   COLOR U  Light-Yellow   PH U  5.5   SPEC GRAV UR  1.021   PROTEIN U mg/dL 20 (TRACE)   BLOOD UR  NEGATIVE   NITRITE U  NEGATIVE   WBC UR /HPF 6-10*     ABG:  Results from last 7 days   Lab Units 12/03/24  1244 12/03/24  0522 12/02/24  1828 12/01/24  1535 11/30/24 2025   POCT PH, ARTERIAL pH  --   --   --   --  7.45*   POCT PO2, ARTERIAL mm Hg  --   --   --   --  77*   POCT PCO2, ARTERIAL mm Hg  --   --   --   --  37*   FIO2 % 21 21 21   < > 28    < > = values in this interval not displayed.     VBG:  Results from last 7 days   Lab Units 11/29/24 2006   POCT PH, VENOUS pH 7.45*   POCT PCO2, VENOUS mm Hg 35*       Cardiac Data    EKG  Encounter Date: 11/29/24   Electrocardiogram, 12-lead PRN ACS symptoms   Result Value    Ventricular Rate 87    Atrial Rate 87    OH Interval 200    QRS Duration 116    QT Interval 398    QTC Calculation(Bazett) 478    P Axis 55    R Axis 56    T Axis 130    QRS Count 14    Q Onset 209    P Onset 109    P Offset 154    T Offset 408    QTC Fredericia 450    Narrative    Sinus rhythm with frequent Premature ventricular complexes  ST & T wave abnormality, consider anterolateral ischemia  Prolonged QT  Abnormal ECG  When compared with ECG of 30-NOV-2024 18:30,  No significant change was found      Transthoracic Echo (TTE) 11/29/2024  1. Apical septal segment, apical lateral segment, apical anterior segment, and apex are abnormal.  2. The left ventricular systolic function is moderately decreased, with a Mccartney's biplane calculated ejection fraction of 32%.  3. Abnormal left  venticular wall motion.  4. Spectral Doppler shows a Grade I (impaired relaxation pattern) of left ventricular diastolic filling with normal left atrial filling pressure.  5. Left ventricular cavity size is moderately dilated.  6. However, LV cavity size is normal, when indexed to body surface area.  7. There is normal right ventricular global systolic function.     Coronary Angiogram 11/29/2024  1. Ischemic cardiomyopathy.  2. Severe, focal stenosis of the mid and distal left anterior descending artery, mid right coronary artery, and chronically totally occluded proximal left circumflex with left to left collaterals.  3. Guideline directed medical therapy for ACS and heart failure.  4. Will transfer to [facility] for evaluation for bypass surgery.    Imaging  XR chest 1 view    Result Date: 11/29/2024  Hypoventilatory changes with bibasilar atelectasis. No consolidation.   MACRO: None   Signed by: Topher Coyle 11/29/2024 3:32 AM Dictation workstation:   [id number]       Inpatient Medications    Continuous medications         Scheduled medications  aspirin, 81 mg, oral, Daily  enoxaparin, 1 mg/kg, subcutaneous, BID  hydrALAZINE, 10 mg, oral, TID  [Held by provider] insulin lispro, 0-5 Units, subcutaneous, TID AC  metoprolol tartrate, 37.5 mg, oral, q6h  pantoprazole, 40 mg, oral, Daily before breakfast  polyethylene glycol, 17 g, oral, Daily  rosuvastatin, 20 mg, oral, Nightly        PRN medications  PRN medications: acetaminophen **OR** acetaminophen **OR** acetaminophen, dextrose, dextrose, glucagon, glucagon, ipratropium-albuteroL, oxyCODONE      Assessment & Plan  Lukasz Rodrigues is a 69 y.o. male with a PMH of CAD s/p stent 20 years ago, hypertension, hyperlipidemia, DM2 (A1C 7.4% 11/29/2024) on 13h of CICU admission for NSTEMI.     Admitted to the OSH 11/29/2024 with complaint of mid sternal chest pain not relieved by nitroglycerin. TTE showed LVEF of 32% and abnormal left ventricular wall  motion. L heart cath on 11/29, found severe, focal stenosis of the mid and distal LAD, mid RCA, and  proximal left Cx with left to left collaterals. He was transferred to the CICU for further evaluation and management.   IABP placed 11/30. [place] Josephine catheter placed on 12/1 and then removed 12/3.       Updates 12/4/24:  Pt seen by Cardiac Surgery and plan would likely be for CABG with like 3 grafts though target is difficult given occluded Lcx. Pt to be discussed at high risk coronary meeting today. Pt states he is amenable to discussing surgical intervention here  NM PET/CT Viability done today.  No FDG Viability done due to only small area of decreased activity seen in the apical lateral wall  Non-con chest CT ordered, pending   Increased hydralazine to 25mg TID   Pending transfer to [facility]. Prior images pushed over by Radiology and transfer requested initiated/received, but awaiting bed availability in CICU at [facility]. Per [facility] transfer center, unsure if pt definitely will have surgery at [facility] as he will require eval upon arrival       Neurologic  - AFSHAN       Cardiovascular  #NSTEMI  - Troponin peaked 8460, down to 3400  - Brilinta loaded on 11/29  - Coronary angiogram 11/29/2024: severe, focal stenosis of the mid and distal LAD, mid RCA, and  proximal left Cx with left to left collaterals  - Patient had recurrent chest pain 11/30 afternoon. Now relieved this AM  - Had been on nitroglycerin gtt for chest pain but chest pain resolved   :: Currently on HYD for afterload reduction   - IABP placed 12/1   Plan:  Increased Hydralazine 25mg TID for afterload reduction   C/w rosuvastatin 20mg daily   C/w Aspirin 81 mg daily   Continue metoprolol 37.5mg   Will resume Heparin gtt tonight at 1900 pending heparin assay <0.6   Holding Brilinta  Holding ACEi/ARB       #HFrEF (EF 32%, 11/29/24)  #Ischemic Cardiomyopathy  - TTE 11/29: EF 32% with LV wall motion abnormalities  - No previous echo on file  Plan:  Continue Metoprolol  tartrate 37.5 mg q6h   Start rest of GDMT when appropriate     #HTN  - Home meds: coreg 3.125, losartan 25mg (both on hold)       Respiratory  - AFSHAN       Gastrointestinal  - AFSHAN       Renal  #BRUNILDA (improving)  -Cr to 1.46 today, baseline appears to be 1.23. Cr 1.77 on admission  - Patient euvolemic on exam and CXR  - Possibly 2/2 PAT (LHC + IABP insert)   Plan:  Will continue to monitor  Avoid nephrotoxins      Hematology  #Leukocytosis (resolved)  - WBC WNL today, has been normal for 72hrs+   - No evidence of source of infection  - May have been  inflammatory reaction iso IABP insertion  Plan:  Continue to monitor       Endocrine  #T2DM (A1c 7.4 11/29/24)  - Baseline on home metformin    Plan:  On SSI #1  Plan for SGLT2i on discharge (for HFrEF)    Dispo: pending possible [facility] transfer, currently no bed availability. Possibly may undergo CABG pending high risk coronary meeting    Fluids: PRN  Electrolytes: PRN  Nutrition: Cardiac   Lines: PRN  DVT prophylaxis: Therapeutic Lovenox 120mg BID   GI prophylaxis: Protonix    Code Status: Full Code (Confirmed on admission)   Emergency Contact / NOK: Extended Emergency Contact Information  Primary Emergency Contact: Daya Suazo  Mobile Phone: [phone]  Relation: Daughter   needed? No        Sofiya Mccoy MD  Internal Medicine PGY-1    12/4/2024

## 2024-12-04 NOTE — PROGRESS NOTES
Physical Therapy    Physical Therapy Evaluation & Treatment    Patient Name: Lukasz Rodrigues  MRN: [id number]  Department: [facility]  Room: 12/12-A  Today's Date: 12/4/2024   Time Calculation  Start Time: 1446  Stop Time: 1532  Time Calculation (min): 46 min    Assessment/Plan   PT Assessment  PT Assessment Results: Decreased strength, Decreased endurance, Impaired balance, Decreased mobility  Rehab Prognosis: Excellent  Barriers to Discharge: femoral IABP  End of Session Communication: Bedside nurse  End of Session Patient Position: Bed, 3 rail up, Alarm off, not on at start of session   IP OR SWING BED PT PLAN  Inpatient or Swing Bed: Inpatient  PT Plan  Treatment/Interventions: Bed mobility, Transfer training, Gait training, Balance training, Strengthening, Endurance training, Therapeutic exercise, Therapeutic activity  PT Plan: Ongoing PT  PT Frequency: 5 times per week  PT Discharge Recommendations:  (unable to determine until s/p removal of femoral IABP)  PT Recommended Transfer Status: Assist x1, Assistive device    Subjective     General Visit Information:  General  Reason for Referral: Chest pain with suspicion for NSTEMI vs. unstable angina; L heart cath on 11/29, found severe, focal stenosis of the mid and distal left anterior descending artery, mid right coronary artery, and chronically totally occluded proximal left circumflex with left to left collaterals. Patient transferred to [facility] for further evaluation; s/p R femoral IABP on 11/30  Past Medical History Relevant to Rehab: CAD s/p stent 20 years ago, hypertension, hyperlipidemia, DM2  Family/Caregiver Present: Yes  Prior to Session Communication: Bedside nurse  Patient Position Received: Bed, 3 rail up, Alarm off, not on at start of session  General Comment: Pt pleasant and motivated for OOB mobility. Hemodynamically stable throughout.    Home Living:  Home Living  Type of Home: House  Lives With: Alone  Home Adaptive Equipment: None  Home Layout:  Stairs to alternate level with rails  Alternate Level Stairs-Rails: None  Alternate Level Stairs-Number of Steps: 1  Home Access: Stairs to enter without rails  Entrance Stairs-Rails: None  Entrance Stairs-Number of Steps: 2    Prior Level of Function:  Prior Function Per Pt/Caregiver Report  Level of [place]: Independent with ADLs and functional transfers  ADL Assistance: Independent  Homemaking Assistance: Independent  Ambulatory Assistance: Independent (Community ambulator, no AD; denies falls.)  Vocational: Full time employment (Works as a )  Leisure: likes to work    Precautions:  Precautions  Medical Precautions: Cardiac precautions, Fall precautions  Precautions Comment: R hip IABP precautions: No R hip FLEX >30 degrees, No excessive R hip EXT.    IABP     Location: R femoral access Site Appearance: groin stable throughout, dark dried blood around site but unchanged   Support: 1:1    Vitals Session Pre PT During PT Post PT   Heart Rate 65  66   Resp 23  20   SpO2 97  97   BP- IABP console 123/87  147/70     Objective     Pain:  Pain Assessment  Pain Assessment: 0-10  0-10 (Numeric) Pain Score:  (Pt reporting some low back pain from being in the bed- unrated. Reports some back pain at baseline.)    Cognition:  Cognition  Overall Cognitive Status: Within Functional Limits  Arousal/Alertness: Appropriate responses to stimuli  Orientation Level: Oriented X4  Following Commands: Follows all commands and directions without difficulty    General Assessments:   Activity Tolerance  Early Mobility/Exercise Safety Screen: Proceed with mobilization - No exclusion criteria met    Sensation  Light Touch:  (Intact light touch sensation)    Strength  Strength Comments: B LE stength >/= 4-/5 throughout  Strength  Strength Comments: B LE stength >/= 4-/5 throughout    Static Sitting Balance  Static Sitting-Balance Support:  (N/A)  Dynamic Sitting Balance  Dynamic Sitting-Balance Support:  (N/A)    Static  Standing Balance  Static Standing-Balance Support: Bilateral upper extremity supported (CGA)  Dynamic Standing Balance  Dynamic Standing-Balance Support: Bilateral upper extremity supported (CGA)    Functional Assessments:       Transfers  Transfer: Yes  Transfer 1  Transfer From 1: Bed to  Transfer to 1: Tilt table  Technique 1: Lateral (draw sheet)  Transfer Level of Assistance 1: Dependent (x3 person; cues for sequencing)  Transfers 2  Transfer From 2: Tilt table to  Transfer to 2: Bed  Technique 2: Lateral (with draw sheet)  Transfer Level of Assistance 2: Dependent (x3 person; cues for seqeuncing)  Transfers 3  Transfer From 3: Tilt table to  Transfer to 3: Stand  Transfer Device 3: Walker (once in standing)  Transfer Level of Assistance 3:  (dependent via tilt table)  Trials/Comments 3: increased from flat supine on tilt table to full standing by 15 degree increments. Hemodynamic response assessed for 3-5 minutes then progressed as pt was stable.  Transfers 4  Transfer From 4: Stand to  Transfer to 4: Tilt table  Transfer Level of Assistance 4: Dependent (via tilt table)  Trials/Comments 4: returned from standing to supine on tilt table by 30 degree increments and hemodynamic response assessed    Ambulation/Gait Training  Ambulation/Gait Training Performed: Yes  Ambulation/Gait Training 1  Surface 1: Level tile  Device 1: Rolling walker  Assistance 1: Contact guard (cues for sequencing and maintaining precautions for R LE precautions)  Quality of Gait 1: Forward flexed posture, Decreased step length (pt with increased tension in B UTs while bearing weight on FWW)  Comments/Distance (ft) 1: 20 ft    Extremity/Trunk Assessments:  RLE   RLE :  (within limits of femoral IABP restrictions)  LLE   LLE : Within Functional Limits    Treatments:  Therapeutic Exercise  Therapeutic Exercise Performed: Yes  Therapeutic Exercise Activity 1: Pt completed B LE therex at 45 degree tilt, 60 degree tilt, and 75 degree tilt: L  KATARINA hutchinson x 5 reps, and R KATARINA ocampo SLR x 5. B HHA with minAx2. Pt stood statically with FWW and CGA with cues for upright posture. Stood ~3 minutes.  Therapeutic Exercise Activity 2: Pt tolerated being on tilt table >45 degrees for 15 minutes with stable hemodynamics.     Outcome Measures:  [facility] Basic Mobility  Turning from your back to your side while in a flat bed without using bedrails: A little  Moving from lying on your back to sitting on the side of a flat bed without using bedrails: Total  Moving to and from bed to chair (including a wheelchair): Total  Standing up from a chair using your arms (e.g. wheelchair or bedside chair): Total  To walk in hospital room: A little  Climbing 3-5 steps with railing: Total  Basic Mobility - Total Score: 10    Confusion Assessment Method-ICU (CAM-ICU)  Feature 1: Acute Onset or Fluctuating Course: Negative  Overall CAM-ICU: Negative    FSS-ICU  Ambulation: Walks <50 feet with any assistance x1 or walks any distance with assistance x2 people  Rolling: Minimal assistance (performs 75% or more of task)  Sitting: Unable to perform  Transfer Sit-to-Stand: Unable to perform  Transfer Supine-to-Sit: Unable to perform  Total Score: 5    Early Mobility/Exercise Safety Screen: Proceed with mobilization - No exclusion criteria met  ICU Mobility Scale: Walking with assistance of 2 or more people [7]  E = Exercise and Early Mobility  Early Mobility/Exercise Safety Screen: Proceed with mobilization - No exclusion criteria met  ICU Mobility Scale: Walking with assistance of 2 or more people    Encounter Problems       Encounter Problems (Active)       Balance       Pt will be able to tolerate standing dynamic activity with unilateral UE support >10 minutes with supervision (Progressing)       Start:  12/04/24    Expected End:  12/18/24               Mobility       Patient will ambulate with FWW >1000 ft with supervision (Progressing)       Start:  12/04/24    Expected End:  12/18/24                PT Transfers       Patient will roll with supervision. (Progressing)       Start:  12/04/24    Expected End:  12/18/24               Pain - Adult            Education Documentation  Precautions, taught by Alona Joya PT at 12/4/2024  5:30 PM.  Learner: Patient  Readiness: Acceptance  Method: Explanation  Response: Verbalizes Understanding  Comment: R femoral IABP precautions    Mobility Training, taught by Alona Joya PT at 12/4/2024  5:30 PM.  Learner: Patient  Readiness: Acceptance  Method: Explanation  Response: Verbalizes Understanding  Comment: mobility precautions    Education Comments  No comments found.    Signed by Alona Joya DPT

## 2024-12-05 ENCOUNTER — APPOINTMENT (OUTPATIENT)
Dept: RADIOLOGY | Facility: HOSPITAL | Age: 69
End: 2024-12-05
Payer: MEDICARE

## 2024-12-05 LAB
ALBUMIN SERPL BCP-MCNC: 3.6 G/DL (ref 3.4–5)
ALP SERPL-CCNC: 93 U/L (ref 33–136)
ALT SERPL W P-5'-P-CCNC: 23 U/L (ref 10–52)
ANION GAP SERPL CALC-SCNC: 15 MMOL/L (ref 10–20)
AST SERPL W P-5'-P-CCNC: 20 U/L (ref 9–39)
BACTERIA BLD CULT: NORMAL
BACTERIA BLD CULT: NORMAL
BASOPHILS # BLD AUTO: 0.04 X10*3/UL (ref 0–0.1)
BASOPHILS NFR BLD AUTO: 0.3 %
BILIRUB DIRECT SERPL-MCNC: 0.2 MG/DL (ref 0–0.3)
BILIRUB SERPL-MCNC: 0.7 MG/DL (ref 0–1.2)
BUN SERPL-MCNC: 30 MG/DL (ref 6–23)
CALCIUM SERPL-MCNC: 9 MG/DL (ref 8.6–10.6)
CHLORIDE SERPL-SCNC: 102 MMOL/L (ref 98–107)
CO2 SERPL-SCNC: 20 MMOL/L (ref 21–32)
CREAT SERPL-MCNC: 1.46 MG/DL (ref 0.5–1.3)
EGFRCR SERPLBLD CKD-EPI 2021: 52 ML/MIN/1.73M*2
EOSINOPHIL # BLD AUTO: 0.7 X10*3/UL (ref 0–0.7)
EOSINOPHIL NFR BLD AUTO: 5.7 %
ERYTHROCYTE [DISTWIDTH] IN BLOOD BY AUTOMATED COUNT: 13.1 % (ref 11.5–14.5)
GLUCOSE BLD MANUAL STRIP-MCNC: 181 MG/DL (ref 74–99)
GLUCOSE BLD MANUAL STRIP-MCNC: 184 MG/DL (ref 74–99)
GLUCOSE BLD MANUAL STRIP-MCNC: 199 MG/DL (ref 74–99)
GLUCOSE BLD MANUAL STRIP-MCNC: 223 MG/DL (ref 74–99)
GLUCOSE BLD MANUAL STRIP-MCNC: 227 MG/DL (ref 74–99)
GLUCOSE SERPL-MCNC: 182 MG/DL (ref 74–99)
HCT VFR BLD AUTO: 40.9 % (ref 41–52)
HGB BLD-MCNC: 14.4 G/DL (ref 13.5–17.5)
IMM GRANULOCYTES # BLD AUTO: 0.07 X10*3/UL (ref 0–0.7)
IMM GRANULOCYTES NFR BLD AUTO: 0.6 % (ref 0–0.9)
LYMPHOCYTES # BLD AUTO: 2.48 X10*3/UL (ref 1.2–4.8)
LYMPHOCYTES NFR BLD AUTO: 20.2 %
MAGNESIUM SERPL-MCNC: 1.82 MG/DL (ref 1.6–2.4)
MCH RBC QN AUTO: 28.9 PG (ref 26–34)
MCHC RBC AUTO-ENTMCNC: 35.2 G/DL (ref 32–36)
MCV RBC AUTO: 82 FL (ref 80–100)
MONOCYTES # BLD AUTO: 1.13 X10*3/UL (ref 0.1–1)
MONOCYTES NFR BLD AUTO: 9.2 %
NEUTROPHILS # BLD AUTO: 7.88 X10*3/UL (ref 1.2–7.7)
NEUTROPHILS NFR BLD AUTO: 64 %
NRBC BLD-RTO: 0 /100 WBCS (ref 0–0)
PLATELET # BLD AUTO: 169 X10*3/UL (ref 150–450)
POTASSIUM SERPL-SCNC: 4.2 MMOL/L (ref 3.5–5.3)
PROT SERPL-MCNC: 6.5 G/DL (ref 6.4–8.2)
RBC # BLD AUTO: 4.98 X10*6/UL (ref 4.5–5.9)
SODIUM SERPL-SCNC: 133 MMOL/L (ref 136–145)
UFH PPP CHRO-ACNC: 0.1 IU/ML
UFH PPP CHRO-ACNC: 0.2 IU/ML
UFH PPP CHRO-ACNC: 0.2 IU/ML
UFH PPP CHRO-ACNC: 0.3 IU/ML
UFH PPP CHRO-ACNC: 0.3 IU/ML
URATE SERPL-MCNC: 6.4 MG/DL (ref 4–7.5)
WBC # BLD AUTO: 12.3 X10*3/UL (ref 4.4–11.3)

## 2024-12-05 PROCEDURE — 82947 ASSAY GLUCOSE BLOOD QUANT: CPT

## 2024-12-05 PROCEDURE — 83735 ASSAY OF MAGNESIUM: CPT

## 2024-12-05 PROCEDURE — 2500000002 HC RX 250 W HCPCS SELF ADMINISTERED DRUGS (ALT 637 FOR MEDICARE OP, ALT 636 FOR OP/ED)

## 2024-12-05 PROCEDURE — 82248 BILIRUBIN DIRECT: CPT

## 2024-12-05 PROCEDURE — 2500000001 HC RX 250 WO HCPCS SELF ADMINISTERED DRUGS (ALT 637 FOR MEDICARE OP)

## 2024-12-05 PROCEDURE — 2500000004 HC RX 250 GENERAL PHARMACY W/ HCPCS (ALT 636 FOR OP/ED)

## 2024-12-05 PROCEDURE — 84550 ASSAY OF BLOOD/URIC ACID: CPT

## 2024-12-05 PROCEDURE — 71045 X-RAY EXAM CHEST 1 VIEW: CPT | Performed by: RADIOLOGY

## 2024-12-05 PROCEDURE — 71250 CT THORAX DX C-: CPT

## 2024-12-05 PROCEDURE — 37799 UNLISTED PX VASCULAR SURGERY: CPT

## 2024-12-05 PROCEDURE — 85025 COMPLETE CBC W/AUTO DIFF WBC: CPT

## 2024-12-05 PROCEDURE — 99291 CRITICAL CARE FIRST HOUR: CPT | Performed by: NURSE PRACTITIONER

## 2024-12-05 PROCEDURE — 1100000001 HC PRIVATE ROOM DAILY

## 2024-12-05 PROCEDURE — 80053 COMPREHEN METABOLIC PANEL: CPT

## 2024-12-05 PROCEDURE — 71045 X-RAY EXAM CHEST 1 VIEW: CPT

## 2024-12-05 PROCEDURE — 85520 HEPARIN ASSAY: CPT

## 2024-12-05 PROCEDURE — 99291 CRITICAL CARE FIRST HOUR: CPT

## 2024-12-05 RX ORDER — ROSUVASTATIN CALCIUM 40 MG/1
40 TABLET, COATED ORAL NIGHTLY
Status: DISCONTINUED | OUTPATIENT
Start: 2024-12-05 | End: 2024-12-06 | Stop reason: HOSPADM

## 2024-12-05 RX ORDER — HYDRALAZINE HYDROCHLORIDE 50 MG/1
50 TABLET, FILM COATED ORAL 3 TIMES DAILY
Status: DISCONTINUED | OUTPATIENT
Start: 2024-12-05 | End: 2024-12-06

## 2024-12-05 RX ORDER — MAGNESIUM SULFATE HEPTAHYDRATE 40 MG/ML
2 INJECTION, SOLUTION INTRAVENOUS ONCE
Status: COMPLETED | OUTPATIENT
Start: 2024-12-05 | End: 2024-12-05

## 2024-12-05 ASSESSMENT — PAIN - FUNCTIONAL ASSESSMENT
PAIN_FUNCTIONAL_ASSESSMENT: 0-10

## 2024-12-05 ASSESSMENT — PAIN SCALES - GENERAL
PAINLEVEL_OUTOF10: 0 - NO PAIN
PAINLEVEL_OUTOF10: 5 - MODERATE PAIN
PAINLEVEL_OUTOF10: 5 - MODERATE PAIN
PAINLEVEL_OUTOF10: 0 - NO PAIN

## 2024-12-05 ASSESSMENT — PAIN DESCRIPTION - DESCRIPTORS
DESCRIPTORS: ACHING
DESCRIPTORS: ACHING

## 2024-12-05 ASSESSMENT — PAIN DESCRIPTION - LOCATION: LOCATION: ANKLE

## 2024-12-05 NOTE — PROGRESS NOTES
Cardiac ICU Progress Note, 12/5/2024    Admit Date: 11/29/2024   Hospital Length of Stay: 6   ICU Length of Stay: 5d 16h     History of Present Illness  Lukasz Rodrigues is a 69 y.o. male on day 5d 16h of admission for NSTEMI (non-ST elevated myocardial infarction) (Multi).    Interval History  NAEON    Subjective  This AM patient feels well and denies CP/SOB. States he would prefer to wait for transfer to [facility] for his CABG. Otherwise has no complaints.     Objective    Vitals  Visit Vitals  /57 Comment: iabp   Pulse 65   Temp 36.2 °C (97.2 °F) (Temporal)   Resp 14        Invasive Hemodynamics   Most Recent Range Past 24hrs   BP (Art) 139/80 Arterial Line BP 1  Min: 121/57  Max: 167/85   MAP(Art) 108 mmHg Arterial Line MAP 1 (mmHg)   Min: 85 mmHg  Max: 123 mmHg     I/O    Intake/Output Summary (Last 24 hours) at 12/5/2024 1043  Last data filed at 12/5/2024 1000  Gross per 24 hour   Intake 630.03 ml   Output 1675 ml   Net -1044.97 ml        Physical Exam  Physical Exam  Vitals reviewed.   Constitutional:       General: He is awake. He is not in acute distress.     Appearance: Normal appearance. He is obese. He is not ill-appearing or toxic-appearing.   HENT:      Head: Normocephalic and atraumatic.      Nose: Nose normal.      Mouth/Throat:      Mouth: Mucous membranes are moist.   Neck:      Comments: RIJ catheter in place without evidence of drainage, erythema or soreness to touch   Cardiovascular:      Rate and Rhythm: Normal rate and regular rhythm.      Pulses: Normal pulses.      Heart sounds: Normal heart sounds.      Arteriovenous access: Left arteriovenous access is present.  Pulmonary:      Effort: Pulmonary effort is normal.      Breath sounds: Normal breath sounds.   Abdominal:      Palpations: Abdomen is soft.   Genitourinary:     Comments: Right femoral IABP insertion site: No bleeding, swelling, hematoma, or tenderness.  Musculoskeletal:         General: No swelling.      Right lower leg: No edema.       Left lower leg: No edema.   Skin:     General: Skin is warm and dry.   Neurological:      Mental Status: He is alert and oriented to person, place, and time.   Psychiatric:         Behavior: Behavior is cooperative.       Labs    CBC:  Results from last 7 days   Lab Units 12/05/24 0408 12/04/24 0350 12/03/24  0523   WBC AUTO x10*3/uL 12.3* 10.4 9.8   HEMOGLOBIN g/dL 14.4 14.1 12.0*   PLATELETS AUTO x10*3/uL 169 173 142*     BMP:  Results from last 7 days   Lab Units 12/05/24 0408 12/04/24  0350 12/03/24  0523 12/02/24  0304 12/01/24  0619 12/01/24  0133 11/30/24  1700 11/30/24  0541   SODIUM mmol/L 133* 134* 134*   < > 135*   < > 134* 136   POTASSIUM mmol/L 4.2 4.4 4.3   < > 4.5   < > 4.9 4.3   CHLORIDE mmol/L 102 101 102   < > 101   < > 101 105   CO2 mmol/L 20* 22 25   < > 25   < > 28 21   ANION GAP mmol/L 15 15 11   < > 14   < > 10 14   BUN mg/dL 30* 25* 23   < > 29*   < > 26* 25*   CREATININE mg/dL 1.46* 1.46* 1.36*   < > 2.40*   < > 1.54* 1.39*   EGFR mL/min/1.73m*2 52* 52* 57*   < > 29*   < > 49* 56*   CALCIUM mg/dL 9.0 9.2 8.6   < > 8.6   < > 9.0 8.8   PHOSPHORUS mg/dL  --   --   --   --  5.1*  --  4.3 4.2   MAGNESIUM mg/dL 1.82 1.81 1.80   < >  --    < > 1.95 2.29   GLUCOSE mg/dL 182* 179* 208*   < > 184*   < > 168* 171*    < > = values in this interval not displayed.     LFT:  Results from last 7 days   Lab Units 12/05/24 0408 12/04/24  0350 12/03/24  0523   AST U/L 20 18 13   ALT U/L 23 20 14   ALK PHOS U/L 93 87 70   BILIRUBIN TOTAL mg/dL 0.7 0.6 0.6   BILIRUBIN DIRECT mg/dL 0.2 0.2 0.1     Cardiac:  Results from last 7 days   Lab Units 11/30/24  1833 11/30/24  0225 11/29/24 2351 11/29/24  0354 11/29/24  0257   TROPHS   --   --   --    < > 23*   TROPHSCMC ng/L 3,403* 7,722* 8,460*  --   --    BNP pg/mL  --   --   --   --  72    < > = values in this interval not displayed.     Coag:  Results from last 7 days   Lab Units 11/29/24 2351 11/29/24  0826 11/29/24  0310   PROTIME seconds 11.5 11.6 11.0    APTT seconds 31 193*  --    INR  1.0 1.0 1.0     UA:   Results from last 7 days   Lab Units 12/01/24  1211   COLOR U  Light-Yellow   PH U  5.5   SPEC GRAV UR  1.021   PROTEIN U mg/dL 20 (TRACE)   BLOOD UR  NEGATIVE   NITRITE U  NEGATIVE   WBC UR /HPF 6-10*     ABG:  Results from last 7 days   Lab Units 12/03/24  1244 12/03/24  0522 12/02/24  1828 12/01/24  1535 11/30/24 2025   POCT PH, ARTERIAL pH  --   --   --   --  7.45*   POCT PO2, ARTERIAL mm Hg  --   --   --   --  77*   POCT PCO2, ARTERIAL mm Hg  --   --   --   --  37*   FIO2 % 21 21 21   < > 28    < > = values in this interval not displayed.     VBG:  Results from last 7 days   Lab Units 11/29/24 2006   POCT PH, VENOUS pH 7.45*   POCT PCO2, VENOUS mm Hg 35*       Cardiac Data    EKG  Encounter Date: 11/29/24   Electrocardiogram, 12-lead PRN ACS symptoms   Result Value    Ventricular Rate 87    Atrial Rate 87    IL Interval 200    QRS Duration 116    QT Interval 398    QTC Calculation(Bazett) 478    P Axis 55    R Axis 56    T Axis 130    QRS Count 14    Q Onset 209    P Onset 109    P Offset 154    T Offset 408    QTC Fredericia 450    Narrative    Sinus rhythm with frequent Premature ventricular complexes  ST & T wave abnormality, consider anterolateral ischemia  Prolonged QT  Abnormal ECG  When compared with ECG of 30-NOV-2024 18:30,  No significant change was found  Confirmed by Doroteo Garland ([id number]) on 12/4/2024 1:14:28 PM      Transthoracic Echo (TTE) 11/29/2024  1. Apical septal segment, apical lateral segment, apical anterior segment, and apex are abnormal.  2. The left ventricular systolic function is moderately decreased, with a Mccartney's biplane calculated ejection fraction of 32%.  3. Abnormal left venticular wall motion.  4. Spectral Doppler shows a Grade I (impaired relaxation pattern) of left ventricular diastolic filling with normal left atrial filling pressure.  5. Left ventricular cavity size is moderately dilated.  6. However, LV cavity  size is normal, when indexed to body surface area.  7. There is normal right ventricular global systolic function.     Coronary Angiogram 11/29/2024  1. Ischemic cardiomyopathy.  2. Severe, focal stenosis of the mid and distal left anterior descending artery, mid right coronary artery, and chronically totally occluded proximal left circumflex with left to left collaterals.  3. Guideline directed medical therapy for ACS and heart failure.  4. Will transfer to [facility] for evaluation for bypass surgery.    Imaging  XR chest 1 view    Result Date: 11/29/2024  Hypoventilatory changes with bibasilar atelectasis. No consolidation.   MACRO: None   Signed by: Topher Coyle 11/29/2024 3:32 AM Dictation workstation:   [id number]       Inpatient Medications    Continuous medications  heparin, 0-4,000 Units/hr, Last Rate: 1,200 Units/hr (12/05/24 1000)          Scheduled medications  aspirin, 81 mg, oral, Daily  hydrALAZINE, 50 mg, oral, TID  insulin lispro, 0-5 Units, subcutaneous, TID AC  magnesium sulfate, 2 g, intravenous, Once  metoprolol tartrate, 37.5 mg, oral, q6h  pantoprazole, 40 mg, oral, Daily before breakfast  polyethylene glycol, 17 g, oral, Daily  rosuvastatin, 40 mg, oral, Nightly        PRN medications  PRN medications: acetaminophen **OR** acetaminophen **OR** acetaminophen, dextrose, dextrose, glucagon, glucagon, heparin, ipratropium-albuteroL, oxyCODONE      Assessment & Plan  Lukasz Rodrigues is a 69 y.o. male with a PMH of CAD s/p stent 20 years ago, hypertension, hyperlipidemia, DM2 (A1C 7.4% 11/29/2024) on 13h of CICU admission for NSTEMI.     Admitted to the OSH 11/29/2024 with complaint of mid sternal chest pain not relieved by nitroglycerin. TTE showed LVEF of 32% and abnormal left ventricular wall motion. L heart cath on 11/29, found severe, focal stenosis of the mid and distal LAD, mid RCA, and  proximal left Cx with left to left collaterals. He was transferred to the CICU for  further evaluation and management.   IABP placed 11/30. [place] Josephine catheter placed on 12/1 and then removed 12/3. PET only with small apical perfusion defect so no viability study performed. Will likely require CABG however prefers [facility] so pending transfer.      Updates 12/4/24:  Pt seen by Cardiac Surgery and plan would likely be for CABG with like 3 grafts though target is difficult given occluded Lcx, prefers to wait for surgery at [facility] after transfer  Increased hydralazine to 50mg TID, rosuva to 40   Pending transfer to [facility]      Neurologic  - AFSHAN       Cardiovascular  #NSTEMI  - Troponin peaked 8460, down to 3400  - Brilinta loaded on 11/29  - Coronary angiogram 11/29/2024: severe, focal stenosis of the mid and distal LAD, mid RCA, and  proximal left Cx with left to left collaterals  - Patient had recurrent chest pain 11/30 afternoon. Now relieved this AM  - Had been on nitroglycerin gtt for chest pain but chest pain resolved   :: Currently on HYD for afterload reduction   - IABP placed 12/1   Plan:  Increased Hydralazine 50mg TID for afterload reduction   C/w rosuvastatin increased to 40mg daily   C/w Aspirin 81 mg daily   Continue metoprolol 37.5mg   Will resume Heparin gtt tonight at 1900 pending heparin assay <0.6   Holding Brilinta  Holding ACEi/ARB       #HFrEF (EF 32%, 11/29/24)  #Ischemic Cardiomyopathy  - TTE 11/29: EF 32% with LV wall motion abnormalities  - No previous echo on file  Plan:  Continue Metoprolol tartrate 37.5 mg q6h   Start rest of GDMT when appropriate     #HTN  - Home meds: coreg 3.125, losartan 25mg (both on hold)       Respiratory  - AFSHAN       Gastrointestinal  - AFSHAN       Renal  #BRUNILDA (improving)  -Cr to 1.46 today, baseline appears to be 1.23. Cr 1.77 on admission  - Patient euvolemic on exam and CXR  - Possibly 2/2 PAT (C + IABP insert)   Plan:  Will continue to monitor  Avoid nephrotoxins      Hematology  #Leukocytosis (resolved)  - WBC WNL today, has been normal for 72hrs+   -  No evidence of source of infection  - May have been  inflammatory reaction iso IABP insertion  Plan:  Continue to monitor       Endocrine  #T2DM (A1c 7.4 11/29/24)  - Baseline on home metformin    Plan:  On SSI #1  Plan for SGLT2i on discharge (for HFrEF)    Dispo: pending possible [facility] transfer, currently no bed availability.    Fluids: PRN  Electrolytes: PRN  Nutrition: Cardiac  Lines: PRN  DVT prophylaxis: heparin gtt  GI prophylaxis: Protonix    Code Status: Full Code (Confirmed on admission)   Emergency Contact / NOK: Extended Emergency Contact Information  Primary Emergency Contact: Daya Suazo  Mobile Phone: [phone]  Relation: Daughter   needed? No        Joel Epstein PGY2    12/5/2024

## 2024-12-05 NOTE — PROGRESS NOTES
Subjective Data:  Patient seen this AM, lying in bed, denies CP or SOB. Pending bed availability at [facility].     Overnight Events:    None     Objective Data:  Last Recorded Vitals:  Vitals:    12/05/24 1000 12/05/24 1100 12/05/24 1200 12/05/24 1207   BP:       Pulse: 65 65 65    Resp: 14 14 14    Temp:    36.4 °C (97.5 °F)   TempSrc:    Temporal   SpO2: 96% 96% 95%    Weight:       Height:           Last Labs:  CBC - 12/5/2024:  4:08 AM  12.3 14.4 169    40.9      CMP - 12/5/2024:  4:08 AM  9.0 6.5 20 --- 0.7   5.1 3.6 23 93      PTT - 11/29/2024: 11:51 PM  1.0   11.5 31     TROPHS   Date/Time Value Ref Range Status   11/30/2024 06:33 PM 3,403 0 - 53 ng/L Final     Comment:     Previous result verified on 11/30/2024 0117 on specimen/case [id number] called with component TRPHS for procedure Troponin I, High Sensitivity with value 8,460 ng/L.   11/30/2024 02:25 AM 7,722 0 - 53 ng/L Final     Comment:     Previous result verified on 11/30/2024 0117 on specimen/case [id number] called with component TRPHS for procedure Troponin I, High Sensitivity with value 8,460 ng/L.   11/29/2024 11:51 PM 8,460 0 - 53 ng/L Final   11/29/2024 08:26 AM 1,778 0 - 20 ng/L Final   11/29/2024 03:54 AM 38 0 - 20 ng/L Final   11/29/2024 02:57 AM 23 0 - 20 ng/L Final     BNP   Date/Time Value Ref Range Status   11/29/2024 02:57 AM 72 0 - 99 pg/mL Final     HGBA1C   Date/Time Value Ref Range Status   11/29/2024 02:57 AM 7.4 See comment % Final   06/24/2024 10:43 AM 7.9 4.2 - 6.5 % Final   09/19/2023 04:51 PM 8.0 4.2 - 6.5 % Final     LDLCALC   Date/Time Value Ref Range Status   11/29/2024 03:54 AM 63 <=99 mg/dL Final     Comment:                                 Near   Borderline      AGE      Desirable  Optimal    High     High     Very High     0-19 Y     0 - 109     ---    110-129   >/= 130     ----    20-24 Y     0 - 119     ---    120-159   >/= 160     ----      >24 Y     0 -  99   100-129  130-159   160-189     >/=190       VLDL    Date/Time Value Ref Range Status   11/29/2024 03:54 AM 27 0 - 40 mg/dL Final   09/28/2023 08:14 AM 43 0 - 40 mg/dL Final      Last I/O:  I/O last 3 completed shifts:  In: 832 (7.4 mL/kg) [P.O.:730; I.V.:102 (0.9 mL/kg)]  Out: 2355 (21.1 mL/kg) [Urine:2355 (0.6 mL/kg/hr)]  Weight: 111.8 kg     Past Cardiology Tests (Last 3 Years):  EKG:  ECG 12 Lead 11/30/2024 (Preliminary)      Electrocardiogram 12 Lead 11/29/2024 (Preliminary)      Electrocardiogram, 12-lead PRN ACS symptoms 11/29/2024 (Preliminary)      ECG 12 lead 11/29/2024 (Preliminary)      ECG 12 lead (Clinic Performed) 10/31/2024    Echo:  Transthoracic Echo (TTE) Complete 11/29/2024    Ejection Fractions:  EF   Date/Time Value Ref Range Status   11/29/2024 08:19 AM 32 %      Cath:  Cardiac Catheterization Procedure 11/30/2024      Cardiac Catheterization Procedure 11/29/2024    Stress Test:  No results found for this or any previous visit from the past 1095 days.    Cardiac Imaging:  No results found for this or any previous visit from the past 1095 days.      Inpatient Medications:  Scheduled medications   Medication Dose Route Frequency    aspirin  81 mg oral Daily    hydrALAZINE  50 mg oral TID    insulin lispro  0-5 Units subcutaneous TID AC    metoprolol tartrate  37.5 mg oral q6h    pantoprazole  40 mg oral Daily before breakfast    polyethylene glycol  17 g oral Daily    rosuvastatin  40 mg oral Nightly     PRN medications   Medication    acetaminophen    Or    acetaminophen    Or    acetaminophen    dextrose    dextrose    glucagon    glucagon    heparin    ipratropium-albuteroL    oxyCODONE     Continuous Medications   Medication Dose Last Rate    heparin  0-4,000 Units/hr 1,200 Units/hr (12/05/24 1200)       Physical Exam:  General: NAD, lying in bed  Skin: dry   Head/ neck: no JVD seen at 90 degrees  Cardiac: RRR, S1, S2   Pulm: CTAB, room air   Extremities: no LE edema. Right groin with IABP, soft, nontender, with bilateral LE slightly cool to  touch and +doppler DP pulse   Neuro: no focal neuro deficits   Psych: appropriate mood and behavior       Assessment/Plan   Lukasz Rodrigues is a 69 y.o. year old male patient with PMHx of CAD s/p stent 20 years ago, hypertension, hyperlipidemia, DM2 (A1C 7.9% 6/2024) admitted to the OSH 11/29/2024 with complaint of mid sternal chest pain not relieved by nitroglycerin at [facility].     11/29 patient developed CP not relieved with at home SL nitroglycerin, called EMS, EKG unchanged. Trop initially flat but uptrended to 1778. TTE showed EF 32%, taken for LHC and found severe, focal stenosis of the mid and distal left anterior descending artery, mid right coronary artery, and chronically totally occluded proximal left circumflex with left to left collaterals. Patient to be transferred to [facility] for cardiac MRI and evaluation for CABG.     11/30 IABP placed by Dr Jameson at [facility]     12/2 currently waiting CHIP meeting discussion regarding revascularization; IABP stable position, RLE warm with +doppler pulses     12/3-4 patient is considering CABG with Dr. Drew tomorrow vs. Transfer to OSH, IABP position is ok on the CXR    12/5  Pending transfer to [facility], awaiting bed availability; IABP slightly low on CXR but no need to adjust at this time, reviewed with IC fellow     Recommendations:  - daily CXR with IABP; 12/5 stable position on CXR, slightly low, will follow serial daily CXRs   - 12/5 IABP currently 1:1, assisted /67 (110), aug 142  - please maintain strict bedrest while IABP in place  - closely monitor groin insertion site and distal pulses  - May elevate HOB no greater than 30 degrees  - May log roll patient side to side without flexing involved extremity    Code Status:  Full Code    I spent 30 minutes in the professional and overall care of this patient.    Interventional Cardiology will follow.    CICU Fellow Pager: [phone] anytime  Endovascular /Limb Salvage Team Pager: [phone] for day coverage  (8am-5pm)  Interventional Cardiology Fellow Pager: [phone] (7AM-5PM) Night coverage: [facility] Cross Cover [id number]  Night coverage: [facility] [id number]     Jossy Hope, APRN-CNP

## 2024-12-06 ENCOUNTER — APPOINTMENT (OUTPATIENT)
Dept: RADIOLOGY | Facility: HOSPITAL | Age: 69
End: 2024-12-06
Payer: MEDICARE

## 2024-12-06 VITALS
HEIGHT: 71 IN | RESPIRATION RATE: 18 BRPM | OXYGEN SATURATION: 96 % | SYSTOLIC BLOOD PRESSURE: 124 MMHG | TEMPERATURE: 97.7 F | WEIGHT: 242.29 LBS | BODY MASS INDEX: 33.92 KG/M2 | DIASTOLIC BLOOD PRESSURE: 66 MMHG | HEART RATE: 69 BPM

## 2024-12-06 LAB
ABO GROUP (TYPE) IN BLOOD: NORMAL
ANION GAP SERPL CALC-SCNC: 12 MMOL/L (ref 10–20)
ANTIBODY SCREEN: NORMAL
BASOPHILS # BLD AUTO: 0.04 X10*3/UL (ref 0–0.1)
BASOPHILS NFR BLD AUTO: 0.3 %
BUN SERPL-MCNC: 31 MG/DL (ref 6–23)
CALCIUM SERPL-MCNC: 9 MG/DL (ref 8.6–10.6)
CHLORIDE SERPL-SCNC: 103 MMOL/L (ref 98–107)
CO2 SERPL-SCNC: 23 MMOL/L (ref 21–32)
CREAT SERPL-MCNC: 1.56 MG/DL (ref 0.5–1.3)
EGFRCR SERPLBLD CKD-EPI 2021: 48 ML/MIN/1.73M*2
EOSINOPHIL # BLD AUTO: 0.7 X10*3/UL (ref 0–0.7)
EOSINOPHIL NFR BLD AUTO: 6.1 %
ERYTHROCYTE [DISTWIDTH] IN BLOOD BY AUTOMATED COUNT: 13.2 % (ref 11.5–14.5)
GLUCOSE BLD MANUAL STRIP-MCNC: 175 MG/DL (ref 74–99)
GLUCOSE BLD MANUAL STRIP-MCNC: 190 MG/DL (ref 74–99)
GLUCOSE BLD MANUAL STRIP-MCNC: 227 MG/DL (ref 74–99)
GLUCOSE SERPL-MCNC: 176 MG/DL (ref 74–99)
HCT VFR BLD AUTO: 41.6 % (ref 41–52)
HGB BLD-MCNC: 14 G/DL (ref 13.5–17.5)
IMM GRANULOCYTES # BLD AUTO: 0.09 X10*3/UL (ref 0–0.7)
IMM GRANULOCYTES NFR BLD AUTO: 0.8 % (ref 0–0.9)
LYMPHOCYTES # BLD AUTO: 2.33 X10*3/UL (ref 1.2–4.8)
LYMPHOCYTES NFR BLD AUTO: 20.3 %
MAGNESIUM SERPL-MCNC: 1.99 MG/DL (ref 1.6–2.4)
MCH RBC QN AUTO: 28.6 PG (ref 26–34)
MCHC RBC AUTO-ENTMCNC: 33.7 G/DL (ref 32–36)
MCV RBC AUTO: 85 FL (ref 80–100)
MONOCYTES # BLD AUTO: 1.07 X10*3/UL (ref 0.1–1)
MONOCYTES NFR BLD AUTO: 9.3 %
NEUTROPHILS # BLD AUTO: 7.26 X10*3/UL (ref 1.2–7.7)
NEUTROPHILS NFR BLD AUTO: 63.2 %
NRBC BLD-RTO: 0 /100 WBCS (ref 0–0)
PLATELET # BLD AUTO: 172 X10*3/UL (ref 150–450)
POTASSIUM SERPL-SCNC: 4.2 MMOL/L (ref 3.5–5.3)
RBC # BLD AUTO: 4.9 X10*6/UL (ref 4.5–5.9)
RH FACTOR (ANTIGEN D): NORMAL
SODIUM SERPL-SCNC: 134 MMOL/L (ref 136–145)
UFH PPP CHRO-ACNC: 0.2 IU/ML
UFH PPP CHRO-ACNC: 0.2 IU/ML
UFH PPP CHRO-ACNC: 0.3 IU/ML
WBC # BLD AUTO: 11.5 X10*3/UL (ref 4.4–11.3)

## 2024-12-06 PROCEDURE — 83735 ASSAY OF MAGNESIUM: CPT

## 2024-12-06 PROCEDURE — 71045 X-RAY EXAM CHEST 1 VIEW: CPT | Performed by: RADIOLOGY

## 2024-12-06 PROCEDURE — 82947 ASSAY GLUCOSE BLOOD QUANT: CPT

## 2024-12-06 PROCEDURE — 2500000001 HC RX 250 WO HCPCS SELF ADMINISTERED DRUGS (ALT 637 FOR MEDICARE OP)

## 2024-12-06 PROCEDURE — 80048 BASIC METABOLIC PNL TOTAL CA: CPT

## 2024-12-06 PROCEDURE — 71045 X-RAY EXAM CHEST 1 VIEW: CPT

## 2024-12-06 PROCEDURE — 2500000004 HC RX 250 GENERAL PHARMACY W/ HCPCS (ALT 636 FOR OP/ED)

## 2024-12-06 PROCEDURE — 85520 HEPARIN ASSAY: CPT

## 2024-12-06 PROCEDURE — 85025 COMPLETE CBC W/AUTO DIFF WBC: CPT

## 2024-12-06 PROCEDURE — 2500000001 HC RX 250 WO HCPCS SELF ADMINISTERED DRUGS (ALT 637 FOR MEDICARE OP): Performed by: INTERNAL MEDICINE

## 2024-12-06 PROCEDURE — 2500000002 HC RX 250 W HCPCS SELF ADMINISTERED DRUGS (ALT 637 FOR MEDICARE OP, ALT 636 FOR OP/ED)

## 2024-12-06 PROCEDURE — 37799 UNLISTED PX VASCULAR SURGERY: CPT

## 2024-12-06 PROCEDURE — 99291 CRITICAL CARE FIRST HOUR: CPT

## 2024-12-06 PROCEDURE — 97116 GAIT TRAINING THERAPY: CPT | Mod: GP

## 2024-12-06 PROCEDURE — 86850 RBC ANTIBODY SCREEN: CPT

## 2024-12-06 PROCEDURE — 97530 THERAPEUTIC ACTIVITIES: CPT | Mod: GP

## 2024-12-06 PROCEDURE — 99291 CRITICAL CARE FIRST HOUR: CPT | Performed by: NURSE PRACTITIONER

## 2024-12-06 RX ORDER — HYDRALAZINE HYDROCHLORIDE 100 MG/1
100 TABLET, FILM COATED ORAL 3 TIMES DAILY
Status: DISCONTINUED | OUTPATIENT
Start: 2024-12-06 | End: 2024-12-06 | Stop reason: HOSPADM

## 2024-12-06 ASSESSMENT — COGNITIVE AND FUNCTIONAL STATUS - GENERAL
DRESSING REGULAR UPPER BODY CLOTHING: A LITTLE
MOBILITY SCORE: 9
TURNING FROM BACK TO SIDE WHILE IN FLAT BAD: TOTAL
DAILY ACTIVITIY SCORE: 20
HELP NEEDED FOR BATHING: A LITTLE
MOBILITY SCORE: 9
TURNING FROM BACK TO SIDE WHILE IN FLAT BAD: TOTAL
HELP NEEDED FOR BATHING: A LITTLE
HELP NEEDED FOR BATHING: A LITTLE
STANDING UP FROM CHAIR USING ARMS: TOTAL
DRESSING REGULAR UPPER BODY CLOTHING: A LITTLE
STANDING UP FROM CHAIR USING ARMS: TOTAL
MOVING TO AND FROM BED TO CHAIR: TOTAL
MOVING TO AND FROM BED TO CHAIR: TOTAL
CLIMB 3 TO 5 STEPS WITH RAILING: TOTAL
CLIMB 3 TO 5 STEPS WITH RAILING: TOTAL
DRESSING REGULAR UPPER BODY CLOTHING: A LITTLE
TURNING FROM BACK TO SIDE WHILE IN FLAT BAD: TOTAL
MOBILITY SCORE: 10
PERSONAL GROOMING: A LITTLE
TOILETING: A LITTLE
DRESSING REGULAR UPPER BODY CLOTHING: A LITTLE
PERSONAL GROOMING: A LITTLE
MOVING TO AND FROM BED TO CHAIR: TOTAL
WALKING IN HOSPITAL ROOM: TOTAL
DRESSING REGULAR UPPER BODY CLOTHING: A LITTLE
CLIMB 3 TO 5 STEPS WITH RAILING: TOTAL
STANDING UP FROM CHAIR USING ARMS: TOTAL
TURNING FROM BACK TO SIDE WHILE IN FLAT BAD: TOTAL
STANDING UP FROM CHAIR USING ARMS: TOTAL
DAILY ACTIVITIY SCORE: 20
MOVING TO AND FROM BED TO CHAIR: TOTAL
CLIMB 3 TO 5 STEPS WITH RAILING: TOTAL
TURNING FROM BACK TO SIDE WHILE IN FLAT BAD: TOTAL
TOILETING: A LITTLE
PERSONAL GROOMING: A LITTLE
WALKING IN HOSPITAL ROOM: TOTAL
MOVING TO AND FROM BED TO CHAIR: TOTAL
WALKING IN HOSPITAL ROOM: TOTAL
WALKING IN HOSPITAL ROOM: TOTAL
HELP NEEDED FOR BATHING: A LITTLE
TOILETING: A LITTLE
PERSONAL GROOMING: A LITTLE
HELP NEEDED FOR BATHING: A LITTLE
DAILY ACTIVITIY SCORE: 20
TOILETING: A LITTLE
PERSONAL GROOMING: A LITTLE
MOBILITY SCORE: 9
DAILY ACTIVITIY SCORE: 20
CLIMB 3 TO 5 STEPS WITH RAILING: TOTAL
MOBILITY SCORE: 9
WALKING IN HOSPITAL ROOM: TOTAL
DAILY ACTIVITIY SCORE: 20
HELP NEEDED FOR BATHING: A LITTLE
TOILETING: A LITTLE
MOVING FROM LYING ON BACK TO SITTING ON SIDE OF FLAT BED WITH BEDRAILS: A LITTLE
CLIMB 3 TO 5 STEPS WITH RAILING: TOTAL
MOVING TO AND FROM BED TO CHAIR: TOTAL
DRESSING REGULAR UPPER BODY CLOTHING: A LITTLE
MOBILITY SCORE: 9
WALKING IN HOSPITAL ROOM: TOTAL
STANDING UP FROM CHAIR USING ARMS: TOTAL
CLIMB 3 TO 5 STEPS WITH RAILING: TOTAL
WALKING IN HOSPITAL ROOM: A LITTLE
DAILY ACTIVITIY SCORE: 20
PERSONAL GROOMING: A LITTLE
TOILETING: A LITTLE
MOVING TO AND FROM BED TO CHAIR: TOTAL
WALKING IN HOSPITAL ROOM: TOTAL
PERSONAL GROOMING: A LITTLE
TURNING FROM BACK TO SIDE WHILE IN FLAT BAD: TOTAL
PERSONAL GROOMING: A LITTLE
DRESSING REGULAR UPPER BODY CLOTHING: A LITTLE
TURNING FROM BACK TO SIDE WHILE IN FLAT BAD: TOTAL
MOBILITY SCORE: 9
STANDING UP FROM CHAIR USING ARMS: TOTAL
HELP NEEDED FOR BATHING: A LITTLE
TOILETING: A LITTLE
STANDING UP FROM CHAIR USING ARMS: TOTAL
TURNING FROM BACK TO SIDE WHILE IN FLAT BAD: TOTAL
TURNING FROM BACK TO SIDE WHILE IN FLAT BAD: TOTAL
CLIMB 3 TO 5 STEPS WITH RAILING: TOTAL
WALKING IN HOSPITAL ROOM: TOTAL
CLIMB 3 TO 5 STEPS WITH RAILING: TOTAL
MOVING TO AND FROM BED TO CHAIR: TOTAL
DRESSING REGULAR UPPER BODY CLOTHING: A LITTLE
MOVING TO AND FROM BED TO CHAIR: TOTAL
TOILETING: A LITTLE
HELP NEEDED FOR BATHING: A LITTLE

## 2024-12-06 ASSESSMENT — PAIN - FUNCTIONAL ASSESSMENT
PAIN_FUNCTIONAL_ASSESSMENT: 0-10

## 2024-12-06 ASSESSMENT — PAIN SCALES - GENERAL
PAINLEVEL_OUTOF10: 0 - NO PAIN

## 2024-12-06 NOTE — DISCHARGE INSTRUCTIONS
Dear Mr. Rodrigues,      You were admitted to [facility] for further evaluation and management of a heart attack due to blockages of coronary arteries in your heart.  You initially went to [facility] for chest pain that was not relieved by nitroglycerin.  You had an echocardiogram done which showed that you had reduced ejection fraction of your heart.  You also underwent cardiac catheterization which showed blockages and disease in multiple of coronary arteries.  You were then transferred to the CICU at [facility] for further evaluation and management of your condition.  While you were here, we made adjustments to your medications and you also had a balloon pump placed which helped to support your heart and relieve your chest pain. You also had a nuclear medicine PET scan and CT scan done of your chest in preparation of your surgery, and you were medically optimized for surgery.     Per your request, you are transferred to [facility] for further evaluation and management of your condition.     Thank you for the opportunity to participate in your care.      Sincerely,   Your  Care Team

## 2024-12-06 NOTE — PROGRESS NOTES
Subjective Data:  Patient seen this AM, denies any CP/SOB or RLE numbness/ tingling.     Overnight Events:    None     Objective Data:  Last Recorded Vitals:  Vitals:    12/06/24 1100 12/06/24 1200 12/06/24 1225 12/06/24 1300   BP:       Pulse: 62 66  69   Resp: 10 19  18   Temp:   36.4 °C (97.5 °F)    TempSrc:   Temporal    SpO2: 96% 95%  98%   Weight:       Height:           Last Labs:  CBC - 12/6/2024:  2:21 AM  11.5 14.0 172    41.6      CMP - 12/6/2024:  2:21 AM  9.0 6.5 20 --- 0.7   5.1 3.6 23 93      PTT - 11/29/2024: 11:51 PM  1.0   11.5 31     TROPHS   Date/Time Value Ref Range Status   11/30/2024 06:33 PM 3,403 0 - 53 ng/L Final     Comment:     Previous result verified on 11/30/2024 0117 on specimen/case [id number] called with component TRPHS for procedure Troponin I, High Sensitivity with value 8,460 ng/L.   11/30/2024 02:25 AM 7,722 0 - 53 ng/L Final     Comment:     Previous result verified on 11/30/2024 0117 on specimen/case [id number] called with component TRPHS for procedure Troponin I, High Sensitivity with value 8,460 ng/L.   11/29/2024 11:51 PM 8,460 0 - 53 ng/L Final   11/29/2024 08:26 AM 1,778 0 - 20 ng/L Final   11/29/2024 03:54 AM 38 0 - 20 ng/L Final   11/29/2024 02:57 AM 23 0 - 20 ng/L Final     BNP   Date/Time Value Ref Range Status   11/29/2024 02:57 AM 72 0 - 99 pg/mL Final     HGBA1C   Date/Time Value Ref Range Status   11/29/2024 02:57 AM 7.4 See comment % Final   06/24/2024 10:43 AM 7.9 4.2 - 6.5 % Final   09/19/2023 04:51 PM 8.0 4.2 - 6.5 % Final     LDLCALC   Date/Time Value Ref Range Status   11/29/2024 03:54 AM 63 <=99 mg/dL Final     Comment:                                 Near   Borderline      AGE      Desirable  Optimal    High     High     Very High     0-19 Y     0 - 109     ---    110-129   >/= 130     ----    20-24 Y     0 - 119     ---    120-159   >/= 160     ----      >24 Y     0 -  99   100-129  130-159   160-189     >/=190       VLDL   Date/Time Value Ref  Range Status   11/29/2024 03:54 AM 27 0 - 40 mg/dL Final   09/28/2023 08:14 AM 43 0 - 40 mg/dL Final      Last I/O:  I/O last 3 completed shifts:  In: 1651.6 (15 mL/kg) [P.O.:1080; I.V.:571.6 (5.2 mL/kg)]  Out: 2450 (22.3 mL/kg) [Urine:2450 (0.6 mL/kg/hr)]  Weight: 109.9 kg     Past Cardiology Tests (Last 3 Years):  EKG:  ECG 12 Lead 11/30/2024 (Preliminary)      Electrocardiogram 12 Lead 11/29/2024 (Preliminary)      Electrocardiogram, 12-lead PRN ACS symptoms 11/29/2024 (Preliminary)      ECG 12 lead 11/29/2024 (Preliminary)      ECG 12 lead (Clinic Performed) 10/31/2024    Echo:  Transthoracic Echo (TTE) Complete 11/29/2024    Ejection Fractions:  EF   Date/Time Value Ref Range Status   11/29/2024 08:19 AM 32 %      Cath:  Cardiac Catheterization Procedure 11/30/2024      Cardiac Catheterization Procedure 11/29/2024    Stress Test:  No results found for this or any previous visit from the past 1095 days.    Cardiac Imaging:  No results found for this or any previous visit from the past 1095 days.      Inpatient Medications:  Scheduled medications   Medication Dose Route Frequency    aspirin  81 mg oral Daily    hydrALAZINE  100 mg oral TID    insulin lispro  0-5 Units subcutaneous TID AC    metoprolol tartrate  37.5 mg oral q6h    pantoprazole  40 mg oral Daily before breakfast    polyethylene glycol  17 g oral Daily    rosuvastatin  40 mg oral Nightly     PRN medications   Medication    acetaminophen    Or    acetaminophen    Or    acetaminophen    dextrose    dextrose    glucagon    glucagon    heparin    ipratropium-albuteroL    oxyCODONE     Continuous Medications   Medication Dose Last Rate    heparin  0-4,000 Units/hr 1,800 Units/hr (12/06/24 1009)       Physical Exam:  General: NAD, lying in bed  Skin: dry   Head/ neck: no JVD seen at 90 degrees  Cardiac: RRR, S1, S2   Pulm: CTAB, room air   Extremities: no LE edema. Right groin with IABP, soft, nontender, with bilateral LE slightly cool to touch and  +doppler DP pulse   Neuro: no focal neuro deficits   Psych: appropriate mood and behavior       Assessment/Plan   Lukasz Rodrigues is a 69 y.o. year old male patient with PMHx of CAD s/p stent 20 years ago, hypertension, hyperlipidemia, DM2 (A1C 7.9% 6/2024) admitted to the OSH 11/29/2024 with complaint of mid sternal chest pain not relieved by nitroglycerin at [facility].     11/29 patient developed CP not relieved with at home SL nitroglycerin, called EMS, EKG unchanged. Trop initially flat but uptrended to 1778. TTE showed EF 32%, taken for LHC and found severe, focal stenosis of the mid and distal left anterior descending artery, mid right coronary artery, and chronically totally occluded proximal left circumflex with left to left collaterals. Patient to be transferred to [facility] for cardiac MRI and evaluation for CABG.     11/30 IABP placed by Dr Jameson at [facility]     12/2 currently waiting CHIP meeting discussion regarding revascularization; IABP stable position, RLE warm with +doppler pulses     12/3-4 patient is considering CABG with Dr. Drew tomorrow vs. Transfer to OSH, IABP position is ok on the CXR    12/5  Pending transfer to [facility], awaiting bed availability; IABP slightly low on CXR but no need to adjust at this time, reviewed with IC fellow     Recommendations:  - daily CXR with IABP; 12/6 CXR reviewed with Dr Piña, appears low but no need to reposition at this point, will monitor positioning closely   - 12/6 IABP currently 1:1, assisted /61 (104), aug 140  - please maintain strict bedrest while IABP in place  - closely monitor groin insertion site and distal pulses  - May elevate HOB no greater than 30 degrees  - May log roll patient side to side without flexing involved extremity    Code Status:  Full Code    I spent 30 minutes in the professional and overall care of this patient.    Interventional Cardiology will follow.    CICU Fellow Pager: [phone] anytime  Endovascular /Limb Salvage Team Pager:  [phone] for day coverage (8am-5pm)  Interventional Cardiology Fellow Pager: [phone] (7AM-5PM) Night coverage: [facility] Cross Cover [id number]  Night coverage: [facility] [id number]     Jossy Hope, APRN-CNP

## 2024-12-06 NOTE — PROGRESS NOTES
Physical Therapy    Physical Therapy Treatment    Patient Name: Lukasz Rodrigues  MRN: [id number]  [facility]  [facility] 12/12-A  Today's Date: 12/6/2024  Time Calculation  Start Time: 1410  Stop Time: 1454  Time Calculation (min): 44 min    Assessment/Plan   PT Assessment  Barriers to Discharge: femoral IABP  End of Session Communication: Bedside nurse  End of Session Patient Position: Bed, 3 rail up, Alarm off, not on at start of session     PT Plan  Treatment/Interventions: Bed mobility, Transfer training, Gait training, Balance training, Strengthening, Endurance training, Therapeutic exercise, Therapeutic activity  PT Plan: Ongoing PT  PT Frequency: 5 times per week  PT Discharge Recommendations:  (unable to determine until s/p removal of femoral IABP)  PT Recommended Transfer Status: Assist x1, Assistive device    General Visit Information:   PT  Visit  PT Received On: 12/06/24  Response to Previous Treatment: Patient with no complaints from previous session.  General  Family/Caregiver Present: Yes  Caregiver Feedback: Pt's family present and supportive  Prior to Session Communication: Bedside nurse  Patient Position Received: Bed, 3 rail up, Alarm off, not on at start of session  General Comment: Pt pleasant and motivated for OOB mobility. R groin site checked throughout and stable. Pt with slightly increased shakiness with ambulating this date.    Subjective     Precautions:  Precautions  Medical Precautions: Cardiac precautions, Fall precautions  Precautions Comment: R LE IABP precautions: No R hip FLEX >30 degrees, No excessive R hip EXT.    Lines/Tubes:   Telemetry   Arterial line   R femoral IABP   PIV     IABP     Location: R femoral access Site Appearance: dark old blood around insertion site, unchanged throughout   Support: 1:1    Vitals Session Pre PT During PT Post PT   Heart Rate 70  72   Resp 21  23   SpO2 98  99   BP- IABP console 125/61 Lowest on IABP 90s/60s but pt asymptomatic 136/63      Objective     Pain:  Pain Assessment  Pain Assessment: 0-10  0-10 (Numeric) Pain Score:  (Pt denied pain except chronic pain)    Cognition:  Cognition  Overall Cognitive Status: Within Functional Limits  Arousal/Alertness: Appropriate responses to stimuli  Orientation Level: Oriented X4  Following Commands: Follows all commands and directions without difficulty    Activity Tolerance:  Activity Tolerance  Early Mobility/Exercise Safety Screen: Proceed with mobilization - No exclusion criteria met    Treatments:  Therapeutic Exercise  Therapeutic Exercise Performed: Yes  Therapeutic Exercise Activity 1: Pt completed B LE therex at 60 degree tilt L LE marches x 5 reps, and R LE minin SLR x 5. B HHA with minAx2.    Ambulation/Gait Training  Ambulation/Gait Training Performed: Yes  Ambulation/Gait Training 1  Surface 1: Level tile  Device 1: Rolling walker  Assistance 1: Minimum assistance (x2 person; cues for sequencing)  Quality of Gait 1: Forward flexed posture (heavy reliance on UEs for support while ambulating)  Comments/Distance (ft) 1: 40 ft  Transfers  Transfer: Yes  Transfer 1  Transfer From 1: Bed to  Transfer to 1: Tilt table  Technique 1: Lateral (with draw sheet)  Transfer Level of Assistance 1: Dependent (x4 person; cues for sequencing)  Transfers 2  Transfer From 2: Tilt table to  Transfer to 2: Bed  Technique 2: Lateral (with draw sheet)  Transfer Level of Assistance 2: Dependent (x 4 person; cues for sequencing)  Transfers 3  Transfer From 3: Tilt table to  Transfer to 3: Stand  Transfer Device 3: Walker (once in full standing)  Transfer Level of Assistance 3: Minimum assistance (x2 person once full standing; transfer into standing performed via tilt table)  Trials/Comments 3: increased from flat supine on tilt table to full standing by 15 degree increments. Hemodynamic response assessed for 3-5 minutes then progressed as pt was stable.  Transfers 4  Transfer From 4: Stand to  Transfer to 4: Tilt  table  Transfer Level of Assistance 4: Dependent (via tilt table)  Trials/Comments 4: returned from standing to supine on tilt table by 30 degree increments and hemodynamic response assessed    Outcome Measures:  [facility] Basic Mobility  Turning from your back to your side while in a flat bed without using bedrails: A little  Moving from lying on your back to sitting on the side of a flat bed without using bedrails: Total  Moving to and from bed to chair (including a wheelchair): Total  Standing up from a chair using your arms (e.g. wheelchair or bedside chair): Total  To walk in hospital room: A little  Climbing 3-5 steps with railing: Total  Basic Mobility - Total Score: 10    Confusion Assessment Method-ICU (CAM-ICU)  Feature 1: Acute Onset or Fluctuating Course: Negative  Overall CAM-ICU: Negative    FSS-ICU  Ambulation: Walks <50 feet with any assistance x1 or walks any distance with assistance x2 people  Rolling: Minimal assistance (performs 75% or more of task)  Sitting: Unable to perform  Transfer Sit-to-Stand: Unable to perform  Transfer Supine-to-Sit: Unable to perform  Total Score: 5      Early Mobility/Exercise Safety Screen: Proceed with mobilization - No exclusion criteria met  ICU Mobility Scale: Walking with assistance of 1 person [8]  E = Exercise and Early Mobility  Early Mobility/Exercise Safety Screen: Proceed with mobilization - No exclusion criteria met  ICU Mobility Scale: Walking with assistance of 1 person    Education Documentation  Precautions, taught by Alona Joya, PT at 12/6/2024  3:54 PM.  Learner: Patient  Readiness: Acceptance  Method: Explanation  Response: Needs Reinforcement  Comment: mobility precautionds, R femoral IABP precautions    Mobility Training, taught by Alona Joya PT at 12/6/2024  3:54 PM.  Learner: Patient  Readiness: Acceptance  Method: Explanation  Response: Needs Reinforcement  Comment: mobility precautionds, R femoral IABP precautions    Education  Comments  No comments found.      Encounter Problems       Encounter Problems (Active)       Balance       Pt will be able to tolerate standing dynamic activity with unilateral UE support >10 minutes with supervision (Progressing)       Start:  12/04/24    Expected End:  12/18/24               Mobility       Patient will ambulate with FWW >1000 ft with supervision (Progressing)       Start:  12/04/24    Expected End:  12/18/24               PT Transfers       Patient will roll with supervision. (Progressing)       Start:  12/04/24    Expected End:  12/18/24               Pain - Adult            Signed by Alona Joya DPT

## 2024-12-06 NOTE — PROGRESS NOTES
Cardiac ICU Progress Note, 12/6/2024    Admit Date: 11/29/2024   Hospital Length of Stay: 7   ICU Length of Stay: 6d 11h     History of Present Illness  Lukasz Rodrigues is a 69 y.o. male on day 6d 11h of admission for NSTEMI (non-ST elevated myocardial infarction) (Multi).    Interval History  NAEON    Subjective  This AM patient feels well and denies CP/SOB, palpitations, abd pain, or new LE edema or other complaints this morning    States he would still prefer to wait for transfer to [facility] for his CABG or may like to consider transfer to [facility].  Otherwise has no complaints.     Objective    Vitals  Visit Vitals  /66   Pulse 64   Temp 35.8 °C (96.4 °F)   Resp 13        Invasive Hemodynamics   Most Recent Range Past 24hrs   BP (Art) 124/52 Arterial Line BP 1  Min: 124/52  Max: 152/82   MAP(Art) 83 mmHg Arterial Line MAP 1 (mmHg)   Min: 83 mmHg  Max: 119 mmHg     I/O    Intake/Output Summary (Last 24 hours) at 12/6/2024 0628  Last data filed at 12/6/2024 0545  Gross per 24 hour   Intake 1309.57 ml   Output 1300 ml   Net 9.57 ml        Physical Exam  Physical Exam  Vitals reviewed.   Constitutional:       General: He is awake. He is not in acute distress.     Appearance: Normal appearance. He is obese. He is not ill-appearing or toxic-appearing.   HENT:      Head: Normocephalic and atraumatic.      Nose: Nose normal.      Mouth/Throat:      Mouth: Mucous membranes are moist.   Neck:      Comments: RIJ catheter in place without evidence of drainage, erythema or soreness to touch   Cardiovascular:      Rate and Rhythm: Normal rate and regular rhythm.      Pulses: Normal pulses.           Carotid pulses are 2+ on the right side and 2+ on the left side.     Heart sounds: Normal heart sounds.      Arteriovenous access: Left arteriovenous access is present.     Comments: Tracep itting edema to upper-mid shins    IABP pump sounds heard with ausculatation  Pulmonary:      Effort: Pulmonary effort is normal. No respiratory  distress.      Breath sounds: Normal breath sounds.   Abdominal:      Palpations: Abdomen is soft.   Genitourinary:     Comments: Right femoral IABP insertion site: No bleeding, swelling, hematoma, or tenderness.  Musculoskeletal:         General: No swelling.      Right lower leg: No edema.      Left lower leg: No edema.   Skin:     General: Skin is warm and dry.   Neurological:      Mental Status: He is alert and oriented to person, place, and time.   Psychiatric:         Behavior: Behavior is cooperative.       Labs    CBC:  Results from last 7 days   Lab Units 12/06/24  0221 12/05/24  0408 12/04/24  0350   WBC AUTO x10*3/uL 11.5* 12.3* 10.4   HEMOGLOBIN g/dL 14.0 14.4 14.1   PLATELETS AUTO x10*3/uL 172 169 173     BMP:  Results from last 7 days   Lab Units 12/06/24  0221 12/05/24  0408 12/04/24  0350 12/02/24  0304 12/01/24  0619 12/01/24  0133 11/30/24  1700 11/30/24  0541   SODIUM mmol/L 134* 133* 134*   < > 135*   < > 134* 136   POTASSIUM mmol/L 4.2 4.2 4.4   < > 4.5   < > 4.9 4.3   CHLORIDE mmol/L 103 102 101   < > 101   < > 101 105   CO2 mmol/L 23 20* 22   < > 25   < > 28 21   ANION GAP mmol/L 12 15 15   < > 14   < > 10 14   BUN mg/dL 31* 30* 25*   < > 29*   < > 26* 25*   CREATININE mg/dL 1.56* 1.46* 1.46*   < > 2.40*   < > 1.54* 1.39*   EGFR mL/min/1.73m*2 48* 52* 52*   < > 29*   < > 49* 56*   CALCIUM mg/dL 9.0 9.0 9.2   < > 8.6   < > 9.0 8.8   PHOSPHORUS mg/dL  --   --   --   --  5.1*  --  4.3 4.2   MAGNESIUM mg/dL 1.99 1.82 1.81   < >  --    < > 1.95 2.29   URIC ACID mg/dL  --  6.4  --   --   --   --   --   --    GLUCOSE mg/dL 176* 182* 179*   < > 184*   < > 168* 171*    < > = values in this interval not displayed.     LFT:  Results from last 7 days   Lab Units 12/05/24  0408 12/04/24  0350 12/03/24  0523   AST U/L 20 18 13   ALT U/L 23 20 14   ALK PHOS U/L 93 87 70   BILIRUBIN TOTAL mg/dL 0.7 0.6 0.6   BILIRUBIN DIRECT mg/dL 0.2 0.2 0.1     Cardiac:  Results from last 7 days   Lab Units 11/30/24  4642  11/30/24 0225 11/29/24  2351   TROPHSCMC ng/L 3,403* 7,722* 8,460*     Coag:  Results from last 7 days   Lab Units 11/29/24  2351 11/29/24  0826   PROTIME seconds 11.5 11.6   APTT seconds 31 193*   INR  1.0 1.0     UA:   Results from last 7 days   Lab Units 12/01/24  1211   COLOR U  Light-Yellow   PH U  5.5   SPEC GRAV UR  1.021   PROTEIN U mg/dL 20 (TRACE)   BLOOD UR  NEGATIVE   NITRITE U  NEGATIVE   WBC UR /HPF 6-10*     ABG:  Results from last 7 days   Lab Units 12/03/24  1244 12/03/24  0522 12/02/24  1828 12/01/24  1535 11/30/24 2025   POCT PH, ARTERIAL pH  --   --   --   --  7.45*   POCT PO2, ARTERIAL mm Hg  --   --   --   --  77*   POCT PCO2, ARTERIAL mm Hg  --   --   --   --  37*   FIO2 % 21 21 21   < > 28    < > = values in this interval not displayed.     VBG:  Results from last 7 days   Lab Units 11/29/24 2006   POCT PH, VENOUS pH 7.45*   POCT PCO2, VENOUS mm Hg 35*       Cardiac Data    EKG  Encounter Date: 11/29/24   Electrocardiogram, 12-lead PRN ACS symptoms   Result Value    Ventricular Rate 87    Atrial Rate 87    NY Interval 200    QRS Duration 116    QT Interval 398    QTC Calculation(Bazett) 478    P Axis 55    R Axis 56    T Axis 130    QRS Count 14    Q Onset 209    P Onset 109    P Offset 154    T Offset 408    QTC Fredericia 450    Narrative    Sinus rhythm with frequent Premature ventricular complexes  ST & T wave abnormality, consider anterolateral ischemia  Prolonged QT  Abnormal ECG  When compared with ECG of 30-NOV-2024 18:30,  No significant change was found  Confirmed by ThalDoroteo ([id number]) on 12/4/2024 1:14:28 PM      Transthoracic Echo (TTE) 11/29/2024  1. Apical septal segment, apical lateral segment, apical anterior segment, and apex are abnormal.  2. The left ventricular systolic function is moderately decreased, with a Mccartney's biplane calculated ejection fraction of 32%.  3. Abnormal left venticular wall motion.  4. Spectral Doppler shows a Grade I (impaired relaxation  pattern) of left ventricular diastolic filling with normal left atrial filling pressure.  5. Left ventricular cavity size is moderately dilated.  6. However, LV cavity size is normal, when indexed to body surface area.  7. There is normal right ventricular global systolic function.     Coronary Angiogram 11/29/2024  1. Ischemic cardiomyopathy.  2. Severe, focal stenosis of the mid and distal left anterior descending artery, mid right coronary artery, and chronically totally occluded proximal left circumflex with left to left collaterals.  3. Guideline directed medical therapy for ACS and heart failure.  4. Will transfer to [facility] for evaluation for bypass surgery.    Imaging  XR chest 1 view    Result Date: 11/29/2024  Hypoventilatory changes with bibasilar atelectasis. No consolidation.   MACRO: None   Signed by: Topher Coyle 11/29/2024 3:32 AM Dictation workstation:   [id number]       Inpatient Medications    Continuous medications  heparin, 0-4,000 Units/hr, Last Rate: 1,800 Units/hr (12/06/24 0503)          Scheduled medications  aspirin, 81 mg, oral, Daily  hydrALAZINE, 50 mg, oral, TID  insulin lispro, 0-5 Units, subcutaneous, TID AC  metoprolol tartrate, 37.5 mg, oral, q6h  pantoprazole, 40 mg, oral, Daily before breakfast  polyethylene glycol, 17 g, oral, Daily  rosuvastatin, 40 mg, oral, Nightly        PRN medications  PRN medications: acetaminophen **OR** acetaminophen **OR** acetaminophen, dextrose, dextrose, glucagon, glucagon, heparin, ipratropium-albuteroL, oxyCODONE      Assessment & Plan  Lukasz Rodrigues is a 69 y.o. male with a PMH of CAD s/p stent 20 years ago, hypertension, hyperlipidemia, DM2 (A1C 7.4% 11/29/2024) on 13h of CICU admission for NSTEMI.     Admitted to the OSH 11/29/2024 with complaint of mid sternal chest pain not relieved by nitroglycerin. TTE showed LVEF of 32% and abnormal left ventricular wall motion. L heart cath on 11/29, found severe, focal stenosis of  the mid and distal LAD, mid RCA, and  proximal left Cx with left to left collaterals. He was transferred to the CICU for further evaluation and management.   -IABP placed 11/30.   -Racine Josephine catheter placed on 12/1 and then removed 12/3.   -PET CT only with small apical perfusion defect so no viability study performed. Will likely require CABG however prefers [facility] so pending transfer  -Non-con chest CT 12/5/24 showing b/l pleural effusions, L>R and bibasilar atelectasis though pt continues to be without respiratory sx       Updates 12/6/24:  Pt seen by Cardiac Surgery and plan would likely have been for CABG with like 3 grafts though target is difficult given occluded Lcx, prefers to wait for eval for surgery at [facility] after transfer. At this time Cardiac Surgery has signed off as pt does not wish to undergo surgery at [facility].   Increased hydralazine to 100mg TID for afterload reduction   Pending transfer to [facility]. Called transfer center this morning, and still awaiting bed, unclear of length of wait at this time. Most recent imaging pushed over to F       Neurologic  - AFSHAN       Cardiovascular  #NSTEMI  - Troponin peaked 8460, down to 3400  - Brilinta loaded on 11/29  - Coronary angiogram 11/29/2024: severe, focal stenosis of the mid and distal LAD, mid RCA, and  proximal left Cx with left to left collaterals  - Patient had recurrent chest pain 11/30 afternoon. Now relieved this AM  - Had been on nitroglycerin gtt for chest pain but chest pain resolved   :: Currently on HYD for afterload reduction   - IABP placed 12/1   Plan:  Increased Hydralazine 100mg TID for afterload reduction   C/w rosuvastatin 40mg daily   C/w Aspirin 81 mg daily   Continue metoprolol 37.5mg   On Heparin gtt for AC  Holding Brilinta  Holding ACEi/ARB       #HFrEF (EF 38% 12/4/24)  #Ischemic Cardiomyopathy  :: Nm PET CT Myocardial 12/4/24 showed mild improvement in EF to 38%  - TTE 11/29: EF 32% with LV wall motion abnormalities  - No  previous echo on file  Plan:  Continue Metoprolol tartrate 37.5 mg q6h   Start rest of GDMT when appropriate     #HTN  - Home meds: coreg 3.125, losartan 25mg (both on hold)       Respiratory  - AFSHAN       Gastrointestinal  - AFSHAN       Renal  #BRUNILDA (improving)  -Cr to 1.56 today, baseline appears to be ~1.23. Cr 1.77 on admission  - Patient euvolemic on exam and CXR  - Possibly 2/2 PAT (LHC + IABP insert)   Plan:  Will continue to monitor  Avoid nephrotoxins      Hematology  #Leukocytosis (resolving)  :: WBCs borderline elevated to 11.5 today  :: No evidence of source of infection  :: May have been  inflammatory reaction iso IABP insertion  Plan:  Continue to monitor       Endocrine  #T2DM (A1c 7.4 11/29/24)  - Baseline on home metformin  Plan:  On SSI #1  Plan for SGLT2i on discharge (for HFrEF)    Dispo: pending possible [facility] transfer, currently no bed availability.    Fluids: PRN  Electrolytes: PRN  Nutrition: Cardiac  Lines: PRN  DVT prophylaxis: heparin gtt  GI prophylaxis: Protonix    Code Status: Full Code (Confirmed on admission)   Emergency Contact / NOK: Extended Emergency Contact Information  Primary Emergency Contact: Daya Suazo  Mobile Phone: [phone]  Relation: Daughter   needed? No        Sofiya Mccoy MD  PGY1 Internal Medicine    12/6/2024

## 2024-12-06 NOTE — PROGRESS NOTES
I had a very long discussion yesterday with the patient and his daughters about his disease and what would be his surgical plan.  I first saw him on Saturday 11/30 and he had opted not to discuss surgery or options with me because he had decided to transfer to [facility].  Since it has been taking a long time for him to get a bed at [facility] he and his family decided they would like to discuss surgery options yesterday.  I showed them the cardiac cath and his most recent echo as well as the viability study.  I reviewed the cath in detail and described that he has a diffusely diseased LAD, and occluded circumflex that may have a graftable OM but it will be hard to know until surgery and tight disease in his RCA.  He has a reduced ejection fraction with a dilated and thinned out left ventricle.  His ejection fraction does appear on the viability study to be slightly improved from admission.  I answered all their questions and explained that given his low EF and poor targets that he will have limited durability to his grafting and that he may not have a lot of improvement in his function.  I do think that grafting him will treat his chest pain.  He is at relatively high risk for this surgery given his low EF, poor targets, and the fact that he has now been immobile for over 5 days.  I also explained that by choosing to not be evaluated by cardiac surgery, we could not schedule him for surgery so now at this point the earliest we could get him on the OR schedule would be next Monday or Tuesday.  The family and patient stated that they wanted to think about it and today decided to wait for transfer to [facility].      Cardiac Surgery will sign off at this time.  If the patient does not get transferred please re-consult the surgeon on call that day.      Georgie Drew MD  Cardiac Surgery   12/05/24  8:40 PM

## 2024-12-07 NOTE — DISCHARGE SUMMARY
Discharge Diagnosis  NSTEMI (non-ST elevated myocardial infarction) (Multi)    Issues Requiring Follow-Up  [ ] CABG eval  [ ] Will eventually need optimized GDMT in the setting of newly diagnosed HFrEF prior to discharge  [ ] Home Losartan on hold, can consider Entresto later      Active Medications at Time of Discharge:   Continuous medications    Continuous Medications   heparin, 0-4,000 Units/hr, Last Rate: 1,800 Units/hr (12/06/24 0503)            Scheduled medications    Scheduled Medications   aspirin, 81 mg, oral, Daily  hydrALAZINE, 50 mg, oral, TID  insulin lispro, 0-5 Units, subcutaneous, TID AC  metoprolol tartrate, 37.5 mg, oral, q6h  pantoprazole, 40 mg, oral, Daily before breakfast  polyethylene glycol, 17 g, oral, Daily  rosuvastatin, 40 mg, oral, Nightly            PRN medications  PRN Medications   PRN medications: acetaminophen **OR** acetaminophen **OR** acetaminophen, dextrose, dextrose, glucagon, glucagon, heparin, ipratropium-albuteroL, oxyCODONE           Test Results Pending At Discharge  Pending Labs       No current pending labs.            Hospital Course  Lukasz Rodrigues is a 69 y.o. male with a PMH of CAD s/p stent 20 years ago, hypertension, hyperlipidemia, DM2 (A1C 7.9% 6/2024) on 13h of CICU admission for NSTEMI.      Admitted to the OSH 11/29/2024 with complaint of mid sternal chest pain not relieved by nitroglycerine. TTE showed LVEF of 32% and abnormal left ventricular wall motion. L heart cath on 11/29, found severe, focal stenosis of the mid and distal LAD, mid RCA, and  proximal left Cx with left to left collaterals. He was transferred to the CICU for further evaluation and management.     Started on nitroglycerin gtt later weaned, and CTS consulted for CABG evaluation. On 11/30 PM, Patient had recurrent chest pain. Nitroglycerin gtt restarted and uptitrated without significant improvement. IABP placed overnight. [place] Josephine catheter placed 12/1. CHIP meeting was held to discuss  revascularization given difficult anatomy, for which pt was determined to be  candidate for CABG via sternotomy with likely three grafts. [place] Josephine placed 12/1 and then removed 12/3.     NM PET CT Scan myocardial perfusion scan was done which showed small perfusion defect in apical inferior wall, and Ventriculomegaly with global hypokinesis and ejection fraction of  38%. Due to small perfusion defect no FDG PET viability study was performed. CT scan of chest also done for pre-op eval.    The pt was then discharged and transferred to [facility] for further eval for revascularization and or/surgery.         Pertinent Physical Exam At Time of Discharge  Physical Exam  Vitals and nursing note reviewed.   Constitutional:       General: He is awake. He is not in acute distress.     Appearance: He is well-developed. He is obese. He is not toxic-appearing.      Comments: R femoral IABP site without evidence of hematoma, drainage, or erythema   HENT:      Head: Normocephalic and atraumatic.      Nose: Nose normal.      Mouth/Throat:      Mouth: Mucous membranes are moist.   Cardiovascular:      Rate and Rhythm: Normal rate and regular rhythm.      Pulses:           Radial pulses are 2+ on the right side and 2+ on the left side.      Comments: IABP pumping sounds heard on auscultation  Pulmonary:      Effort: Pulmonary effort is normal. No accessory muscle usage or respiratory distress.      Breath sounds: Normal breath sounds and air entry.      Comments: On RA  Abdominal:      Palpations: Abdomen is soft.      Tenderness: There is no abdominal tenderness.   Musculoskeletal:      Right lower leg: No edema.      Left lower leg: No edema.   Neurological:      Mental Status: He is alert.   Psychiatric:         Behavior: Behavior is cooperative.         Outpatient Follow-Up  Future Appointments   Date Time Provider Department Center   12/18/2024 10:00 AM CLAUDIO Galloway-CNP [id number] [facility]         Sofiya Mccoy MD  PGY-1 Internal  Medicine

## 2024-12-08 LAB
ATRIAL RATE: 98 BPM
P AXIS: 61 DEGREES
P OFFSET: 160 MS
P ONSET: 123 MS
PR INTERVAL: 176 MS
Q ONSET: 211 MS
QRS COUNT: 16 BEATS
QRS DURATION: 106 MS
QT INTERVAL: 362 MS
QTC CALCULATION(BAZETT): 462 MS
QTC FREDERICIA: 426 MS
R AXIS: 47 DEGREES
T AXIS: 97 DEGREES
T OFFSET: 392 MS
VENTRICULAR RATE: 98 BPM

## 2024-12-12 LAB
ATRIAL RATE: 111 BPM
ATRIAL RATE: 93 BPM
P AXIS: 57 DEGREES
P AXIS: 68 DEGREES
P OFFSET: 160 MS
P OFFSET: 167 MS
P ONSET: 112 MS
P ONSET: 119 MS
PR INTERVAL: 182 MS
PR INTERVAL: 196 MS
Q ONSET: 210 MS
Q ONSET: 210 MS
QRS COUNT: 15 BEATS
QRS COUNT: 18 BEATS
QRS DURATION: 106 MS
QRS DURATION: 110 MS
QT INTERVAL: 348 MS
QT INTERVAL: 374 MS
QTC CALCULATION(BAZETT): 467 MS
QTC CALCULATION(BAZETT): 473 MS
QTC FREDERICIA: 427 MS
QTC FREDERICIA: 434 MS
R AXIS: 39 DEGREES
R AXIS: 56 DEGREES
T AXIS: 101 DEGREES
T AXIS: 91 DEGREES
T OFFSET: 384 MS
T OFFSET: 397 MS
VENTRICULAR RATE: 111 BPM
VENTRICULAR RATE: 94 BPM

## 2024-12-13 NOTE — DOCUMENTATION CLARIFICATION NOTE
"    PATIENT:               YEVGENIY LEUNG  ACCT #:                  [id number]  MRN:                       [id number]  :                       1955  ADMIT DATE:       2024 6:42 PM  DISCH DATE:        2024 4:27 PM  RESPONDING PROVIDER #:        [id number]          PROVIDER RESPONSE TEXT:    Cardiogenic shock ruled in for this admission    CDI QUERY TEXT:    Clarification    Instruction:    Based on your assessment of the patient and the clinical information, please provide the requested documentation by clicking on the appropriate radio button and enter any additional information if prompted.    Question: Please further clarify the diagnosis of cardiogenic shock as    When answering this query, please exercise your independent professional judgment. The fact that a question is being asked, does not imply that any particular answer is desired or expected.    The patient's clinical indicators include:  Clinical Information: 70 y/o with PMH of CAD s/p stent 20 years ago transferred to [facility] CICU for NSTEMI.    Clinical Indicators: Cardiovascular Catheterization Report  revealed \"...Study: FVZB-Owefw-tkrswy Balloon Pump Insertion...Conclusions: 1. CAD. 3VD. NSTEMI. Cardiogenic shock 2. Successful placement of 50 cc IABP 3. No Complications during the procedure\"    Discharge Summary  revealed \"Admitted to the OSH 2024 with complaint of mid sternal chest pain not relieved by nitroglycerine. TTE showed LVEF of 32% and abnormal left ventricular wall motion. L heart cath on , found severe, focal stenosis of the mid and distal LAD, mid RCA, and  proximal left Cx with left to left collaterals. He was transferred to the CICU for further evaluation and management.  Started on nitroglycerin gtt later weaned, and CTS consulted for CABG evaluation. On  PM, Patient had recurrent chest pain. Nitroglycerin gtt restarted and uptitrated without significant improvement. IABP placed " "overnight.\"    Treatment: IABP    Risk Factors: NSTEMI  Options provided:  -- Cardiogenic shock ruled out after workup  -- Cardiogenic shock ruled in for this admission  -- Other - I will add my own diagnosis  -- Refer to Clinical Documentation Reviewer    Query created by: Jose Estrada Jr on 12/11/2024 8:41 AM      Electronically signed by:  ASHLEY VERA MD 12/13/2024 3:23 PM          "

## 2024-12-16 ENCOUNTER — TELEPHONE (OUTPATIENT)
Dept: PRIMARY CARE | Facility: CLINIC | Age: 69
End: 2024-12-16

## 2024-12-16 LAB
ATRIAL RATE: 72 BPM
P AXIS: 25 DEGREES
P OFFSET: 151 MS
P ONSET: 97 MS
PR INTERVAL: 230 MS
Q ONSET: 212 MS
QRS COUNT: 12 BEATS
QRS DURATION: 116 MS
QT INTERVAL: 376 MS
QTC CALCULATION(BAZETT): 411 MS
QTC FREDERICIA: 399 MS
R AXIS: 28 DEGREES
T AXIS: 91 DEGREES
T OFFSET: 400 MS
VENTRICULAR RATE: 72 BPM

## 2024-12-17 ENCOUNTER — TELEPHONE (OUTPATIENT)
Dept: PRIMARY CARE | Facility: CLINIC | Age: 69
End: 2024-12-17

## 2024-12-17 NOTE — TELEPHONE ENCOUNTER
Being discharged from [facility] today, 12/17 with a referral for skilled home care services. Need a verbal from Dr. Ramirez stating he will follow patient for care. [phone]

## 2024-12-17 NOTE — TELEPHONE ENCOUNTER
Brii given a verbal that Dr Ramirez will follow in skilled nursing following DC [facility], pt does need DM appt within 30 days per Dr Ramirez

## 2024-12-18 ENCOUNTER — APPOINTMENT (OUTPATIENT)
Dept: CARDIOLOGY | Facility: CLINIC | Age: 69
End: 2024-12-18

## 2024-12-21 LAB
ATRIAL RATE: 82 BPM
ATRIAL RATE: 84 BPM
P AXIS: 123 DEGREES
P AXIS: 60 DEGREES
P OFFSET: 157 MS
P ONSET: 106 MS
PR INTERVAL: 214 MS
PR INTERVAL: 216 MS
Q ONSET: 213 MS
Q ONSET: 214 MS
QRS COUNT: 14 BEATS
QRS COUNT: 14 BEATS
QRS DURATION: 108 MS
QRS DURATION: 116 MS
QT INTERVAL: 382 MS
QT INTERVAL: 408 MS
QTC CALCULATION(BAZETT): 451 MS
QTC CALCULATION(BAZETT): 476 MS
QTC FREDERICIA: 427 MS
QTC FREDERICIA: 452 MS
R AXIS: 176 DEGREES
R AXIS: 27 DEGREES
T AXIS: 115 DEGREES
T AXIS: 70 DEGREES
T OFFSET: 404 MS
T OFFSET: 418 MS
VENTRICULAR RATE: 82 BPM
VENTRICULAR RATE: 84 BPM

## 2024-12-31 LAB
ATRIAL RATE: 86 BPM
P AXIS: 65 DEGREES
P OFFSET: 160 MS
P ONSET: 126 MS
PR INTERVAL: 174 MS
Q ONSET: 213 MS
QRS COUNT: 14 BEATS
QRS DURATION: 108 MS
QT INTERVAL: 386 MS
QTC CALCULATION(BAZETT): 461 MS
QTC FREDERICIA: 435 MS
R AXIS: 13 DEGREES
T AXIS: 124 DEGREES
T OFFSET: 406 MS
VENTRICULAR RATE: 86 BPM

## 2025-01-13 ENCOUNTER — TELEPHONE (OUTPATIENT)
Dept: PRIMARY CARE | Facility: CLINIC | Age: 70
End: 2025-01-13

## 2025-01-13 DIAGNOSIS — E11.42 TYPE 2 DIABETES MELLITUS WITH DIABETIC POLYNEUROPATHY, WITHOUT LONG-TERM CURRENT USE OF INSULIN: ICD-10-CM

## 2025-01-13 NOTE — TELEPHONE ENCOUNTER
Medication Refill Request:     Patient's home care nurse called this in- However it looks like this medication had been discontinued in Oct. Please advise if p[patient should still be taking this     Medication: Metformin  mg 24 hr tablet  2 po 2 x per day     Qty: 120      LOV: 6/24/2024    NOV:      Pharmacy: [facility] [place]

## 2025-01-14 RX ORDER — METFORMIN HYDROCHLORIDE 500 MG/1
1000 TABLET, EXTENDED RELEASE ORAL
Qty: 360 TABLET | Refills: 1 | OUTPATIENT
Start: 2025-01-14 | End: 2025-07-13

## 2025-01-23 RX ORDER — TRAZODONE HYDROCHLORIDE 50 MG/1
TABLET ORAL
COMMUNITY
Start: 2024-12-17

## 2025-01-27 ENCOUNTER — APPOINTMENT (OUTPATIENT)
Dept: PRIMARY CARE | Facility: CLINIC | Age: 70
End: 2025-01-27

## 2026-01-19 ENCOUNTER — APPOINTMENT (OUTPATIENT)
Dept: DERMATOLOGY | Facility: CLINIC | Age: 71
End: 2026-01-19

## (undated) DEVICE — CATHETER, OPTITORQUE, 5FR, TIG1, 1H/100CM

## (undated) DEVICE — MANIFOLD KIT, CUSTOM, GEAUGA

## (undated) DEVICE — GUIDEWIRE, INQUIRE, J TIP, .035 X 210CM, FIXED CORE, DIAGNOSTIC

## (undated) DEVICE — SHEATH, GLIDESHEATH, SLENDER, 6FR 16CM

## (undated) DEVICE — GUIDEWIRE, AMPLATZ SUPER STIFF, STR, .035 IN X 75CM

## (undated) DEVICE — CATHETER, INTRA-AORTIC BALLOON, S PLUS, 8 FR 50CC FIBER -OPTIC

## (undated) DEVICE — ACCESS KIT, S-MAK MINI, 4FR 10CM 0.018IN 40CM, NT/PT, ECHO ENHANCE NEEDLE

## (undated) DEVICE — CATHETER, ANGIO, IMPULSE, FR4, 5 FR X 100 CM

## (undated) DEVICE — CATHETER, INFINITI DIAGNOSTIC, 5 FR 100CM 3DRC, WILLIAMS RIGHT OR NO TORQUE

## (undated) DEVICE — Device

## (undated) DEVICE — GUIDEWIRE, INQWIRE, 3MM J, .035, 150

## (undated) DEVICE — BAND, VASCULAR, RADIAL HEMOSTAT, REGULAR 24CM

## (undated) DEVICE — INTRODUCER, SHEATH, PINNACLE, 7 FR, 10 CM